# Patient Record
Sex: FEMALE | Race: WHITE | Employment: OTHER | RURAL
[De-identification: names, ages, dates, MRNs, and addresses within clinical notes are randomized per-mention and may not be internally consistent; named-entity substitution may affect disease eponyms.]

---

## 2017-01-17 DIAGNOSIS — F41.8 DEPRESSION WITH ANXIETY: ICD-10-CM

## 2017-01-17 DIAGNOSIS — Z76.0 ENCOUNTER FOR MEDICATION REFILL: ICD-10-CM

## 2017-01-18 RX ORDER — LORAZEPAM 1 MG/1
1 TABLET ORAL
Qty: 60 TAB | Refills: 0 | Status: SHIPPED | OUTPATIENT
Start: 2017-01-18 | End: 2017-04-11 | Stop reason: SDUPTHER

## 2017-02-24 DIAGNOSIS — Z76.0 ENCOUNTER FOR MEDICATION REFILL: ICD-10-CM

## 2017-02-24 DIAGNOSIS — F41.8 DEPRESSION WITH ANXIETY: ICD-10-CM

## 2017-02-24 RX ORDER — LORAZEPAM 1 MG/1
1 TABLET ORAL
Qty: 60 TAB | Refills: 0 | Status: SHIPPED | OUTPATIENT
Start: 2017-02-24 | End: 2017-05-30 | Stop reason: SDUPTHER

## 2017-03-28 DIAGNOSIS — I25.83 CORONARY ARTERY DISEASE DUE TO LIPID RICH PLAQUE: ICD-10-CM

## 2017-03-28 DIAGNOSIS — I25.10 CORONARY ARTERY DISEASE DUE TO LIPID RICH PLAQUE: ICD-10-CM

## 2017-03-30 RX ORDER — CLOPIDOGREL BISULFATE 75 MG/1
TABLET ORAL
Qty: 90 TAB | Refills: 0 | Status: SHIPPED | OUTPATIENT
Start: 2017-03-30 | End: 2017-04-18 | Stop reason: SDUPTHER

## 2017-04-11 DIAGNOSIS — Z76.0 ENCOUNTER FOR MEDICATION REFILL: ICD-10-CM

## 2017-04-11 DIAGNOSIS — F41.8 DEPRESSION WITH ANXIETY: ICD-10-CM

## 2017-04-13 RX ORDER — LORAZEPAM 1 MG/1
1 TABLET ORAL
Qty: 60 TAB | Refills: 0 | Status: SHIPPED | OUTPATIENT
Start: 2017-04-13 | End: 2017-08-22 | Stop reason: SDUPTHER

## 2017-04-18 ENCOUNTER — OFFICE VISIT (OUTPATIENT)
Dept: FAMILY MEDICINE CLINIC | Age: 67
End: 2017-04-18

## 2017-04-18 VITALS
OXYGEN SATURATION: 96 % | WEIGHT: 167 LBS | HEIGHT: 62 IN | DIASTOLIC BLOOD PRESSURE: 76 MMHG | BODY MASS INDEX: 30.73 KG/M2 | SYSTOLIC BLOOD PRESSURE: 162 MMHG | HEART RATE: 64 BPM | RESPIRATION RATE: 20 BRPM | TEMPERATURE: 98.1 F

## 2017-04-18 DIAGNOSIS — F41.8 DEPRESSION WITH ANXIETY: ICD-10-CM

## 2017-04-18 DIAGNOSIS — E78.5 HYPERLIPIDEMIA, UNSPECIFIED HYPERLIPIDEMIA TYPE: ICD-10-CM

## 2017-04-18 DIAGNOSIS — K21.9 GASTROESOPHAGEAL REFLUX DISEASE WITHOUT ESOPHAGITIS: ICD-10-CM

## 2017-04-18 DIAGNOSIS — M25.561 CHRONIC PAIN OF RIGHT KNEE: ICD-10-CM

## 2017-04-18 DIAGNOSIS — H54.7 BLINDNESS: ICD-10-CM

## 2017-04-18 DIAGNOSIS — I10 ESSENTIAL HYPERTENSION: Primary | ICD-10-CM

## 2017-04-18 DIAGNOSIS — E53.8 B12 DEFICIENCY: ICD-10-CM

## 2017-04-18 DIAGNOSIS — I25.10 CORONARY ARTERY DISEASE DUE TO LIPID RICH PLAQUE: ICD-10-CM

## 2017-04-18 DIAGNOSIS — Z76.0 ENCOUNTER FOR MEDICATION REFILL: ICD-10-CM

## 2017-04-18 DIAGNOSIS — I25.83 CORONARY ARTERY DISEASE DUE TO LIPID RICH PLAQUE: ICD-10-CM

## 2017-04-18 DIAGNOSIS — G89.29 CHRONIC PAIN OF RIGHT KNEE: ICD-10-CM

## 2017-04-18 RX ORDER — TRAZODONE HYDROCHLORIDE 50 MG/1
50 TABLET ORAL
Qty: 30 TAB | Refills: 5 | Status: SHIPPED | OUTPATIENT
Start: 2017-04-18 | End: 2017-10-20 | Stop reason: SDUPTHER

## 2017-04-18 RX ORDER — CLOPIDOGREL BISULFATE 75 MG/1
75 TABLET ORAL DAILY
Qty: 90 TAB | Refills: 1 | Status: SHIPPED | OUTPATIENT
Start: 2017-04-18 | End: 2017-09-29 | Stop reason: SDUPTHER

## 2017-04-18 RX ORDER — POTASSIUM CHLORIDE 750 MG/1
CAPSULE, EXTENDED RELEASE ORAL
Qty: 180 CAP | Refills: 1 | Status: SHIPPED | OUTPATIENT
Start: 2017-04-18 | End: 2018-06-08 | Stop reason: SDUPTHER

## 2017-04-18 RX ORDER — CETIRIZINE HCL 10 MG
10 TABLET ORAL DAILY
Qty: 30 TAB | Refills: 11 | Status: SHIPPED | OUTPATIENT
Start: 2017-04-18 | End: 2019-08-13

## 2017-04-18 RX ORDER — RANITIDINE 150 MG/1
150 TABLET, FILM COATED ORAL
Qty: 90 TAB | Refills: 2 | Status: SHIPPED | OUTPATIENT
Start: 2017-04-18 | End: 2017-09-29 | Stop reason: SDUPTHER

## 2017-04-18 RX ORDER — FUROSEMIDE 20 MG/1
20 TABLET ORAL DAILY
Qty: 90 TAB | Refills: 1 | Status: SHIPPED | OUTPATIENT
Start: 2017-04-18 | End: 2018-08-24 | Stop reason: SDUPTHER

## 2017-04-18 RX ORDER — LORAZEPAM 1 MG/1
1 TABLET ORAL
Qty: 60 TAB | Refills: 0 | Status: CANCELLED | OUTPATIENT
Start: 2017-04-18

## 2017-04-18 RX ORDER — BUPROPION HYDROCHLORIDE 150 MG/1
150 TABLET ORAL
Qty: 90 TAB | Refills: 1 | Status: SHIPPED | OUTPATIENT
Start: 2017-04-18 | End: 2018-03-19 | Stop reason: SDUPTHER

## 2017-04-18 RX ORDER — FOLIC ACID 1 MG/1
1 TABLET ORAL DAILY
Qty: 90 TAB | Refills: 1 | Status: SHIPPED | OUTPATIENT
Start: 2017-04-18 | End: 2017-07-17 | Stop reason: SDUPTHER

## 2017-04-18 RX ORDER — ATENOLOL 50 MG/1
TABLET ORAL
Qty: 90 TAB | Refills: 1 | Status: SHIPPED | OUTPATIENT
Start: 2017-04-18 | End: 2017-11-09 | Stop reason: SDUPTHER

## 2017-04-18 RX ORDER — SIMVASTATIN 40 MG/1
TABLET, FILM COATED ORAL
Qty: 90 TAB | Refills: 1 | Status: SHIPPED | OUTPATIENT
Start: 2017-04-18 | End: 2017-09-11 | Stop reason: SDUPTHER

## 2017-04-18 RX ORDER — VENLAFAXINE HYDROCHLORIDE 37.5 MG/1
37.5 CAPSULE, EXTENDED RELEASE ORAL DAILY
Qty: 90 CAP | Refills: 1 | Status: SHIPPED | OUTPATIENT
Start: 2017-04-18 | End: 2018-08-30 | Stop reason: SDUPTHER

## 2017-04-18 RX ORDER — CALCITRIOL 0.25 UG/1
0.25 CAPSULE ORAL DAILY
Qty: 90 CAP | Refills: 1 | Status: SHIPPED | OUTPATIENT
Start: 2017-04-18 | End: 2020-06-09

## 2017-04-18 NOTE — PROGRESS NOTES
Reviewed record in preparation for visit and have necessary documentation  Pt did not bring medication to office visit for review  Opportunity was given for questions  Goals that were addressed and/or need to be completed after this appointment include   Health Maintenance Due   Topic Date Due    DTaP/Tdap/Td series (1 - Tdap) 04/22/1973    PAP AKA CERVICAL CYTOLOGY  04/22/1973    ZOSTER VACCINE AGE 60>  04/22/2012    FOBT Q 1 YEAR AGE 50-75  02/25/2016    INFLUENZA AGE 9 TO ADULT  08/01/2016

## 2017-04-18 NOTE — MR AVS SNAPSHOT
Visit Information Date & Time Provider Department Dept. Phone Encounter #  
 4/18/2017  2:20 PM Emma Kelly MD [address] [place] [address] [id number] Follow-up Instructions Return in about 6 months (around 10/18/2017), or if symptoms worsen or fail to improve. Upcoming Health Maintenance Date Due DTaP/Tdap/Td series (1 - Tdap) 4/22/1973 PAP AKA CERVICAL CYTOLOGY 4/22/1973 ZOSTER VACCINE AGE 60> 4/22/2012 FOBT Q 1 YEAR AGE 50-75 2/25/2016 INFLUENZA AGE 9 TO ADULT 8/1/2016 BREAST CANCER SCRN MAMMOGRAM 5/20/2018 Allergies as of 4/18/2017  Review Complete On: 4/18/2017 By: Emma Kelly MD  
  
 Severity Noted Reaction Type Reactions Percocet [Oxycodone-acetaminophen]  03/29/2012    Itching Current Immunizations  Reviewed on 10/25/2012 Name Date Influenza Vaccine 10/16/2013 Influenza Vaccine Split 10/4/2012 Pneumococcal Polysaccharide (PPSV-23) 10/18/2016 Not reviewed this visit You Were Diagnosed With   
  
 Codes Comments Essential hypertension    -  Primary ICD-10-CM: I10 
ICD-9-CM: 401.9 Hyperlipidemia, unspecified hyperlipidemia type     ICD-10-CM: E78.5 ICD-9-CM: 272.4 Coronary artery disease due to lipid rich plaque     ICD-10-CM: I25.10, I25.83 ICD-9-CM: 414.00, 414.3 Gastroesophageal reflux disease without esophagitis     ICD-10-CM: K21.9 ICD-9-CM: 530.81   
 B12 deficiency     ICD-10-CM: E53.8 ICD-9-CM: 266.2 Depression with anxiety     ICD-10-CM: F41.8 ICD-9-CM: 300.4 Chronic pain of right knee     ICD-10-CM: M25.561, G89.29 ICD-9-CM: 719.46, 338.29 Encounter for medication refill     ICD-10-CM: Z76.0 ICD-9-CM: V68.1 Blindness     ICD-10-CM: H54.0 ICD-9-CM: 369.00 Vitals BP Pulse Temp Resp Height(growth percentile) Weight(growth percentile) 162/76 64 98.1 °F (36.7 °C) (Oral) 20 5' 2\" (1.575 m) 167 lb (75.8 kg) SpO2 BMI OB Status Smoking Status 96% 30.54 kg/m2 Postmenopausal Never Smoker Vitals History BMI and BSA Data Body Mass Index Body Surface Area 30.54 kg/m 2 1.82 m 2 Preferred Pharmacy Pharmacy Name Phone [address] [facility]  [phone] Your Updated Medication List  
  
   
This list is accurate as of: 4/18/17  3:44 PM.  Always use your most recent med list.  
  
  
  
  
 * albuterol 90 mcg/actuation inhaler Commonly known as:  VENTOLIN HFA Take 1 Puff by inhalation every six (6) hours as needed for Wheezing. * albuterol 90 mcg/actuation inhaler Commonly known as:  PROVENTIL HFA, VENTOLIN HFA, PROAIR HFA Take 2 Puffs by inhalation every four (4) hours as needed for Wheezing. aspirin 81 mg tablet Take 81 mg by mouth. atenolol 50 mg tablet Commonly known as:  TENORMIN  
TAKE ONE TABLET BY MOUTH DAILY  
  
 buPROPion  mg tablet Commonly known as:  Charu Drum Take 1 Tab by mouth every morning. calcitRIOL 0.25 mcg capsule Commonly known as:  ROCALTROL Take 1 Cap by mouth daily. cetirizine 10 mg tablet Commonly known as:  ZYRTEC Take 1 Tab by mouth daily. clopidogrel 75 mg Tab Commonly known as:  PLAVIX Take 1 Tab by mouth daily. * cyanocobalamin 500 mcg/spray Spry  
1,000 mcg by Nasal route every seven (7) days. * cyanocobalamin 1,000 mcg/mL injection Commonly known as:  VITAMIN B12  
1 mL by IntraMUSCular route every seven (7) days. folic acid 1 mg tablet Commonly known as:  [employer] Take 1 Tab by mouth daily. furosemide 20 mg tablet Commonly known as:  LASIX Take 1 Tab by mouth daily. * LORazepam 1 mg tablet Commonly known as:  ATIVAN Take 1 Tab by mouth every eight (8) hours as needed for Anxiety. * LORazepam 1 mg tablet Commonly known as:  ATIVAN  
 Take 1 Tab by mouth every eight (8) hours as needed for Anxiety. nitroglycerin 0.4 mg SL tablet Commonly known as:  NITROSTAT  
1 Tab by SubLINGual route as needed for Chest Pain.  
  
 potassium chloride SA 10 mEq capsule Commonly known as:  MICRO-K  
TAKE ONE CAPSULE BY MOUTH TWICE A DAY  
  
 raNITIdine 150 mg tablet Commonly known as:  ZANTAC Take 1 Tab by mouth nightly. simvastatin 40 mg tablet Commonly known as:  ZOCOR  
TAKE ONE TABLET BY MOUTH NIGHTLY  
  
 traZODone 50 mg tablet Commonly known as:  [place] Take 1 Tab by mouth nightly. Indications: insomnia associated with depression  
  
 triamcinolone acetonide 0.1 % ointment Commonly known as:  KENALOG Apply  to affected area two (2) times a day. use thin layer  
  
 venlafaxine-SR 37.5 mg capsule Commonly known as:  EFFEXOR-XR Take 1 Cap by mouth daily. * Notice: This list has 6 medication(s) that are the same as other medications prescribed for you. Read the directions carefully, and ask your doctor or other care provider to review them with you. Prescriptions Sent to Pharmacy Refills  
 clopidogrel (PLAVIX) 75 mg tab 1 Sig: Take 1 Tab by mouth daily. Class: Normal  
 Pharmacy: [address] [place] [address] Ph #: [phone] Route: Oral  
 simvastatin (ZOCOR) 40 mg tablet 1 Sig: TAKE ONE TABLET BY MOUTH NIGHTLY Class: Normal  
 Pharmacy: [address] [place] [address] Ph #: [phone]  
 atenolol (TENORMIN) 50 mg tablet 1 Sig: TAKE ONE TABLET BY MOUTH DAILY Class: Normal  
 Pharmacy: [address] [place] [address] Ph #: [phone]  
 buPROPion XL (WELLBUTRIN XL) 150 mg tablet 1 Sig: Take 1 Tab by mouth every morning. Class: Normal  
 Pharmacy: [address] [place] [address] Ph #: [phone]  Route: Oral  
 raNITIdine (ZANTAC) 150 mg tablet 2  
 Sig: Take 1 Tab by mouth nightly. Class: Normal  
 Pharmacy: [address] [place] [address] #: [phone] Route: Oral  
 cetirizine (ZYRTEC) 10 mg tablet 11 Sig: Take 1 Tab by mouth daily. Class: Normal  
 Pharmacy: [address] [place] [address] #: [phone] Route: Oral  
 venlafaxine-SR (EFFEXOR-XR) 37.5 mg capsule 1 Sig: Take 1 Cap by mouth daily. Class: Normal  
 Pharmacy: [address] [place] [address] #: [phone] Route: Oral  
 furosemide (LASIX) 20 mg tablet 1 Sig: Take 1 Tab by mouth daily. Class: Normal  
 Pharmacy: [address] [place] [address] #: [phone] Route: Oral  
 potassium chloride SA (MICRO-K) 10 mEq capsule 1 Sig: TAKE ONE CAPSULE BY MOUTH TWICE A DAY Class: Normal  
 Pharmacy: [address] [place] [address] #: [phone]  
 calcitRIOL (ROCALTROL) 0.25 mcg capsule 1 Sig: Take 1 Cap by mouth daily. Class: Normal  
 Pharmacy: [address] [place] [address] #: [phone] Route: Oral  
 folic acid (FOLVITE) 1 mg tablet 1 Sig: Take 1 Tab by mouth daily. Class: Normal  
 Pharmacy: [address] [place] [address] #: [phone] Route: Oral  
 traZODone (DESYREL) 50 mg tablet 5 Sig: Take 1 Tab by mouth nightly. Indications: insomnia associated with depression Class: Normal  
 Pharmacy: [address] [place] [address] #: [phone] Route: Oral  
  
We Performed the Following CBC W/O DIFF [51869 CPT(R)] LIPID PANEL [12184 CPT(R)] METABOLIC PANEL, COMPREHENSIVE [51025 CPT(R)] REFERRAL TO ORTHOPEDICS [[id number] Custom] Comments:  
 Please evaluate patient for chronic right knee pain and edema. Chantel. Call patient. TSH 3RD GENERATION [97140 CPT(R)] VITAMIN B12 [id number] CPT(R)] Follow-up Instructions Return in about 6 months (around 10/18/2017), or if symptoms worsen or fail to improve. To-Do List   
 04/18/2017 Imaging:  XR KNEE RT MAX 2 VWS Referral Information Referral ID Referred By Referred To  
  
 [id number] Net[facility]EL[facility] Not Available Visits Status Start Date End Date 1 New Request 4/18/17 4/18/18 If your referral has a status of pending review or denied, additional information will be sent to support the outcome of this decision. Patient Instructions Advance Directives: Care Instructions Your Care Instructions An advance directive is a legal way to state your wishes at the end of your life. It tells your family and your doctor what to do if you can no longer say what you want. There are two main types of advance directives. You can change them any time that your wishes change. · A living will tells your family and your doctor your wishes about life support and other treatment. · A durable power of  for health care lets you name a person to make treatment decisions for you when you can't speak for yourself. This person is called a health care agent. If you do not have an advance directive, decisions about your medical care may be made by a doctor or a  who doesn't know you. It may help to think of an advance directive as a gift to the people who care for you. If you have one, they won't have to make tough decisions by themselves. Follow-up care is a key part of your treatment and safety. Be sure to make and go to all appointments, and call your doctor if you are having problems. It's also a good idea to know your test results and keep a list of the medicines you take. How can you care for yourself at home? · Discuss your wishes with your loved ones and your doctor. This way, there are no surprises. · Many states have a unique form.  Or you might use a universal form that has been approved by many states. This kind of form can sometimes be completed and stored online. Your electronic copy will then be available wherever you have a connection to the Internet. In most cases, doctors will respect your wishes even if you have a form from a different state. · You don't need a  to do an advance directive. But you may want to get legal advice. · Think about these questions when you prepare an advance directive: ¨ Who do you want to make decisions about your medical care if you are not able to? Many people choose a family member or close friend. ¨ Do you know enough about life support methods that might be used? If not, talk to your doctor so you understand. ¨ What are you most afraid of that might happen? You might be afraid of having pain, losing your independence, or being kept alive by machines. ¨ Where would you prefer to die? Choices include your home, a hospital, or a nursing home. ¨ Would you like to have information about hospice care to support you and your family? ¨ Do you want to donate organs when you die? ¨ Do you want certain Voodoo practices performed before you die? If so, put your wishes in the advance directive. · Read your advance directive every year, and make changes as needed. When should you call for help? Be sure to contact your doctor if you have any questions. Where can you learn more? Go to http://whitney-gage.info/. Enter R264 in the search box to learn more about \"Advance Directives: Care Instructions. \" Current as of: November 17, 2016 Content Version: 11.2 © 1816-1038 [employer]. Care instructions adapted under license by [employer] (which disclaims liability or warranty for this information).  If you have questions about a medical condition or this instruction, always ask your healthcare professional. [address] any warranty or liability for your use of this information. Please provide this summary of care documentation to your next provider. Your primary care clinician is listed as Esthela Sierra. If you have any questions after today's visit, please call [phone].

## 2017-04-18 NOTE — PATIENT INSTRUCTIONS
Advance Directives: Care Instructions  Your Care Instructions  An advance directive is a legal way to state your wishes at the end of your life. It tells your family and your doctor what to do if you can no longer say what you want. There are two main types of advance directives. You can change them any time that your wishes change. · A living will tells your family and your doctor your wishes about life support and other treatment. · A durable power of  for health care lets you name a person to make treatment decisions for you when you can't speak for yourself. This person is called a health care agent. If you do not have an advance directive, decisions about your medical care may be made by a doctor or a  who doesn't know you. It may help to think of an advance directive as a gift to the people who care for you. If you have one, they won't have to make tough decisions by themselves. Follow-up care is a key part of your treatment and safety. Be sure to make and go to all appointments, and call your doctor if you are having problems. It's also a good idea to know your test results and keep a list of the medicines you take. How can you care for yourself at home? · Discuss your wishes with your loved ones and your doctor. This way, there are no surprises. · Many states have a unique form. Or you might use a universal form that has been approved by many states. This kind of form can sometimes be completed and stored online. Your electronic copy will then be available wherever you have a connection to the Internet. In most cases, doctors will respect your wishes even if you have a form from a different state. · You don't need a  to do an advance directive. But you may want to get legal advice. · Think about these questions when you prepare an advance directive:  ¨ Who do you want to make decisions about your medical care if you are not able to?  Many people choose a family member or close friend. ¨ Do you know enough about life support methods that might be used? If not, talk to your doctor so you understand. ¨ What are you most afraid of that might happen? You might be afraid of having pain, losing your independence, or being kept alive by machines. ¨ Where would you prefer to die? Choices include your home, a hospital, or a nursing home. ¨ Would you like to have information about hospice care to support you and your family? ¨ Do you want to donate organs when you die? ¨ Do you want certain Bahai practices performed before you die? If so, put your wishes in the advance directive. · Read your advance directive every year, and make changes as needed. When should you call for help? Be sure to contact your doctor if you have any questions. Where can you learn more? Go to http://whitney-gage.info/. Enter R264 in the search box to learn more about \"Advance Directives: Care Instructions. \"  Current as of: November 17, 2016  Content Version: 11.2  © 6753-9767 [employer]wise, Incorporated. Care instructions adapted under license by [employer] (which disclaims liability or warranty for this information). If you have questions about a medical condition or this instruction, always ask your healthcare professional. [address] any warranty or liability for your use of this information.

## 2017-04-19 LAB
ALBUMIN SERPL-MCNC: 4.1 G/DL (ref 3.6–4.8)
ALBUMIN/GLOB SERPL: 1.6 {RATIO} (ref 1.2–2.2)
ALP SERPL-CCNC: 63 IU/L (ref 39–117)
ALT SERPL-CCNC: 12 IU/L (ref 0–32)
AST SERPL-CCNC: 15 IU/L (ref 0–40)
BILIRUB SERPL-MCNC: 0.3 MG/DL (ref 0–1.2)
BUN SERPL-MCNC: 11 MG/DL (ref 8–27)
BUN/CREAT SERPL: 14 (ref 12–28)
CALCIUM SERPL-MCNC: 9.5 MG/DL (ref 8.7–10.3)
CHLORIDE SERPL-SCNC: 109 MMOL/L (ref 96–106)
CHOLEST SERPL-MCNC: 162 MG/DL (ref 100–199)
CO2 SERPL-SCNC: 24 MMOL/L (ref 18–29)
CREAT SERPL-MCNC: 0.78 MG/DL (ref 0.57–1)
ERYTHROCYTE [DISTWIDTH] IN BLOOD BY AUTOMATED COUNT: 13.1 % (ref 12.3–15.4)
GLOBULIN SER CALC-MCNC: 2.6 G/DL (ref 1.5–4.5)
GLUCOSE SERPL-MCNC: 87 MG/DL (ref 65–99)
HCT VFR BLD AUTO: 37.7 % (ref 34–46.6)
HDLC SERPL-MCNC: 54 MG/DL
HGB BLD-MCNC: 12.7 G/DL (ref 11.1–15.9)
LDLC SERPL CALC-MCNC: 91 MG/DL (ref 0–99)
MCH RBC QN AUTO: 32.6 PG (ref 26.6–33)
MCHC RBC AUTO-ENTMCNC: 33.7 G/DL (ref 31.5–35.7)
MCV RBC AUTO: 97 FL (ref 79–97)
PLATELET # BLD AUTO: 169 X10E3/UL (ref 150–379)
POTASSIUM SERPL-SCNC: 4.4 MMOL/L (ref 3.5–5.2)
PROT SERPL-MCNC: 6.7 G/DL (ref 6–8.5)
RBC # BLD AUTO: 3.89 X10E6/UL (ref 3.77–5.28)
SODIUM SERPL-SCNC: 147 MMOL/L (ref 134–144)
TRIGL SERPL-MCNC: 85 MG/DL (ref 0–149)
TSH SERPL DL<=0.005 MIU/L-ACNC: 2.78 UIU/ML (ref 0.45–4.5)
VIT B12 SERPL-MCNC: 292 PG/ML (ref 211–946)
VLDLC SERPL CALC-MCNC: 17 MG/DL (ref 5–40)
WBC # BLD AUTO: 6.7 X10E3/UL (ref 3.4–10.8)

## 2017-04-25 NOTE — PROGRESS NOTES
Chief Complaint   Patient presents with    Knee Pain     swelling and pain     she is a 74y.o. year old female who presents for medication refills. Patient with multiple co-morbidities that include CAD, HTN, HLD, B12 deficiency, blindness, anxiety and depression. Patient denies HA, dizziness, SOB, CP, abdominal pain, dysuria. She complains of right knee edema and pain. Hypertension:  The patient reports: Has run out of medication, no medication side effects noted, no TIA's, no chest pain on exertion, no dyspnea on exertion, no swelling of ankles. Lifestyle modification/social history: sedentary     BP Readings from Last 3 Encounters:   04/18/17 162/76   10/12/16 119/55   09/09/16 159/77     Lab Results   Component Value Date/Time    Sodium 147 04/18/2017 04:01 PM    Potassium 4.4 04/18/2017 04:01 PM    Chloride 109 04/18/2017 04:01 PM    CO2 24 04/18/2017 04:01 PM    Glucose 87 04/18/2017 04:01 PM    BUN 11 04/18/2017 04:01 PM    Creatinine 0.78 04/18/2017 04:01 PM    BUN/Creatinine ratio 14 04/18/2017 04:01 PM    GFR est AA 93 04/18/2017 04:01 PM    GFR est non-AA 81 04/18/2017 04:01 PM    Calcium 9.5 04/18/2017 04:01 PM    Bilirubin, total 0.3 04/18/2017 04:01 PM    ALT (SGPT) 12 04/18/2017 04:01 PM    AST (SGOT) 15 04/18/2017 04:01 PM    Alk. phosphatase 63 04/18/2017 04:01 PM    Protein, total 6.7 04/18/2017 04:01 PM    Albumin 4.1 04/18/2017 04:01 PM    A-G Ratio 1.6 04/18/2017 04:01 PM        Patient advised to log blood pressures at home 3-5 times monthly and bring to next visit. Call office as soon as possible if BP's over 140/90 on multiple occasions or with symptoms of dizziness, chest pain, shortness of breath, headache or ankle swelling. Our goal is to normalize the blood pressure to decrease the risks of strokes and heart attacks. The patient is in agreement with the plan.     Hyperlipidemia    Cardiovascular risks for her are: HTN, CAD    Lab Results   Component Value Date/Time Cholesterol, total 162 04/18/2017 04:01 PM    HDL Cholesterol 54 04/18/2017 04:01 PM    LDL, calculated 91 04/18/2017 04:01 PM    Triglyceride 85 04/18/2017 04:01 PM     Lab Results   Component Value Date/Time    ALT (SGPT) 12 04/18/2017 04:01 PM    AST (SGOT) 15 04/18/2017 04:01 PM    Alk. phosphatase 63 04/18/2017 04:01 PM    Bilirubin, total 0.3 04/18/2017 04:01 PM     Our goal is to lower hyperlipidemia to decrease the risks of strokes and heart attacks      Patient Active Problem List   Diagnosis Code    Anxiety F41.9    Hyperlipidemia E78.5    CAD (coronary artery disease) I25.10    Blindness H54.0    HTN (hypertension) I10    Osteopenia M85.80    B12 deficiency E53.8    Insomnia G47.00    Allergic rhinitis J30.9    Gastroesophageal reflux disease without esophagitis K21.9     Past Surgical History:   Procedure Laterality Date    CABG, VEIN, FIVE  2006    CARDIAC SURG PROCEDURE UNLIST      HX ORTHOPAEDIC       Social History     Social History    Marital status:      Spouse name: N/A    Number of children: N/A    Years of education: N/A     Occupational History    Not on file. Social History Main Topics    Smoking status: Never Smoker    Smokeless tobacco: Never Used    Alcohol use Yes      Comment: social    Drug use: No    Sexual activity: Yes     Partners: Male     Birth control/ protection: None     Other Topics Concern    Not on file     Social History Narrative     Family History   Problem Relation Age of Onset    No Known Problems Mother     No Known Problems Father      Current Outpatient Prescriptions   Medication Sig    clopidogrel (PLAVIX) 75 mg tab Take 1 Tab by mouth daily.  simvastatin (ZOCOR) 40 mg tablet TAKE ONE TABLET BY MOUTH NIGHTLY    atenolol (TENORMIN) 50 mg tablet TAKE ONE TABLET BY MOUTH DAILY    buPROPion XL (WELLBUTRIN XL) 150 mg tablet Take 1 Tab by mouth every morning.  raNITIdine (ZANTAC) 150 mg tablet Take 1 Tab by mouth nightly.     cetirizine (ZYRTEC) 10 mg tablet Take 1 Tab by mouth daily.  venlafaxine-SR (EFFEXOR-XR) 37.5 mg capsule Take 1 Cap by mouth daily.  furosemide (LASIX) 20 mg tablet Take 1 Tab by mouth daily.  potassium chloride SA (MICRO-K) 10 mEq capsule TAKE ONE CAPSULE BY MOUTH TWICE A DAY    calcitRIOL (ROCALTROL) 0.25 mcg capsule Take 1 Cap by mouth daily.  folic acid (FOLVITE) 1 mg tablet Take 1 Tab by mouth daily.  traZODone (DESYREL) 50 mg tablet Take 1 Tab by mouth nightly. Indications: insomnia associated with depression    LORazepam (ATIVAN) 1 mg tablet Take 1 Tab by mouth every eight (8) hours as needed for Anxiety.  LORazepam (ATIVAN) 1 mg tablet Take 1 Tab by mouth every eight (8) hours as needed for Anxiety.  albuterol (PROVENTIL HFA, VENTOLIN HFA, PROAIR HFA) 90 mcg/actuation inhaler Take 2 Puffs by inhalation every four (4) hours as needed for Wheezing.  albuterol (VENTOLIN HFA) 90 mcg/actuation inhaler Take 1 Puff by inhalation every six (6) hours as needed for Wheezing.  nitroglycerin (NITROSTAT) 0.4 mg SL tablet 1 Tab by SubLINGual route as needed for Chest Pain.  aspirin 81 mg tablet Take 81 mg by mouth.  cyanocobalamin (VITAMIN B12) 1,000 mcg/mL injection 1 mL by IntraMUSCular route every seven (7) days.  triamcinolone acetonide (KENALOG) 0.1 % ointment Apply  to affected area two (2) times a day. use thin layer    cyanocobalamin 500 mcg spry 1,000 mcg by Nasal route every seven (7) days. No current facility-administered medications for this visit.       Allergies   Allergen Reactions    Percocet [Oxycodone-Acetaminophen] Itching       Review of Systems:  Constitutional: feeling well, denies fatigue, malaise  Skin: Negative for rash or lesion  HEENT: see HPI, Negative for acute hearing changes  Respiratory: Negative for cough, wheezing or SOB  Cardiovascular: Negative for chest pain, dizziness or palpitations  Gastrointestinal: Negative for nausea or abdominal pain  Genital/urinary: Negative for dysuria or voiding dysfunction  Musculoskeletal: c/o right knee arthralgia   Neurological: Negative for HA, weakness or paresthesia  Psychlogical: Negative for depression or anxiety     Vitals:    04/18/17 1425 04/18/17 1431   BP: 177/90 162/76   Pulse: 64    Resp: 20    Temp: 98.1 °F (36.7 °C)    TempSrc: Oral    SpO2: 96%    Weight: 167 lb (75.8 kg)    Height: 5' 2\" (1.575 m)        Physical Examination:  General: Well developed, well nourished, in no acute distress  Skin: Warm and dry, no rash or lesion appreciated  Head: Normocephalic, atraumatic  Eyes: Sclera clear, EOMI  Neck: Normal range of motion  Respiratory: Clear to auscultation bilaterally with symmetrical effort  Cardiovascular: Normal S1, S2, Regular rate and rhythm  Abdomen: Soft, Normal BS, non-distended  Extremities: Full range of motion, Normal gait  Neurological: Normal strength and sensation. No focal deficits  Psychological: Active, alert and oriented. Affect appropriate     Xray: Minimal DJD patella     Chyna was seen today for knee pain. Diagnoses and all orders for this visit:    Essential hypertension  -     atenolol (TENORMIN) 50 mg tablet; TAKE ONE TABLET BY MOUTH DAILY  -     furosemide (LASIX) 20 mg tablet; Take 1 Tab by mouth daily.  -     METABOLIC PANEL, COMPREHENSIVE  -     TSH 3RD GENERATION  -     CBC W/O DIFF    Hyperlipidemia, unspecified hyperlipidemia type  -     simvastatin (ZOCOR) 40 mg tablet; TAKE ONE TABLET BY MOUTH NIGHTLY  -     METABOLIC PANEL, COMPREHENSIVE  -     LIPID PANEL    Coronary artery disease due to lipid rich plaque  -     clopidogrel (PLAVIX) 75 mg tab; Take 1 Tab by mouth daily. -     simvastatin (ZOCOR) 40 mg tablet; TAKE ONE TABLET BY MOUTH NIGHTLY  -     METABOLIC PANEL, COMPREHENSIVE  -     LIPID PANEL    Gastroesophageal reflux disease without esophagitis  -     raNITIdine (ZANTAC) 150 mg tablet; Take 1 Tab by mouth nightly.     B12 deficiency  -     VITAMIN B12    Depression with anxiety  -     buPROPion XL (WELLBUTRIN XL) 150 mg tablet; Take 1 Tab by mouth every morning.  -     venlafaxine-SR (EFFEXOR-XR) 37.5 mg capsule; Take 1 Cap by mouth daily. -     traZODone (DESYREL) 50 mg tablet; Take 1 Tab by mouth nightly. Indications: insomnia associated with depression    Chronic pain of right knee  -     XR KNEE RT MAX 2 VWS; Future  -     REFERRAL TO ORTHOPEDICS    Encounter for medication refill  -     cetirizine (ZYRTEC) 10 mg tablet; Take 1 Tab by mouth daily. -     potassium chloride SA (MICRO-K) 10 mEq capsule; TAKE ONE CAPSULE BY MOUTH TWICE A DAY  -     calcitRIOL (ROCALTROL) 0.25 mcg capsule; Take 1 Cap by mouth daily. -     folic acid (FOLVITE) 1 mg tablet; Take 1 Tab by mouth daily. Blindness    Other orders  -     Cancel: LORazepam (ATIVAN) 1 mg tablet; Take 1 Tab by mouth every eight (8) hours as needed for Anxiety. Importance of compliance with all prescribed medications discussed. Continue current prescribed medications as written. I have discussed the diagnosis with the patient and the intended plan as seen in the above orders. The patient expresses understanding and agreement with our plan of care. All of the patient's questions were answered to apparent satisfaction. The patient has received an after-visit summary. The patient knows to call our office if there are any questions or concerns regarding diagnosis and treatment plans. I have discussed medication side effects and warnings with the patient as well. Over half of the 40 minutes face to face with Veto Dotson consisted of counseling and discussing treatment plans in reference to her multiple co-morbidities. Follow-up Disposition:  Return in about 6 months (around 10/18/2017), or if symptoms worsen or fail to improve.

## 2017-05-11 ENCOUNTER — TELEPHONE (OUTPATIENT)
Dept: FAMILY MEDICINE CLINIC | Age: 67
End: 2017-05-11

## 2017-05-11 RX ORDER — FLUTICASONE PROPIONATE 50 MCG
2 SPRAY, SUSPENSION (ML) NASAL DAILY
Qty: 1 BOTTLE | Refills: 2 | Status: SHIPPED | OUTPATIENT
Start: 2017-05-11 | End: 2018-01-31 | Stop reason: SDUPTHER

## 2017-05-11 NOTE — TELEPHONE ENCOUNTER
Pt mentioned to Dr. Precilla Hodgkins about her congestion and coughing with phlem. Didn't remember that she needed prednisone or something for it until she got to the [facility]. Thanks Please confirm with Pt.

## 2017-05-30 DIAGNOSIS — F41.8 DEPRESSION WITH ANXIETY: ICD-10-CM

## 2017-05-30 DIAGNOSIS — Z76.0 ENCOUNTER FOR MEDICATION REFILL: ICD-10-CM

## 2017-06-01 RX ORDER — LORAZEPAM 1 MG/1
1 TABLET ORAL
Qty: 60 TAB | Refills: 0 | Status: SHIPPED | OUTPATIENT
Start: 2017-06-01 | End: 2017-07-12 | Stop reason: SDUPTHER

## 2017-07-11 RX ORDER — IBUPROFEN 400 MG/1
TABLET ORAL
Qty: 60 TAB | Refills: 0 | Status: SHIPPED | OUTPATIENT
Start: 2017-07-11 | End: 2019-01-24

## 2017-07-12 DIAGNOSIS — F41.8 DEPRESSION WITH ANXIETY: ICD-10-CM

## 2017-07-12 DIAGNOSIS — Z76.0 ENCOUNTER FOR MEDICATION REFILL: ICD-10-CM

## 2017-07-14 RX ORDER — LORAZEPAM 1 MG/1
1 TABLET ORAL
Qty: 60 TAB | Refills: 0 | Status: SHIPPED | OUTPATIENT
Start: 2017-07-14 | End: 2017-11-09 | Stop reason: SDUPTHER

## 2017-07-14 NOTE — TELEPHONE ENCOUNTER
Called patient to advise RX for Lorazepam called to [facility] pharmacy. Patient expressed understanding.

## 2017-07-17 ENCOUNTER — OFFICE VISIT (OUTPATIENT)
Dept: FAMILY MEDICINE CLINIC | Age: 67
End: 2017-07-17

## 2017-07-17 VITALS
HEART RATE: 68 BPM | WEIGHT: 170 LBS | OXYGEN SATURATION: 96 % | TEMPERATURE: 98.3 F | SYSTOLIC BLOOD PRESSURE: 144 MMHG | BODY MASS INDEX: 31.28 KG/M2 | DIASTOLIC BLOOD PRESSURE: 74 MMHG | RESPIRATION RATE: 18 BRPM | HEIGHT: 62 IN

## 2017-07-17 DIAGNOSIS — Z13.31 SCREENING FOR DEPRESSION: ICD-10-CM

## 2017-07-17 DIAGNOSIS — Z13.39 SCREENING FOR ALCOHOLISM: ICD-10-CM

## 2017-07-17 DIAGNOSIS — Z76.0 ENCOUNTER FOR MEDICATION REFILL: ICD-10-CM

## 2017-07-17 DIAGNOSIS — Z00.00 ROUTINE GENERAL MEDICAL EXAMINATION AT A HEALTH CARE FACILITY: Primary | ICD-10-CM

## 2017-07-17 RX ORDER — FOLIC ACID 1 MG/1
1 TABLET ORAL DAILY
Qty: 90 TAB | Refills: 1 | Status: SHIPPED | OUTPATIENT
Start: 2017-07-17 | End: 2020-06-09

## 2017-07-17 RX ORDER — LANOLIN ALCOHOL/MO/W.PET/CERES
1000 CREAM (GRAM) TOPICAL DAILY
Qty: 90 TAB | Refills: 1 | Status: SHIPPED | OUTPATIENT
Start: 2017-07-17 | End: 2018-03-08 | Stop reason: SDUPTHER

## 2017-07-17 NOTE — MR AVS SNAPSHOT
Visit Information Date & Time Provider Department Dept. Phone Encounter #  
 7/17/2017  1:00 PM Riya Thibodeaux, [address] [id number] Follow-up Instructions Return in about 6 months (around 1/17/2018), or if symptoms worsen or fail to improve. Your Appointments 10/17/2017 10:20 AM  
ROUTINE CARE with Riya Thibodeaux MD  
[facility] Appt Note: 6 month f/u  
 [address] [address]  
[place] [address] [address] Upcoming Health Maintenance Date Due DTaP/Tdap/Td series (1 - Tdap) 4/22/1973 ZOSTER VACCINE AGE 60> 4/22/2012 GLAUCOMA SCREENING Q2Y 4/22/2017 OSTEOPOROSIS SCREENING (DEXA) 4/22/2017 MEDICARE YEARLY EXAM 4/22/2017 FOBT Q 1 YEAR AGE 50-75 7/6/2028* INFLUENZA AGE 9 TO ADULT 8/1/2017 Pneumococcal 65+ Low/Medium Risk (1 of 2 - PCV13) 10/18/2017 BREAST CANCER SCRN MAMMOGRAM 5/20/2018 *Topic was postponed. The date shown is not the original due date. Allergies as of 7/17/2017  Review Complete On: 7/17/2017 By: Riya Thibodeaux MD  
  
 Severity Noted Reaction Type Reactions Percocet [Oxycodone-acetaminophen]  03/29/2012    Itching Current Immunizations  Reviewed on 10/25/2012 Name Date Influenza Vaccine 10/16/2013 Influenza Vaccine Split 10/4/2012 Pneumococcal Polysaccharide (PPSV-23) 10/18/2016 Not reviewed this visit You Were Diagnosed With   
  
 Codes Comments Routine general medical examination at a health care facility    -  Primary ICD-10-CM: Z00.00 ICD-9-CM: V70.0 Screening for alcoholism     ICD-10-CM: Z13.89 ICD-9-CM: V79.1 Screening for depression     ICD-10-CM: Z13.89 ICD-9-CM: V79.0 Encounter for medication refill     ICD-10-CM: Z76.0 ICD-9-CM: V68.1 Vitals BP Pulse Temp Resp Height(growth percentile) Weight(growth percentile) 159/78 68 98.3 °F (36.8 °C) (Oral) 18 5' 2\" (1.575 m) 170 lb (77.1 kg) SpO2 BMI OB Status Smoking Status 96% 31.09 kg/m2 Postmenopausal Never Smoker Vitals History BMI and BSA Data Body Mass Index Body Surface Area 31.09 kg/m 2 1.84 m 2 Preferred Pharmacy Pharmacy Name Phone [facility] [address] [phone] Your Updated Medication List  
  
   
This list is accurate as of: 7/17/17  1:49 PM.  Always use your most recent med list.  
  
  
  
  
 * albuterol 90 mcg/actuation inhaler Commonly known as:  VENTOLIN HFA Take 1 Puff by inhalation every six (6) hours as needed for Wheezing. * albuterol 90 mcg/actuation inhaler Commonly known as:  PROVENTIL HFA, VENTOLIN HFA, PROAIR HFA Take 2 Puffs by inhalation every four (4) hours as needed for Wheezing. aspirin 81 mg tablet Take 81 mg by mouth. atenolol 50 mg tablet Commonly known as:  TENORMIN  
TAKE ONE TABLET BY MOUTH DAILY  
  
 buPROPion  mg tablet Commonly known as:  Pella Chimera Take 1 Tab by mouth every morning. calcitRIOL 0.25 mcg capsule Commonly known as:  ROCALTROL Take 1 Cap by mouth daily. cetirizine 10 mg tablet Commonly known as:  ZYRTEC Take 1 Tab by mouth daily. clopidogrel 75 mg Tab Commonly known as:  PLAVIX Take 1 Tab by mouth daily. cyanocobalamin 1,000 mcg tablet Take 1 Tab by mouth daily. diph,pertuss(acel),tetanus vac(PF) 2 Lf-(2.5-5-3-5 mcg)-5Lf/0.5 mL Syrg vaccine Commonly known as:  ADACEL(TDAP ADOLESN/ADULT)(PF)  
0.5 mL by IntraMUSCular route once for 1 dose. fluticasone 50 mcg/actuation nasal spray Commonly known as:  Francella Lacks 2 Sprays by Both Nostrils route daily. folic acid 1 mg tablet Commonly known as:  Google Take 1 Tab by mouth daily. furosemide 20 mg tablet Commonly known as:  LASIX Take 1 Tab by mouth daily. ibuprofen 400 mg tablet Commonly known as:  MOTRIN  
TAKE ONE TABLET BY MOUTH EVERY 6 HOURS AS NEEDED FOR PAIN  
  
 * LORazepam 1 mg tablet Commonly known as:  ATIVAN Take 1 Tab by mouth every eight (8) hours as needed for Anxiety. * LORazepam 1 mg tablet Commonly known as:  ATIVAN Take 1 Tab by mouth every eight (8) hours as needed for Anxiety. nitroglycerin 0.4 mg SL tablet Commonly known as:  NITROSTAT  
1 Tab by SubLINGual route as needed for Chest Pain.  
  
 potassium chloride SA 10 mEq capsule Commonly known as:  MICRO-K  
TAKE ONE CAPSULE BY MOUTH TWICE A DAY  
  
 raNITIdine 150 mg tablet Commonly known as:  ZANTAC Take 1 Tab by mouth nightly. simvastatin 40 mg tablet Commonly known as:  ZOCOR  
TAKE ONE TABLET BY MOUTH NIGHTLY  
  
 traZODone 50 mg tablet Commonly known as:  Elvis Barge Take 1 Tab by mouth nightly. Indications: insomnia associated with depression  
  
 triamcinolone acetonide 0.1 % ointment Commonly known as:  KENALOG Apply  to affected area two (2) times a day. use thin layer  
  
 varicella zoster vacine live 19,400 unit/0.65 mL Susr injection Commonly known as:  varicella-zoster vacine live 1 Vial by SubCUTAneous route once for 1 dose. venlafaxine-SR 37.5 mg capsule Commonly known as:  EFFEXOR-XR Take 1 Cap by mouth daily. * Notice: This list has 4 medication(s) that are the same as other medications prescribed for you. Read the directions carefully, and ask your doctor or other care provider to review them with you. Prescriptions Printed Refills  
 varicella zoster vacine live (VARICELLA-ZOSTER VACINE LIVE) 19,400 unit/0.65 mL susr injection 0 Si Vial by SubCUTAneous route once for 1 dose. Class: Print Route: SubCUTAneous  diph,pertuss,acel,,tetanus vac,PF, (ADACEL,TDAP ADOLESN/ADULT,,PF,) 2 Lf-(2.5-5-3-5 mcg)-5Lf/0.5 mL syrg vaccine 0 Si.5 mL by IntraMUSCular route once for 1 dose. Class: Print Route: IntraMUSCular Prescriptions Sent to Pharmacy Refills  
 folic acid (FOLVITE) 1 mg tablet 1 Sig: Take 1 Tab by mouth daily. Class: Normal  
 Pharmacy: [address] [place] [address] #: [phone] Route: Oral  
 cyanocobalamin 1,000 mcg tablet 1 Sig: Take 1 Tab by mouth daily. Class: Normal  
 Pharmacy: [address] [place] [address] #: [phone] Route: Oral  
  
Follow-up Instructions Return in about 6 months (around 2018), or if symptoms worsen or fail to improve. Please provide this summary of care documentation to your next provider. Your primary care clinician is listed as Ivette Sanchez. If you have any questions after today's visit, please call [phone].

## 2017-07-17 NOTE — PROGRESS NOTES
Reviewed record in preparation for visit and have necessary documentation  Pt did not bring medication to office visit for review  Opportunity was given for questions  Goals that were addressed and/or need to be completed after this appointment include   Health Maintenance Due   Topic Date Due    DTaP/Tdap/Td series (1 - Tdap) 04/22/1973    ZOSTER VACCINE AGE 60>  04/22/2012    GLAUCOMA SCREENING Q2Y  04/22/2017    OSTEOPOROSIS SCREENING (DEXA)  04/22/2017    MEDICARE YEARLY EXAM  04/22/2017   Declines DEXA scan

## 2017-07-17 NOTE — PROGRESS NOTES
This is a Subsequent Medicare Annual Wellness Visit providing Personalized Prevention Plan Services (PPPS) (Performed 12 months after initial AWV and PPPS )    I have reviewed the patient's medical history in detail and updated the computerized patient record. History     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- [facility]- has GRIFFIN      Past Surgical History:   Procedure Laterality Date    CABG, VEIN, FIVE  2006    CARDIAC SURG PROCEDURE UNLIST      HX ORTHOPAEDIC       Current Outpatient Prescriptions   Medication Sig Dispense Refill    folic acid (FOLVITE) 1 mg tablet Take 1 Tab by mouth daily. 90 Tab 1    varicella zoster vacine live (VARICELLA-ZOSTER VACINE LIVE) 19,400 unit/0.65 mL susr injection 1 Vial by SubCUTAneous route once for 1 dose. 0.65 mL 0    cyanocobalamin 1,000 mcg tablet Take 1 Tab by mouth daily. 90 Tab 1    diph,pertuss,acel,,tetanus vac,PF, (ADACEL,TDAP ADOLESN/ADULT,,PF,) 2 Lf-(2.5-5-3-5 mcg)-5Lf/0.5 mL syrg vaccine 0.5 mL by IntraMUSCular route once for 1 dose. 0.5 mL 0    LORazepam (ATIVAN) 1 mg tablet Take 1 Tab by mouth every eight (8) hours as needed for Anxiety. 60 Tab 0    ibuprofen (MOTRIN) 400 mg tablet TAKE ONE TABLET BY MOUTH EVERY 6 HOURS AS NEEDED FOR PAIN 60 Tab 0    fluticasone (FLONASE) 50 mcg/actuation nasal spray 2 Sprays by Both Nostrils route daily. 1 Bottle 2    clopidogrel (PLAVIX) 75 mg tab Take 1 Tab by mouth daily. 90 Tab 1    simvastatin (ZOCOR) 40 mg tablet TAKE ONE TABLET BY MOUTH NIGHTLY 90 Tab 1    atenolol (TENORMIN) 50 mg tablet TAKE ONE TABLET BY MOUTH DAILY 90 Tab 1    buPROPion XL (WELLBUTRIN XL) 150 mg tablet Take 1 Tab by mouth every morning. 90 Tab 1    raNITIdine (ZANTAC) 150 mg tablet Take 1 Tab by mouth nightly.  90 Tab 2    cetirizine (ZYRTEC) 10 mg tablet Take 1 Tab by mouth daily. 30 Tab 11    venlafaxine-SR (EFFEXOR-XR) 37.5 mg capsule Take 1 Cap by mouth daily. 90 Cap 1    furosemide (LASIX) 20 mg tablet Take 1 Tab by mouth daily. 90 Tab 1    potassium chloride SA (MICRO-K) 10 mEq capsule TAKE ONE CAPSULE BY MOUTH TWICE A  Cap 1    calcitRIOL (ROCALTROL) 0.25 mcg capsule Take 1 Cap by mouth daily. 90 Cap 1    traZODone (DESYREL) 50 mg tablet Take 1 Tab by mouth nightly. Indications: insomnia associated with depression 30 Tab 5    LORazepam (ATIVAN) 1 mg tablet Take 1 Tab by mouth every eight (8) hours as needed for Anxiety. 60 Tab 0    albuterol (PROVENTIL HFA, VENTOLIN HFA, PROAIR HFA) 90 mcg/actuation inhaler Take 2 Puffs by inhalation every four (4) hours as needed for Wheezing. 1 Inhaler 5    albuterol (VENTOLIN HFA) 90 mcg/actuation inhaler Take 1 Puff by inhalation every six (6) hours as needed for Wheezing. 1 Inhaler 2    triamcinolone acetonide (KENALOG) 0.1 % ointment Apply  to affected area two (2) times a day. use thin layer 30 g 0    nitroglycerin (NITROSTAT) 0.4 mg SL tablet 1 Tab by SubLINGual route as needed for Chest Pain. 1 Bottle 3    aspirin 81 mg tablet Take 81 mg by mouth.          Allergies   Allergen Reactions    Percocet [Oxycodone-Acetaminophen] Itching     Family History   Problem Relation Age of Onset    No Known Problems Mother     No Known Problems Father      Social History   Substance Use Topics    Smoking status: Never Smoker    Smokeless tobacco: Never Used    Alcohol use Yes      Comment: social     Patient Active Problem List   Diagnosis Code    Anxiety F41.9    Hyperlipidemia E78.5    CAD (coronary artery disease) I25.10    Blindness H54.0    HTN (hypertension) I10    Osteopenia M85.80    B12 deficiency E53.8    Insomnia G47.00    Allergic rhinitis J30.9    Gastroesophageal reflux disease without esophagitis K21.9       Depression Risk Factor Screening:     PHQ over the last two weeks 10/12/2016   Little interest or pleasure in doing things Several days   Feeling down, depressed or hopeless Several days   Total Score PHQ 2 2     Alcohol Risk Factor Screening: On any occasion during the past 3 months, have you had more than 3 drinks containing alcohol? No    Do you average more than 7 drinks per week? No        Functional Ability and Level of Safety:     Hearing Loss   Complains of tinnitus    Activities of Daily Living   Self-care. Requires assistance with: no ADLs    Fall Risk   Fall Risk Assessment, last 12 mths 8/27/2014   Able to walk? Yes   Fall in past 12 months? No     Abuse Screen   Patient is not abused    Evaluation of Cognitive Function:  Mood/affect:  neutral  Appearance: age appropriate and casually dressed  Family member/caregiver input: none      Patient Care Team:  Diaz Pineda MD as PCP - General (Family Practice)  Peggy Tucker MD (Cardiology)  Manasa Rg MD (Orthopedic Surgery)      Advice/Referrals/Counseling   Education and counseling provided:  Are appropriate based on today's review and evaluation  End-of-Life planning (with patient's consent)      Assessment/Plan       ICD-10-CM ICD-9-CM    1. Routine general medical examination at a health care facility Z00.00 V70.0    2. Screening for alcoholism Z13.89 V79.1    3. Screening for depression Z13.89 V79.0    4. Encounter for medication refill R90.4 O40.3 folic acid (FOLVITE) 1 mg tablet   .

## 2017-08-15 NOTE — TELEPHONE ENCOUNTER
Faxed to [facility] REASON FOR VISIT  Follow-up for recurrent endometrial cancer.    HEMATOLOGIC / ONCOLOGIC HISTORY:  #1 The patient is a 81 year old female with multiple comorbidities as listed below including history of grade 1 endometrial adenocarcinoma diagnosed July 2013 when she presented with abdominal cramping. CT abdomen pelvis showing a uterine mass. Patient then underwent robotic-assisted modified radical hysterectomy with bilateral salpingo-oophorectomy, bilateral pelvic lymphadenectomy and para-aortic lymph node sampling on July 16, 2013. Pathology reported as stage IA grade 1 endometrial adenocarcinoma with myometrial invasion measuring 0.9 cm out of 1.9 cm total thickness, bilateral fallopian tubes and ovaries without pathologic abnormality. 15 out of 15 pelvic lymph nodes and one para-aortic lymph node negative for malignancy. Lymphovascular invasion not identified. Pelvic washings were negative for malignancy.    #2 underwent genetic testing on 12/3/2014. Based on genetic counselor's telephone note from 12/17/2014 \"I spoke to Galilea ROSARIO Brendon regarding negative Mujica syndrome testing through [facility]: Sequencing and rearrangement testing of MLH1, MSH2, MSH6, and PMS2, as well as rearrangement testing of EPCAM.   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  Therefore, her genetic testing as a whole did not explain her uterine cancer or the cancers in the family, but also does not mean she would have an increased cancer risk for Mujica syndrome cancers. Galilea was happy to hear this information.   We reviewed the test was negative for a mutation in either gene.  We discussed that these results can mean one of a few things:   1). Galilea Olivas has an undetectable gene mutation  2). there is another, unidentified gene associated with the cancers in the family  3). the cancer in the family is  due to a combination of unknown environmental and genetic factors  4). there is a mutation in the family, but Galilea Olivas did not inherit this mutation. \"    #3 patient was in her usual state of health until about March 2016 when she developed left lower quadrant abdominal pain with hematochezia. CT abdomen pelvis from March 2016 showed diverticulosis and inflammatory changes consistent with diverticulitis. Patient was treated with antibiotic therapy. She developed recurrent symptoms in March 2017 was evaluated by Dr. Rodriguez on sigmoidoscopy from March 16, 2017 noted reddish dark blood in the colonic lumen with scattered areas of diverticulosis without active bleeding. There was marked angulation and 40-45 centimeter. CT colonoscopy from March 28, 2017 showed 3.8 x 3.1 cm mass in the sigmoid colon occluding 75% of the lumen. Repeat sigmoidoscopy and biopsy of the mass revealed well to moderately differentiated adenocarcinoma most consistent with endometrial primary ( CDX negative, PAX positive, pCEA moderately positive). hMLH1 and h PMS2 deficient. HMSH2, hMSH6 intact.  Comment: These immunostain results strongly favor an endometrial primary. Alternatively, this tumor may have arisen from a focus of  endometriosis.     #4 PET CT scan from 03/30/2017 reported as FDG avid 4.5 x 3.6 cm sigmoid colonic mass with SUV max of 14.2. with FDG avid pericolonic fat stranding and FDG avid carol ann metastasis. Just distal to this mass, somewhat  focal area of FDG uptake involving the rectosigmoid colon and FDG uptake involving the cecum, favored to be physiologic. FDG uptake involving the GE junction. This may be physiologic, can consider further evaluation with EGD. Borderline aneurysm dilatation of the ascending aorta.     #5 Patient underwent laparoscopy assisted anterior proctosigmoidectomy with end to end coloproctostomy on 04/03/2017. Pathology was reported as sigmoid colon resection-well-differentiated adenocarcinoma  consistent with endometrial primary 3.2 cm with involvement of full-thickness of the colon. Tumor is very close but not transected at the site of peritoneal invasion. Proximal distal and radial mesenteric margins are free of tumor. Metastatic adenocarcinoma in 2 out of 22 lymph nodes. Colon anastomotic rings: Without evidence of malignancy.     Diagnosis Comment:     This patient had a FIGO grade 1 endometrial adenocarcinoma resected in 2013.     This lesion is likely a metastasis from that site but could also have arisen     in a focus of endometriosis, which is uncommon but reported.    #6 patient established care with Dr. Wilson medical oncology. She was recommended chemoradiotherapy with upfront carbotaxol chemotherapy followed by radiation therapy followed by carbotaxol chemotherapy (sandwich chemoradiation).    #7 patient is status post 2 cycles of carbotaxol chemotherapy. Her clinical course has been complicated by adult failure to thrive, deconditioning, cancer cachexia/anorexia, shortness of breath, dyspnea on exertion, chemotherapy and use nausea vomiting, grade 3 fatigue, fever, line associated DVT for which she is currently on therapeutic anticoagulation.    #8 patient has met with radiation oncology at [facility] and initiated on pelvic radiotherapy on 8/6/2017.      INTERVAL FOLLOW-UP:  Patient presents today for a follow-up visit, she is added on per patient's request. Patient denies headaches, vision changes, focal neurological deficits, dizziness or lightheadedness. Patient reports of an episode of presyncope artery this week. Denies any chest pain, shortness of breath, focal neurological deficits at that time. She denies any chest pain, shortness of breath, complains of dyspnea on exertion. Denies any cough or hemoptysis. Patient denies oral mucositis, nausea, vomiting, denies abdominal pain, bloating, vaginal spotting, bleeding, discharge, pelvic pain. Patient reports that fatigue is grade 1.  Denies any neuropathy. Mood is anxious and depressed at times, denies suicidal homicidal ideations, has occasional crying spells - improved, sleep is disturbed secondary to above symptoms, appetite is improving.    patient has met with radiation oncology at [facility] and initiated on pelvic radiotherapy on 8/6/2017.        PAST MEDICAL HISTORY:    HTN (hypertension)                                            Glaucoma                                                      Hypercholesteremia                                            Dengue fever                                                  Irregular heart beat                                            Comment: PVC's    Hyperglycemia                                                   Comment: pre diabetic    Endometrial adenocarcinoma (CMS/HCC)                            Comment: stage IA, grade 1    Other after-cataract, not obscuring vision      2/20/2014     ARMD (age related macular degeneration)         2/20/2014     Osteoporosis, unspecified                       08/20/2010      Comment: -2.5 dexa 11/2013    Colonic mass                                    3/23/2017     Diverticulitis                                                 PAST SURGICAL HISTORY:    HERNIA REPAIR                                   1951            Comment: right inguinal    APPENDECTOMY                                                  ECHOCARDIOGRAM                                  8/20/2010       Comment: Left ventricle normal size, EF 60%, mild                biatrial enlargement, mild to mod mitral                regurg, mild aortic regurg, mild tricuspid                regurg with slightly elevated pulmonary artery                systolic pressure    STRESS TEST                                     9/3/2010        Comment: negative study, cardiolite scan negative for                reversible ischemia, slightly hypertensive                response to exercise    MAMMO  SCREENING BILATERAL                       05/29/2012    PAP SMEAR,ROUTINE                               04/24/2012    DEXA BONE DENSITY AXIAL SKELETON                08/20/2010    LAPAROSCOPIC CHOLECYSTECTOMY                    08/20/2013    CATARACT EXTRACTION W/ INTRAOCULAR LENS IMPLANT 06/09/2011    CATARACT EXTRACTION W/ INTRAOCULAR LENS IMPLANT 05/12/2011    D AND C                                                       HYSTERECTOMY                                                    Comment: endometrial ca    COLONOSCOPY W BIOPSY                            9/22/2014       Comment:      MAMMO SCREENING BILATERAL                       11/21/2013    FLEXIBLE SIGMOIDOSCOPY                          03/16/2017      Comment: poss. HET    COLONOSCOPY                                     03/24/2017    SIGMOIDECTOMY                                   04/03/2017    LAPARASCOPY ABD W PLACE INTER DEV RAD THER      04/07/2017     FAMILY HISTORY:  Family History   Problem Relation Age of Onset   • Stomach Cancer Mother 78   • Colon cancer Mother 48   • Cancer Mother      colon, stomach   • Lung Cancer Father    • Cancer Father      lung   • Endometrial cancer Maternal Aunt 55       SOCIAL HISTORY:  Social History   Substance Use Topics   • Smoking status: Never Smoker   • Smokeless tobacco: Never Used   • Alcohol use 0.6 oz/week     1 Standard drinks or equivalent per week      Comment: once a month       REVIEW OF SYSTEMS:  Marilee Islas MA  8/15/2017 10:16 AM  Sign at close encounter  Eye Problem(s):visual blurring  ENT Problem(s):negative  Cardiovascular problem(s):negative  Respiratory problem(s):negative  Gastro-intestinal problem(s):ostomy  Genito-urinary problem(s):negative  Musculoskeletal problem(s):left neck, shoulder  Integumentary problem(s):ostomy   Neurological problem(s):negative  Psychiatric problem(s):anxiety  Endocrine problem(s):negative  Hematologic and/or Lymphatic problem(s):negative  Fatigue is mild to  moderate          A 14-point review of system was otherwise unremarkable except for the problems described above.      MEDICATIONS:  Current Outpatient Prescriptions   Medication Sig Dispense Refill   • valsartan (DIOVAN) 160 MG tablet Take 1 tablet by mouth daily. 30 tablet 11   • dexamethasone (DECADRON) 0.5 MG tablet Take 2 tablets by mouth 2 times daily (with meals). 60 tablet 1   • OLANZapine (ZYPREXA) 5 MG tablet Take 1 tablet by mouth nightly. 30 tablet 1   • omeprazole (PRILOSEC) 20 MG capsule Take 20 mg by mouth daily.     • enoxaparin (LOVENOX) 80 MG/0.8ML injectable solution Inject 0.7 mLs into the skin daily. 21 mL 1   • prochlorperazine (COMPAZINE) 10 MG tablet Take 1 tablet by mouth every 6 hours as needed for Nausea or Vomiting. 30 tablet 6   • loperamide (IMODIUM) 2 MG capsule Take 2 mg by mouth 4 times daily as needed for Diarrhea.     • lactobacillus acidophilus (BACID) Take 1 tablet by mouth daily. 30 tablet 0   • rosuvastatin (CRESTOR) 20 MG tablet Take 1 tablet by mouth daily. 90 tablet 3   • Multiple Vitamins-Minerals (OCUVITE ADULT 50+) CAPS Take 1 capsule by mouth daily.       No current facility-administered medications for this visit.        ALLERGIES:  ALLERGIES:   Allergen Reactions   • Metoprolol SHORTNESS OF BREATH   • Cogentin [Benztropine Mesylate]      Hallucinations per patient in 1965       OBJECTIVE:    Vitals:    Visit Vitals  /66 (BP Location: [facility] [place], Patient Position: Sitting, Cuff Size: Regular)   Pulse 73   Temp 97.9 °F (36.6 °C) (Oral)   Resp 16   Wt 47.1 kg   SpO2 100%   BMI 20.99 kg/m²        ECOG 2, pain scale 0.    General:  Appears stated age, no apparent distress.   HEENT:  Pupils equally round, reactive to light with extraocular movements intact. Anicteric sclerae, no mucositis.   Neck:  Supple with no cervical or supraclavicular lymphadenopathy.  No JVD (jugular venous distension) or carotid bruit.  There is no thyromegaly.   Lungs:  Normal vesicular breathing,  bibasilar crackles.  Cardiovascular:  Regular rate and rhythm.  No S3, S4 or any murmurs.  There is no pericardial rub.   Abdomen:  Soft, nontender, no hepatosplenomegaly.  Bowel sounds positive. + Ostomy in place.  Extremities:  No cyanosis, clubbing, or edema.  Peripheral pulses palpable.   Lymphatics:  There is no cervical, supraclavicular, axillary or inguinal lymphadenopathy.   Skin:  No bruises or petechiae seen.   Neurologic:  Cranial nerves grossly intact.  Normal bulk, tone, power 5/5 globally, DTR (deep tendon reflexes) 2+.      LABS:  Lab Services on 08/15/2017   Component Date Value Ref Range Status   • WBC 08/15/2017 7.3  4.2 - 11.0 K/mcL Final   • RBC 08/15/2017 3.08* 4.00 - 5.20 mil/mcL Final   • HGB 08/15/2017 9.5* 12.0 - 15.5 g/dL Final   • HCT 08/15/2017 27.9* 36.0 - 46.5 % Final   • MCV 08/15/2017 90.6  78.0 - 100.0 fl Final   • MCH 08/15/2017 30.8  26.0 - 34.0 pg Final   • MCHC 08/15/2017 34.1  32.0 - 36.5 g/dL Final   • RDW-CV 08/15/2017 14.9  11.0 - 15.0 % Final   • PLT 08/15/2017 222  140 - 450 K/mcL Final   • DIFF TYPE 08/15/2017 AUTO DIFF verified by manual smear review.   Final   • Neutrophil 08/15/2017 61  % Final   • LYMPH 08/15/2017 20  % Final   • MONO 08/15/2017 13  % Final   • EOSIN 08/15/2017 6  % Final   • BASO 08/15/2017 0  % Final   • Absolute Neutrophil 08/15/2017 4.5  1.8 - 7.7 K/mcL Final   • Absolute Lymph 08/15/2017 1.4  1.0 - 4.0 K/mcL Final   • Absolute Mono 08/15/2017 0.9  0.3 - 0.9 K/mcL Final   • Absolute Eos 08/15/2017 0.4  0.1 - 0.5 K/mcL Final   • Absolute Baso 08/15/2017 0.0  0.0 - 0.3 K/mcL Final   • Large Platelets 08/15/2017 PRESENT   Final   • Fasting Status 08/15/2017 UNKNOWN  hrs Final   • Sodium 08/15/2017 135  135 - 145 mmol/L Final   • Potassium 08/15/2017 4.3  3.4 - 5.1 mmol/L Final   • Chloride 08/15/2017 106  98 - 107 mmol/L Final   • Carbon Dioxide 08/15/2017 17* 21 - 32 mmol/L Final   • Anion Gap 08/15/2017 16  10 - 20 mmol/L Final   • Glucose  08/15/2017 124* 65 - 99 mg/dL Final   • BUN 08/15/2017 49* 6 - 20 mg/dL Final   • Creatinine 08/15/2017 1.38* 0.51 - 0.95 mg/dL Final   • GFR Estimate,  08/15/2017 42   Final   • GFR Estimate, Non  08/15/2017 36   Final   • BUN/Creatinine Ratio 08/15/2017 36* 7 - 25 Final   • CALCIUM 08/15/2017 10.3* 8.4 - 10.2 mg/dL Final   • TOTAL BILIRUBIN 08/15/2017 0.7  0.2 - 1.0 mg/dL Final   • AST/SGOT 08/15/2017 19  <38 Units/L Final   • ALT/SGPT 08/15/2017 43  <79 Units/L Final   • ALK PHOSPHATASE 08/15/2017 82  45 - 117 Units/L Final   • TOTAL PROTEIN 08/15/2017 7.4  6.4 - 8.2 g/dL Final   • Albumin 08/15/2017 3.9  3.6 - 5.1 g/dL Final   • GLOBULIN 08/15/2017 3.5  2.0 - 4.0 g/dL Final   • A/G Ratio, Serum 08/15/2017 1.1  1.0 - 2.4 Final   Lab Services on 08/10/2017   Component Date Value Ref Range Status   • WBC 08/10/2017 7.2  4.2 - 11.0 K/mcL Final   • RBC 08/10/2017 3.05* 4.00 - 5.20 mil/mcL Final   • HGB 08/10/2017 9.4* 12.0 - 15.5 g/dL Final   • HCT 08/10/2017 27.9* 36.0 - 46.5 % Final   • MCV 08/10/2017 91.5  78.0 - 100.0 fl Final   • MCH 08/10/2017 30.8  26.0 - 34.0 pg Final   • MCHC 08/10/2017 33.7  32.0 - 36.5 g/dL Final   • RDW-CV 08/10/2017 15.1* 11.0 - 15.0 % Final   • PLT 08/10/2017 256  140 - 450 K/mcL Final   • DIFF TYPE 08/10/2017 AUTO DIFF verified by manual smear review.   Final   • Neutrophil 08/10/2017 69  % Final   • LYMPH 08/10/2017 20  % Final   • MONO 08/10/2017 10  % Final   • EOSIN 08/10/2017 1  % Final   • BASO 08/10/2017 0  % Final   • Absolute Neutrophil 08/10/2017 5.0  1.8 - 7.7 K/mcL Final   • Absolute Lymph 08/10/2017 1.5  1.0 - 4.0 K/mcL Final   • Absolute Mono 08/10/2017 0.7  0.3 - 0.9 K/mcL Final   • Absolute Eos 08/10/2017 0.1  0.1 - 0.5 K/mcL Final   • Absolute Baso 08/10/2017 0.0  0.0 - 0.3 K/mcL Final   • Fasting Status 08/10/2017 UNKNOWN  hrs Final   • Sodium 08/10/2017 136  135 - 145 mmol/L Final   • Potassium 08/10/2017 4.6  3.4 - 5.1 mmol/L Final    • Chloride 08/10/2017 107  98 - 107 mmol/L Final   • Carbon Dioxide 08/10/2017 17* 21 - 32 mmol/L Final   • Anion Gap 08/10/2017 17  10 - 20 mmol/L Final   • Glucose 08/10/2017 100* 65 - 99 mg/dL Final   • BUN 08/10/2017 43* 6 - 20 mg/dL Final   • Creatinine 08/10/2017 1.38* 0.51 - 0.95 mg/dL Final   • GFR Estimate,  08/10/2017 42   Final   • GFR Estimate, Non  08/10/2017 36   Final   • BUN/Creatinine Ratio 08/10/2017 31* 7 - 25 Final   • CALCIUM 08/10/2017 10.4* 8.4 - 10.2 mg/dL Final   • TOTAL BILIRUBIN 08/10/2017 0.5  0.2 - 1.0 mg/dL Final   • AST/SGOT 08/10/2017 14  <38 Units/L Final   • ALT/SGPT 08/10/2017 33  <79 Units/L Final   • ALK PHOSPHATASE 08/10/2017 90  45 - 117 Units/L Final   • TOTAL PROTEIN 08/10/2017 7.8  6.4 - 8.2 g/dL Final   • Albumin 08/10/2017 4.2  3.6 - 5.1 g/dL Final   • GLOBULIN 08/10/2017 3.6  2.0 - 4.0 g/dL Final   • A/G Ratio, Serum 08/10/2017 1.2  1.0 - 2.4 Final   Lab Services on 08/03/2017   Component Date Value Ref Range Status   • WBC 08/03/2017 6.0  4.2 - 11.0 K/mcL Final   • RBC 08/03/2017 3.27* 4.00 - 5.20 mil/mcL Final   • HGB 08/03/2017 9.9* 12.0 - 15.5 g/dL Final   • HCT 08/03/2017 29.5* 36.0 - 46.5 % Final   • MCV 08/03/2017 90.2  78.0 - 100.0 fl Final   • MCH 08/03/2017 30.3  26.0 - 34.0 pg Final   • MCHC 08/03/2017 33.6  32.0 - 36.5 g/dL Final   • RDW-CV 08/03/2017 15.9* 11.0 - 15.0 % Final   • PLT 08/03/2017 296  140 - 450 K/mcL Final   • DIFF TYPE 08/03/2017 AUTOMATED DIFFERENTIAL   Final   • Neutrophil 08/03/2017 53  % Final   • LYMPH 08/03/2017 34  % Final   • MONO 08/03/2017 10  % Final   • EOSIN 08/03/2017 2  % Final   • BASO 08/03/2017 1  % Final   • Absolute Neutrophil 08/03/2017 3.2  1.8 - 7.7 K/mcL Final   • Absolute Lymph 08/03/2017 2.0  1.0 - 4.0 K/mcL Final   • Absolute Mono 08/03/2017 0.6  0.3 - 0.9 K/mcL Final   • Absolute Eos 08/03/2017 0.1  0.1 - 0.5 K/mcL Final   • Absolute Baso 08/03/2017 0.1  0.0 - 0.3 K/mcL Final   •  Fasting Status 08/03/2017 UNKNOWN  hrs Final   • Sodium 08/03/2017 136  135 - 145 mmol/L Final   • Potassium 08/03/2017 4.2  3.4 - 5.1 mmol/L Final   • Chloride 08/03/2017 103  98 - 107 mmol/L Final   • Carbon Dioxide 08/03/2017 19* 21 - 32 mmol/L Final   • Anion Gap 08/03/2017 18  10 - 20 mmol/L Final   • Glucose 08/03/2017 162* 65 - 99 mg/dL Final   • BUN 08/03/2017 43* 6 - 20 mg/dL Final   • Creatinine 08/03/2017 1.54* 0.51 - 0.95 mg/dL Final   • GFR Estimate,  08/03/2017 37   Final   • GFR Estimate, Non  08/03/2017 32   Final   • BUN/Creatinine Ratio 08/03/2017 28* 7 - 25 Final   • CALCIUM 08/03/2017 10.3* 8.4 - 10.2 mg/dL Final   • TOTAL BILIRUBIN 08/03/2017 0.6  0.2 - 1.0 mg/dL Final   • AST/SGOT 08/03/2017 19  <38 Units/L Final   • ALT/SGPT 08/03/2017 36  <79 Units/L Final   • ALK PHOSPHATASE 08/03/2017 90  45 - 117 Units/L Final   • TOTAL PROTEIN 08/03/2017 8.0  6.4 - 8.2 g/dL Final   • Albumin 08/03/2017 4.2  3.6 - 5.1 g/dL Final   • GLOBULIN 08/03/2017 3.8  2.0 - 4.0 g/dL Final   • A/G Ratio, Serum 08/03/2017 1.1  1.0 - 2.4 Final       O POSITIVE       DIAGNOSTIC STUDIES:  Ct Neck Soft Tissue    Result Date: 6/17/2017  CT NECK SOFT TISSUE W CONTRAST HISTORY:  HEMATOMA, NECK, TRAUMATIC COMPARISON:   None. TECHNIQUE: 150 mL of Isovue-300 was administered. FINDINGS: Bilateral carotid and left jugular veins are patent. The right jugular vein is patent to the level of the hyoid where it is occluded. Right internal jugular vein is expanded by a relatively hypodense thrombus up to the level of its junction with the subclavian vein. There is a PowerPort catheter present on the right. There is calcification at the carotid bifurcations. The visualized sinuses are clear. Posterior nasopharynx and tonsils are symmetric and unremarkable. The tongue shows no abnormality. The parotid and submandibular glands appear unremarkable. No pathologic lymphadenopathy is seen in the neck. Epiglottis  and aryepiglottic folds, vocal cords and subglottic trachea are unremarkable. Thyroid lobes are unremarkable. There is mild heterogeneous enlargement of the right sternocleidomastoid muscle.  Ill-defined increased opacity is seen deep to the right sternocleidomastoid surrounding the expanded, occluded right internal jugular vein to the base of the neck although a focal rounded hematoma was not identified. There is subcutaneous edema present in the mid and lower right neck.     IMPRESSION:  Occlusion of the right internal jugular vein from the level of the mid neck to the level of the subclavian vein. Mild heterogeneous enlargement of the right sternocleidomastoid muscle compatible with hemorrhage within it due to trauma.  There is also ill-defined hemorrhage in the right neck deep to the sternocleidomastoid muscle surrounding the occluded internal jugular vein although a discrete rounded hematoma is not seen. Results were phoned to [facility] on 6/17/2017 9:31 AM.     Ct Chest Pe Imaging    Result Date: 7/17/2017  EXAM: CT ANGIOGRAM CHEST PE IMAGING- 3D HISTORY: Female 81 years SHORTNESS OF BREATH. COMPARISON: FDG PET CT scan on 3/30/2017. TECHNIQUE:  CT of the chest performed during the administration of 75 mL of intravenous Isovue-370 contrast.  Data acquisition was obtained during the pulmonary arterial phase of enhancement.  3D reconstruction and 2D multiplanar reformations were also sent to PACS with the primary data set. FINDINGS: VASCULAR Quality: Good opacification of the pulmonary arteries.    PE Assessment: No evidence for pulmonary embolism.    Main PA Diameter: Not significantly dilated. Thoracic aorta: Ectasia of the ascending thoracic aorta measuring up to 3.8 cm. Heart: Size within normal limits. No pericardial effusion. LUNGS Pleura: No pleural effusion, thickening, or pneumothorax. Parenchyma: Mild linear left basilar atelectasis/scarring. 9 mm bleb in the right lower lobe (series 4, image  93). The lung parenchyma is otherwise unremarkable. No suspicious pulmonary nodule or mass is seen. Airways: Central airways are patent. CHEST Neck Base/Thyroid: No mass. Mediastinum/Lymph nodes: No enlarged lymph nodes or masses. Esophagus: No pathologic distension or obvious mass. SUPPORT DEVICES: A right Mediport catheter is seen with tip in the superior vena cava. BONES/SOFT TISSUES: No significant lesion. UPPER ABDOMEN: The gallbladder is surgically absent. Several fluid attenuating lesions are again seen in the left lobe of the liver measuring up to 2.3 cm, compatible with cysts. There are 2 subcentimeter hypoattenuating lesions again seen in the liver which are too small to catheterize further but statistically represent cysts.     IMPRESSION: 1. No evidence for pulmonary embolism. 2. No acute findings.     Ir Follow Up Visit    Result Date: 6/9/2017  Right port incision clean, dry, and intact. The patient denies swelling, redness, fevers, and night sweats.    Us Upper Extremity Venous Duplex Right    Result Date: 6/19/2017  US UPPER EXTREMITY VENOUS DUPLEX RIGHT, June 19, 2017 History: Known right IJ DVT noted on CT neck June 17, 2017 Comparison: Neck CT June 17, 2017 Technique: Grayscale, color Doppler and spectral Doppler imaging of the veins of the right arm. Findings: Again demonstrated is hypoechoic, occlusive thrombus throughout the right internal jugular vein. The distal right subclavian vein is patent. The proximal and mid right subclavian vein were not visualized secondary to bandaging. The right axillary and paired brachial veins were fully compressible with normal augmentation. In addition, color and pulsed Doppler demonstrate appropriate spontaneous flow, cardiac pulsatility and variation with respiration. The right cephalic vein in the forearm is noncompressible with hypoechoic, occlusive thrombus noted.     Impression: 1. Redemonstration of occlusive, acute appearing DVT in the right internal  jugular vein. 2. Superficial thrombophlebitis of the right cephalic vein in the forearm.        ASSESSMENT:  #1 recurrent endometrial cancer, currently on chemoradiotherapy, please see details above. Status post 2 cycles of carbotaxol chemotherapy with poor chemotherapy tolerance.  #2 shortness of breath and dyspnea on exertion.  #3 chemotherapy induced nausea vomiting.  #4 cancer cachexia/anorexia  #5 adult failure to thrive/deconditioning.  #6 right upper extremity line associated DVT currently on therapeutic anticoagulation.  #7 normocytic anemia.  #8 multiple other comorbidities as listed above.  #9 cancer fatigue grade 1.  #10 on pelvic radiotherapy initiated 8/6/2017 at [facility].  #11 adjustment disorder anxiety and depression.    Plan:   #1 discussed patient's clinical course with patient and family in detail.  #2 discussed diagnosis of recurrent endometrial cancer, pathology, staging, natural history, treatment options and prognosis at length with the patient.  #3 reviewed NCCN guidelines for care for endometrial cancer with the patient in detail.  #4 we had previously and again today discussed that treatment for recurrent/metastatic endometrial cancer is palliative in intent.  #5 given above symptoms, poor chemotherapy tolerance, we had previously discussed about proceeding to radiation therapy at this time which the patient was agreeable to. patient has initiated radiotherapy at [facility]. Continued follow-up with radiation oncology as recommended.  #6 given shortness of breath and dyspnea on exertion in the setting of known underlying associated DVT, CT PE protocol was obtained. Discussed results of CT PE protocol from 7/17/2017 with the patient in detail which is reported as negative for PE. No acute findings.Also, transthoracic echocardiogram was previously obtained. Discussed results of the thoracic echocardiogram from 7/17/2017 with the patient in detail which is reported as EF of  65%. Diastolic dysfunction grade 1, trace to mild aortic regurgitation. Mildly increased left atrial size.  #7 for normocytic anemia, labs were previously requested. Discussed lab results from 7/12/2017 with the patient in detail which is reported as normal total bilirubin, LDH, haptoglobin, Rae negative, vitamin B12, RBC folate, ceruloplasmin within normal limits. Iron studies show adequate iron stores. Normal absolute reticulocyte count/low reticulocyte proliferative index. Serum protein electrophoresis/ALEXANDREA was reported as    Interpretation:     A. Serum Protein Electrophoresis:       Normal electrophoretic pattern.  No monoclonal protein is identified.         B. Serum Immunofixation with Quant. IG:       A very small amount of IgA kappa monoclonal protein is present. It is not     detected by SPE.         Normal light chain ratio.    We have discussed about obtaining bone marrow biopsy which the patient requested to defer. Readdress in the future. Continue to follow clinically.    #8 for adult failure to thrive/deconditioning, we discussed about physical therapy referral, readdress upon follow-up.  #9 for cancer cachexia/anorexia, recommend dietitian evaluation. continue low-dose dexamethasone.  #10 for chemotherapy-induced nausea vomiting continue Compazine, Zofran p.r.n.  #11 for adjustment disorder anxiety and depression, continue Zyprexa 5 mg p.o. daily. Route, schedule, potential adverse effects of Zyprexa have been discussed with the patient in detail. She was advised against abrupt discontinuation.  #12 continued follow-up with surgical oncology/gynecologic oncology as recommended. continued follow-up with radiation oncology as recommended.  #13 recommend continued follow-up with primary care physician for chronic medical conditions and healthcare maintenance. Age-appropriate screening as per the primary care physician.  #14 continue weekly nurse visits for labs and supportive care as needed. Patient  to return to clinic in 4 weeks' time for follow-up or sooner if needed.          Patient indicates/verbalized understanding of the diagnosis and agrees with the above plan of care.  Patient was encouraged to contact me or the clinical nurse navigator prior to the next appointment should they have any questions and or concerns.

## 2017-08-22 DIAGNOSIS — F41.8 DEPRESSION WITH ANXIETY: ICD-10-CM

## 2017-08-22 DIAGNOSIS — Z76.0 ENCOUNTER FOR MEDICATION REFILL: ICD-10-CM

## 2017-08-24 RX ORDER — LORAZEPAM 1 MG/1
1 TABLET ORAL
Qty: 60 TAB | Refills: 0 | Status: SHIPPED | OUTPATIENT
Start: 2017-08-24 | End: 2017-09-29 | Stop reason: SDUPTHER

## 2017-08-31 ENCOUNTER — TELEPHONE (OUTPATIENT)
Dept: FAMILY MEDICINE CLINIC | Age: 67
End: 2017-08-31

## 2017-08-31 RX ORDER — PROMETHAZINE HYDROCHLORIDE 25 MG/1
25 TABLET ORAL
Qty: 20 TAB | Refills: 0 | Status: SHIPPED | OUTPATIENT
Start: 2017-08-31 | End: 2020-06-09

## 2017-08-31 NOTE — TELEPHONE ENCOUNTER
Patient is calling because she received the shingles vaccine on Tuesday and is feeling nauseated and would like to know if Dr. Sandeep Becerril could call her in some medication to the [facility].   Thank you,  Donte Higgins

## 2017-09-25 ENCOUNTER — TELEPHONE (OUTPATIENT)
Dept: FAMILY MEDICINE CLINIC | Age: 67
End: 2017-09-25

## 2017-09-25 NOTE — TELEPHONE ENCOUNTER
Patient states that she is taking 2 Lorazepam daily. She states that she was taking 3 a day and felt like she did better on that dose. Please advise at [phone].

## 2017-10-02 NOTE — TELEPHONE ENCOUNTER
Per Dr. Keerthi Mckeon, \"Prescription printed for patient / fax.    Will not increase number of lorazepam. \"

## 2017-10-04 RX ORDER — IBUPROFEN 400 MG/1
TABLET ORAL
Qty: 60 TAB | Refills: 0 | OUTPATIENT
Start: 2017-10-04

## 2017-10-10 NOTE — TELEPHONE ENCOUNTER
Please call the patient to discuss further why she shouldn't take the ibuprofen with the Plavix at [phone]

## 2017-10-17 ENCOUNTER — OFFICE VISIT (OUTPATIENT)
Dept: FAMILY MEDICINE CLINIC | Age: 67
End: 2017-10-17

## 2017-10-17 VITALS
RESPIRATION RATE: 20 BRPM | SYSTOLIC BLOOD PRESSURE: 143 MMHG | DIASTOLIC BLOOD PRESSURE: 71 MMHG | HEART RATE: 68 BPM | WEIGHT: 166 LBS | TEMPERATURE: 96 F | HEIGHT: 62 IN | OXYGEN SATURATION: 99 % | BODY MASS INDEX: 30.55 KG/M2

## 2017-10-17 DIAGNOSIS — I25.10 CORONARY ARTERY DISEASE DUE TO LIPID RICH PLAQUE: ICD-10-CM

## 2017-10-17 DIAGNOSIS — F33.1 MODERATE EPISODE OF RECURRENT MAJOR DEPRESSIVE DISORDER (HCC): ICD-10-CM

## 2017-10-17 DIAGNOSIS — Z00.00 MEDICARE ANNUAL WELLNESS VISIT, SUBSEQUENT: ICD-10-CM

## 2017-10-17 DIAGNOSIS — Z12.39 SCREENING FOR BREAST CANCER: Primary | ICD-10-CM

## 2017-10-17 DIAGNOSIS — E78.5 HYPERLIPIDEMIA, UNSPECIFIED HYPERLIPIDEMIA TYPE: ICD-10-CM

## 2017-10-17 DIAGNOSIS — I25.83 CORONARY ARTERY DISEASE DUE TO LIPID RICH PLAQUE: ICD-10-CM

## 2017-10-17 DIAGNOSIS — H54.7 BLINDNESS: ICD-10-CM

## 2017-10-17 DIAGNOSIS — F41.9 ANXIETY: ICD-10-CM

## 2017-10-17 DIAGNOSIS — Z12.11 ENCOUNTER FOR SCREENING COLONOSCOPY: ICD-10-CM

## 2017-10-17 RX ORDER — DEXTROMETHORPHAN HYDROBROMIDE, GUAIFENESIN 5; 100 MG/5ML; MG/5ML
650 LIQUID ORAL
Qty: 30 TAB | Refills: 2 | Status: SHIPPED | OUTPATIENT
Start: 2017-10-17 | End: 2019-08-13

## 2017-10-17 RX ORDER — LIDOCAINE 40 MG/G
CREAM TOPICAL
Qty: 15 G | Refills: 0 | Status: SHIPPED | OUTPATIENT
Start: 2017-10-17 | End: 2020-03-12

## 2017-10-17 NOTE — PROGRESS NOTES
Health Maintenance Due   Topic Date Due    DTaP/Tdap/Td series (1 - Tdap) 04/22/1973    GLAUCOMA SCREENING Q2Y  04/22/2017    OSTEOPOROSIS SCREENING (DEXA)  04/22/2017    INFLUENZA AGE 9 TO ADULT  08/01/2017

## 2017-10-17 NOTE — PROGRESS NOTES
[facility],    [address] with [facility] and [facility]            HPI     CC: Medication Refill    Lorena Lunsford is a 74 y.o. female with history arthritis who presents for routine follow up. HLD   Pt does not follow a particular diet. Denies chest pain, shortness of breath,lower extremity pain. Panic Attacks ,Depression, Anxiety   Pt has been taking  Ativan for 10 years, 1 ativan twice a day. Pt cannot sleep at night if she does not get any medication. Pt has been without Ativan with almost a week. Pt also takes Wellbutrin daily and Trazodone prn for sleep. Pt reports she is struggling with depression but denies SI, HI.      CAD s/p 5 way bypass 2007 at [address]  Pt takes aspirin 81 mg. She has not had the need to use nitro, except one time few years ago. 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 She gets more stress due to feeling more disoriented. Pt reports she has good support form male . Arthritis   Pt was taking Motrin, but was recently told to stop taking the medication due to its interaction with Plavix     Wheezing   Pt takes albuterol occasionally. Denies history of smoking. PMHx:  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- [facility]- has GRIFFIN       Meds:   Current Outpatient Prescriptions   Medication Sig Dispense Refill    raNITIdine (ZANTAC) 150 mg tablet Take 1 Tab by mouth nightly. 90 Tab 2    clopidogrel (PLAVIX) 75 mg tab Take 1 Tab by mouth daily. 90 Tab 1    simvastatin (ZOCOR) 40 mg tablet TAKE ONE TABLET BY MOUTH NIGHTLY 90 Tab 0    promethazine (PHENERGAN) 25 mg tablet Take 1 Tab by mouth every six (6) hours as needed for Nausea. 20 Tab 0    folic acid (FOLVITE) 1 mg tablet Take 1 Tab by mouth daily. 90 Tab 1    cyanocobalamin 1,000 mcg tablet Take 1 Tab by mouth daily. 90 Tab 1    LORazepam (ATIVAN) 1 mg tablet Take 1 Tab by mouth every eight (8) hours as needed for Anxiety. 60 Tab 0    fluticasone (FLONASE) 50 mcg/actuation nasal spray 2 Sprays by Both Nostrils route daily. 1 Bottle 2    atenolol (TENORMIN) 50 mg tablet TAKE ONE TABLET BY MOUTH DAILY 90 Tab 1    buPROPion XL (WELLBUTRIN XL) 150 mg tablet Take 1 Tab by mouth every morning. 90 Tab 1    cetirizine (ZYRTEC) 10 mg tablet Take 1 Tab by mouth daily. 30 Tab 11    furosemide (LASIX) 20 mg tablet Take 1 Tab by mouth daily. 90 Tab 1    potassium chloride SA (MICRO-K) 10 mEq capsule TAKE ONE CAPSULE BY MOUTH TWICE A  Cap 1    calcitRIOL (ROCALTROL) 0.25 mcg capsule Take 1 Cap by mouth daily. 90 Cap 1    traZODone (DESYREL) 50 mg tablet Take 1 Tab by mouth nightly. Indications: insomnia associated with depression 30 Tab 5    albuterol (PROVENTIL HFA, VENTOLIN HFA, PROAIR HFA) 90 mcg/actuation inhaler Take 2 Puffs by inhalation every four (4) hours as needed for Wheezing. 1 Inhaler 5    albuterol (VENTOLIN HFA) 90 mcg/actuation inhaler Take 1 Puff by inhalation every six (6) hours as needed for Wheezing. 1 Inhaler 2    nitroglycerin (NITROSTAT) 0.4 mg SL tablet 1 Tab by SubLINGual route as needed for Chest Pain. 1 Bottle 3    aspirin 81 mg tablet Take 81 mg by mouth.  LORazepam (ATIVAN) 1 mg tablet Take 1 Tab by mouth every eight (8) hours as needed for Anxiety. 60 Tab 0    ibuprofen (MOTRIN) 400 mg tablet TAKE ONE TABLET BY MOUTH EVERY 6 HOURS AS NEEDED FOR PAIN 60 Tab 0    venlafaxine-SR (EFFEXOR-XR) 37.5 mg capsule Take 1 Cap by mouth daily. 90 Cap 1    triamcinolone acetonide (KENALOG) 0.1 % ointment Apply  to affected area two (2) times a day. use thin layer 30 g 0       Allergies:    Allergies   Allergen Reactions    Percocet [Oxycodone-Acetaminophen] Itching       Smoker:  History   Smoking Status    Never Smoker Smokeless Tobacco    Never Used       ETOH:   History   Alcohol Use    Yes     Comment: social       FH:   Family History   Problem Relation Age of Onset    No Known Problems Mother     No Known Problems Father        ROS:  Review of Systems   Constitutional: Negative for activity change, appetite change, chills, fatigue and fever. Respiratory: Negative for chest tightness and shortness of breath. Cardiovascular: Negative for chest pain, palpitations and leg swelling. Gastrointestinal: Negative for abdominal pain, diarrhea, nausea and vomiting. Genitourinary: Negative for dysuria, flank pain and hematuria. Musculoskeletal: Negative for back pain and joint swelling. Skin: Negative for rash. Neurological: Negative for dizziness, weakness, numbness and headaches. Psychiatric/Behavioral: Negative for decreased concentration, hallucinations and suicidal ideas. The patient is not nervous/anxious. Physical Exam:  Visit Vitals    /71    Pulse 68    Temp 96 °F (35.6 °C) (Oral)    Resp 20    Ht 5' 2\" (1.575 m)    Wt 166 lb (75.3 kg)    SpO2 99%    BMI 30.36 kg/m2       Wt Readings from Last 3 Encounters:   10/17/17 166 lb (75.3 kg)   07/17/17 170 lb (77.1 kg)   04/18/17 167 lb (75.8 kg)     BP Readings from Last 3 Encounters:   10/17/17 143/71   07/17/17 144/74   04/18/17 162/76        Physical Exam   Constitutional: She is oriented to person, place, and time. She appears well-developed and well-nourished. HENT:   Head: Atraumatic. Eyes: Conjunctivae and EOM are normal.   Neck: Neck supple. Cardiovascular: Normal rate, regular rhythm, normal heart sounds and intact distal pulses. Pulmonary/Chest: Effort normal and breath sounds normal. No respiratory distress. Abdominal: Bowel sounds are normal.   Musculoskeletal: Normal range of motion. She exhibits no edema. Neurological: She is alert and oriented to person, place, and time. No cranial nerve deficit.    Skin: Skin is warm. No erythema. Psychiatric: She has a normal mood and affect. Her behavior is normal.            Assessment     74 y.o. female presents with history of:  Patient Active Problem List   Diagnosis Code    Anxiety F41.9    Hyperlipidemia E78.5    CAD (coronary artery disease) I25.10    Blindness H54.7    HTN (hypertension) I10    Osteopenia M85.80    B12 deficiency E53.8    Insomnia G47.00    Allergic rhinitis J30.9    Gastroesophageal reflux disease without esophagitis K21.9    Moderate episode of recurrent major depressive disorder ([facility] [address] F33.1       Today's diagnoses are:    ICD-10-CM ICD-9-CM    1. Screening for breast cancer Z12.31 V76.10 JIA MAMMO BI SCREENING INCL CAD      REFERRAL FOR COLONOSCOPY      DEXA BONE DENSITY STUDY AXIAL   2. Medicare annual wellness visit, subsequent Z00.00 V70.0    3. Encounter for screening colonoscopy Z12.11 V76.51               Plan     ·  Medicare Wellness Visit    - Referral for colonoscopy,mammogram,DEXA scan   - Pt to return for pap smear    - Advanced directive paperwork to be filled out by daughter   - Marvin Andrade pt to bring paperwork next visit     - Pt to get flu shot and TDAP at [facility]      Patient Care Team:  Reji Millard MD as PCP - General (Family Practice)  Michael Arrieta MD (Cardiology)  Yumiko Tolentino MD (Orthopedic Surgery)    Follow-up Disposition:  Return in about 2 weeks (around 10/31/2017) for pap smear . Prior labs and imaging were reviewed. I have discussed the diagnosis with the patient and the intended plan as seen in the above orders. The patient has received an after-visit summary and questions were answered concerning future plans. I have discussed medication side effects and warnings with the patient as well. Patient discussed  with Fermín Becerra , Attending Physician.     Chris Rothman MD    [address] [place] [address], PGY2               Date of visit: 10/17/2017       This is an Initial Medicare Annual Wellness Visit (AWV), (Performed more than 12 months after effective date of Medicare Part B enrollment and 12 months after last preventive visit, Once in a lifetime)    I have reviewed the patient's medical history in detail and updated the computerized patient record. Fredi Yang is a 74 y.o. female   History obtained from: the patient. Concerns today   Patient understands that medical problems addressed today may incur additional cost as this is a preventive visit      History     Patient Active Problem List   Diagnosis Code    Anxiety F41.9    Hyperlipidemia E78.5    CAD (coronary artery disease) I25.10    Blindness H54.7    HTN (hypertension) I10    Osteopenia M85.80    B12 deficiency E53.8    Insomnia G47.00    Allergic rhinitis J30.9    Gastroesophageal reflux disease without esophagitis K21.9    Moderate episode of recurrent major depressive disorder ([facility] [address] F33.1     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- [facility]- has GRIFFIN      Past Surgical History:   Procedure Laterality Date    CABG, VEIN, FIVE  2006    CARDIAC SURG PROCEDURE UNLIST      HX ORTHOPAEDIC       Allergies   Allergen Reactions    Percocet [Oxycodone-Acetaminophen] Itching     Current Outpatient Prescriptions   Medication Sig Dispense Refill    raNITIdine (ZANTAC) 150 mg tablet Take 1 Tab by mouth nightly. 90 Tab 2    clopidogrel (PLAVIX) 75 mg tab Take 1 Tab by mouth daily. 90 Tab 1    simvastatin (ZOCOR) 40 mg tablet TAKE ONE TABLET BY MOUTH NIGHTLY 90 Tab 0    promethazine (PHENERGAN) 25 mg tablet Take 1 Tab by mouth every six (6) hours as needed for Nausea. 20 Tab 0    folic acid (FOLVITE) 1 mg tablet Take 1 Tab by mouth daily. 90 Tab 1    cyanocobalamin 1,000 mcg tablet Take 1 Tab by mouth daily.  90 Tab 1    LORazepam (ATIVAN) 1 mg tablet Take 1 Tab by mouth every eight (8) hours as needed for Anxiety. 60 Tab 0    fluticasone (FLONASE) 50 mcg/actuation nasal spray 2 Sprays by Both Nostrils route daily. 1 Bottle 2    atenolol (TENORMIN) 50 mg tablet TAKE ONE TABLET BY MOUTH DAILY 90 Tab 1    buPROPion XL (WELLBUTRIN XL) 150 mg tablet Take 1 Tab by mouth every morning. 90 Tab 1    cetirizine (ZYRTEC) 10 mg tablet Take 1 Tab by mouth daily. 30 Tab 11    furosemide (LASIX) 20 mg tablet Take 1 Tab by mouth daily. 90 Tab 1    potassium chloride SA (MICRO-K) 10 mEq capsule TAKE ONE CAPSULE BY MOUTH TWICE A  Cap 1    calcitRIOL (ROCALTROL) 0.25 mcg capsule Take 1 Cap by mouth daily. 90 Cap 1    traZODone (DESYREL) 50 mg tablet Take 1 Tab by mouth nightly. Indications: insomnia associated with depression 30 Tab 5    albuterol (PROVENTIL HFA, VENTOLIN HFA, PROAIR HFA) 90 mcg/actuation inhaler Take 2 Puffs by inhalation every four (4) hours as needed for Wheezing. 1 Inhaler 5    albuterol (VENTOLIN HFA) 90 mcg/actuation inhaler Take 1 Puff by inhalation every six (6) hours as needed for Wheezing. 1 Inhaler 2    nitroglycerin (NITROSTAT) 0.4 mg SL tablet 1 Tab by SubLINGual route as needed for Chest Pain. 1 Bottle 3    aspirin 81 mg tablet Take 81 mg by mouth.  LORazepam (ATIVAN) 1 mg tablet Take 1 Tab by mouth every eight (8) hours as needed for Anxiety. 60 Tab 0    ibuprofen (MOTRIN) 400 mg tablet TAKE ONE TABLET BY MOUTH EVERY 6 HOURS AS NEEDED FOR PAIN 60 Tab 0    venlafaxine-SR (EFFEXOR-XR) 37.5 mg capsule Take 1 Cap by mouth daily. 90 Cap 1    triamcinolone acetonide (KENALOG) 0.1 % ointment Apply  to affected area two (2) times a day.  use thin layer 30 g 0     Family History   Problem Relation Age of Onset    No Known Problems Mother     No Known Problems Father      Social History   Substance Use Topics    Smoking status: Never Smoker    Smokeless tobacco: Never Used    Alcohol use Yes      Comment: social Specialists/Care Team   Suellen Arriaza has established care with the following healthcare providers:  Patient Care Team:  Surendra Bowen MD as PCP - General (Family Practice)  Mayda Miguel MD (Cardiology)  Colleen Bear MD (Orthopedic Surgery)    Health Risk Assessment     Demographics   female  74 y.o. General Health Questions   -During the past 4 weeks:   -how would you rate your health in general? Fair   -how often have you been bothered by feeling dizzy when standing up? occasionally and 1 times a week, pt reports since she cant see she loses her perception. -how much have you been bothered by bodily pain? Severely with rain, moderate pain every day    -Have you noticed any hearing difficulties? Pt has ringing in her ears , for more than year, constant ringing    -has your physical and emotional health limited your social activities with family or friends? Yes, pt does not want to go to strange places because she is afraid she may trip or fall. She likes to got to places she is used to so she can get around     Emotional Health Questions   -Do you have a history of depression, anxiety, or emotional problems? yes  -Over the past 2 weeks, have you felt down, depressed or hopeless? no  -Over the past 2 weeks, have you felt little interest or pleasure in doing things? yes    Health Habits   Please describe your diet habits: Appetite is good pt eats what she   Do you get 5 servings of fruits or vegetables daily? No, pt does not have appetite for fruits and veggies, she tries to stick to things that are simple to fix   Do you exercise regularly? Yes, moves around moping dusting, hangin with dogs   Alcohol Use: None    Activities of Daily Living and Functional Status   -Do you need help with eating, walking, dressing, bathing, toileting, the phone, transportation, shopping, preparing meals, housework, laundry, medications or managing money?  Yes, she takes care money, laundry, but  Shopping and transportation her male  takes her   -In the past four weeks, was someone available to help you if you needed and wanted help with anything? yes  -Are you confident are you that you can control and manage most of your health problems? yes  -Have you been given information to help you keep track of your medications? yes  -How often do you have trouble taking your medications as prescribed? never    Fall Risk and Home Safety   Have you fallen 2 or more times in the past year? no  Does your home have rugs in the hallway, lack grab bars in the bathroom, lack handrails on the stairs or have poor lighting? no and pt is blind but bathroom has bars and handrails   Do you have smoke detectors and check them regularly? yes  Do you have difficulties driving a car? Pt does not drive   Do you always fasten your seat belt when you are in a car? yes    Review of Systems (if indicated for problems addressed today)   Pt complains of worsening of arthritis j    Physical Examination     Vitals:    10/17/17 1257   BP: 143/71   Pulse: 68   Resp: 20   Temp: 96 °F (35.6 °C)   TempSrc: Oral   SpO2: 99%   Weight: 166 lb (75.3 kg)   Height: 5' 2\" (1.575 m)     Body mass index is 30.36 kg/(m^2). No exam data present  Was the patient's timed Up & Go test unsteady or longer than 30 seconds?  yes    Evaluation of Cognitive Function   Mood/affect:  neutral  Orientation: Person, Place, Time and Situation  Appearance: age appropriate and casually dressed  Family member/caregiver input: N/A    Additional exam if indicated for problems addressed today: noted below      Advice/Referrals/Counseling (as indicated)   Education and counseling provided for any problems identified above:     Preventive Services     (Preventive care checklist to be included in patient instructions)  Discussed today Done Previously Not Needed     10/18/2016  Pneumococcal vaccines   X To get done  Today via [facility]   Flu vaccine      Hepatitis B vaccine (if at risk)    8/29/2017  Shingles vaccine   X will get done today via  [employer]   TDAP vaccine   Will refer today  7/2016  Mammogram   Does not remember last pap   Pap smear   X will refer for colonoscopy today    Colorectal cancer screening     X  Low-dose CT for lung cancer screening   xWill refer today for bone DEXA   Bone density test     x Glaucoma screening    4/18/2017  Cholesterol test     x Diabetes screening test      x Diabetes self-management class     x Nutritionist referral for diabetes or renal disease     Discussion of Advance Directive   Discussed with Sheila Sumner her ability to prepare and advance directive in the case that an injury or illness causes her to be unable to make health care decisions. Pt will get her daughter to fill the form out. Assessment/Plan   V70.0    ICD-10-CM ICD-9-CM    1. Screening for breast cancer Z12.31 V76.10 JIA MAMMO BI SCREENING INCL CAD      REFERRAL FOR COLONOSCOPY      DEXA BONE DENSITY STUDY AXIAL   2. Medicare annual wellness visit, subsequent Z00.00 V70.0    3. Encounter for screening colonoscopy Z12.11 V76.51        Orders Placed This Encounter    JIA MAMMO BI SCREENING INCL CAD    DEXA BONE DENSITY STUDY AXIAL    REFERRAL FOR COLONOSCOPY       Follow-up Disposition:  Return in about 2 weeks (around 10/31/2017) for pap smear .     Chris Rothman MD

## 2017-10-17 NOTE — PATIENT INSTRUCTIONS
Pt will get a flu shot a [facility], just request when you arrive   1. You will be hearing from us about  Colonoscopy , mammogram, DEXA scan  Scheduling     2. Fill out Advance directive form     3.  Please come back for pap smear    4.follow up with Dr. Mykel Whiting for pain management

## 2017-10-18 ENCOUNTER — TELEPHONE (OUTPATIENT)
Dept: FAMILY MEDICINE CLINIC | Age: 67
End: 2017-10-18

## 2017-10-18 NOTE — TELEPHONE ENCOUNTER
[facility]. The patient is there to get her TDAP and states she was told to get her flu shot at the same time. Leelee Villavicencio stated that in the past she had been told to wait two weeks between the TDAP and the flu shot. Please advise at [phone].

## 2017-10-18 NOTE — TELEPHONE ENCOUNTER
Returned call to [facility] and advised per ros Rebolledo to have tdap and flu shot on same day.

## 2017-10-20 DIAGNOSIS — F41.8 DEPRESSION WITH ANXIETY: ICD-10-CM

## 2017-10-22 RX ORDER — TRAZODONE HYDROCHLORIDE 50 MG/1
TABLET ORAL
Qty: 30 TAB | Refills: 3 | Status: SHIPPED | OUTPATIENT
Start: 2017-10-22 | End: 2018-08-30 | Stop reason: SDUPTHER

## 2017-11-09 DIAGNOSIS — F41.8 DEPRESSION WITH ANXIETY: ICD-10-CM

## 2017-11-09 DIAGNOSIS — I10 ESSENTIAL HYPERTENSION: ICD-10-CM

## 2017-11-09 DIAGNOSIS — Z76.0 ENCOUNTER FOR MEDICATION REFILL: ICD-10-CM

## 2017-11-13 RX ORDER — ATENOLOL 50 MG/1
TABLET ORAL
Qty: 90 TAB | Refills: 0 | Status: SHIPPED | OUTPATIENT
Start: 2017-11-13 | End: 2018-02-02 | Stop reason: SDUPTHER

## 2017-11-13 RX ORDER — LORAZEPAM 1 MG/1
1 TABLET ORAL
Qty: 60 TAB | Refills: 0 | Status: SHIPPED | OUTPATIENT
Start: 2017-11-13 | End: 2017-12-18 | Stop reason: SDUPTHER

## 2017-12-04 DIAGNOSIS — J45.20 MILD INTERMITTENT ASTHMA WITHOUT COMPLICATION: ICD-10-CM

## 2017-12-05 RX ORDER — ALBUTEROL SULFATE 90 UG/1
AEROSOL, METERED RESPIRATORY (INHALATION)
Qty: 1 INHALER | Refills: 2 | Status: SHIPPED | OUTPATIENT
Start: 2017-12-05 | End: 2018-06-25 | Stop reason: CLARIF

## 2017-12-18 DIAGNOSIS — F41.8 DEPRESSION WITH ANXIETY: ICD-10-CM

## 2017-12-18 DIAGNOSIS — Z76.0 ENCOUNTER FOR MEDICATION REFILL: ICD-10-CM

## 2017-12-21 RX ORDER — LORAZEPAM 1 MG/1
1 TABLET ORAL
Qty: 60 TAB | Refills: 0 | Status: SHIPPED | OUTPATIENT
Start: 2017-12-21 | End: 2018-01-26 | Stop reason: SDUPTHER

## 2018-01-26 DIAGNOSIS — F41.8 DEPRESSION WITH ANXIETY: ICD-10-CM

## 2018-01-26 DIAGNOSIS — Z76.0 ENCOUNTER FOR MEDICATION REFILL: ICD-10-CM

## 2018-01-28 RX ORDER — LORAZEPAM 1 MG/1
1 TABLET ORAL
Qty: 60 TAB | Refills: 0 | Status: SHIPPED | OUTPATIENT
Start: 2018-01-28 | End: 2018-03-06 | Stop reason: SDUPTHER

## 2018-02-02 ENCOUNTER — OFFICE VISIT (OUTPATIENT)
Dept: FAMILY MEDICINE CLINIC | Age: 68
End: 2018-02-02

## 2018-02-02 VITALS
BODY MASS INDEX: 29.44 KG/M2 | DIASTOLIC BLOOD PRESSURE: 76 MMHG | OXYGEN SATURATION: 97 % | TEMPERATURE: 97.5 F | WEIGHT: 160 LBS | HEART RATE: 66 BPM | SYSTOLIC BLOOD PRESSURE: 139 MMHG | HEIGHT: 62 IN | RESPIRATION RATE: 18 BRPM

## 2018-02-02 DIAGNOSIS — I25.83 CORONARY ARTERY DISEASE DUE TO LIPID RICH PLAQUE: ICD-10-CM

## 2018-02-02 DIAGNOSIS — I25.10 CORONARY ARTERY DISEASE DUE TO LIPID RICH PLAQUE: ICD-10-CM

## 2018-02-02 DIAGNOSIS — I10 ESSENTIAL HYPERTENSION: ICD-10-CM

## 2018-02-02 DIAGNOSIS — M25.561 ARTHRALGIA OF RIGHT KNEE: ICD-10-CM

## 2018-02-02 DIAGNOSIS — E78.5 HYPERLIPIDEMIA, UNSPECIFIED HYPERLIPIDEMIA TYPE: Primary | ICD-10-CM

## 2018-02-02 RX ORDER — ATENOLOL 50 MG/1
TABLET ORAL
Qty: 90 TAB | Refills: 1 | Status: SHIPPED | OUTPATIENT
Start: 2018-02-02 | End: 2018-08-24 | Stop reason: SDUPTHER

## 2018-02-02 NOTE — MR AVS SNAPSHOT
Tevin Smoker 
 
 
 [address] [address] 
[phone] Patient: Yahir Magdaleno MRN: [id number] DYQ:2/82/0819 Visit Information Date & Time Provider Department Dept. Phone Encounter #  
 2/2/2018 11:00 AM Yolanda Vann MD Yolande Blake [id number] Upcoming Health Maintenance Date Due DTaP/Tdap/Td series (1 - Tdap) 4/22/1973 GLAUCOMA SCREENING Q2Y 4/22/2017 OSTEOPOROSIS SCREENING (DEXA) 4/22/2017 Pneumococcal 65+ Low/Medium Risk (1 of 2 - PCV13) 10/18/2017 BREAST CANCER SCRN MAMMOGRAM 5/20/2018 FOBT Q 1 YEAR AGE 50-75 7/6/2028* MEDICARE YEARLY EXAM 10/18/2018 *Topic was postponed. The date shown is not the original due date. Allergies as of 2/2/2018  Review Complete On: 2/2/2018 By: Yolanda Vann MD  
  
 Severity Noted Reaction Type Reactions Percocet [Oxycodone-acetaminophen]  03/29/2012    Itching Current Immunizations  Reviewed on 10/24/2017 Name Date Influenza High Dose Vaccine PF 10/18/2017 Influenza Vaccine 10/16/2013 Influenza Vaccine Split 10/4/2012 Pneumococcal Polysaccharide (PPSV-23) 10/18/2016 Zoster Vaccine, Live 8/29/2017 Not reviewed this visit You Were Diagnosed With   
  
 Codes Comments Hyperlipidemia, unspecified hyperlipidemia type    -  Primary ICD-10-CM: E78.5 ICD-9-CM: 272.4 Essential hypertension     ICD-10-CM: I10 
ICD-9-CM: 401.9 Coronary artery disease due to lipid rich plaque     ICD-10-CM: I25.10, I25.83 ICD-9-CM: 414.00, 414.3 Arthralgia of right knee     ICD-10-CM: M25.561 ICD-9-CM: 719.46 Vitals BP Pulse Temp Resp Height(growth percentile) Weight(growth percentile) 139/76 (BP 1 Location: Right arm, BP Patient Position: Sitting) 66 97.5 °F (36.4 °C) (Oral) 18 5' 2\" (1.575 m) 160 lb (72.6 kg) SpO2 BMI OB Status Smoking Status 97% 29.26 kg/m2 Postmenopausal Never Smoker Vitals History BMI and BSA Data Body Mass Index Body Surface Area  
 29.26 kg/m 2 1.78 m 2 Preferred Pharmacy Pharmacy Name Phone [address] [place], [place] - [address]  [phone] Your Updated Medication List  
  
   
This list is accurate as of: 2/2/18 11:46 AM.  Always use your most recent med list.  
  
  
  
  
 acetaminophen 650 mg Tber Commonly known as:  TYLENOL ARTHRITIS PAIN Take 1 Tab by mouth every six (6) hours as needed for Pain. * albuterol 90 mcg/actuation inhaler Commonly known as:  VENTOLIN HFA Take 1 Puff by inhalation every six (6) hours as needed for Wheezing. * VENTOLIN HFA 90 mcg/actuation inhaler Generic drug:  albuterol INHALE 2 PUFFS BY INHALATION EVERY 4 HOURS AS NEEDED FOR WHEEZING  
  
 aspirin 81 mg tablet Take 81 mg by mouth. atenolol 50 mg tablet Commonly known as:  TENORMIN  
TAKE ONE TABLET BY MOUTH DAILY  
  
 buPROPion  mg tablet Commonly known as:  Loralyn Fujita Take 1 Tab by mouth every morning. calcitRIOL 0.25 mcg capsule Commonly known as:  ROCALTROL Take 1 Cap by mouth daily. cetirizine 10 mg tablet Commonly known as:  ZYRTEC Take 1 Tab by mouth daily. clopidogrel 75 mg Tab Commonly known as:  PLAVIX Take 1 Tab by mouth daily. cyanocobalamin 1,000 mcg tablet Take 1 Tab by mouth daily. fluticasone 50 mcg/actuation nasal spray Commonly known as:  FLONASE  
USE 2 SPRAYS IN BOTH NOSTRILS DAILY  
  
 folic acid 1 mg tablet Commonly known as:  Google Take 1 Tab by mouth daily. furosemide 20 mg tablet Commonly known as:  LASIX Take 1 Tab by mouth daily. ibuprofen 400 mg tablet Commonly known as:  MOTRIN  
TAKE ONE TABLET BY MOUTH EVERY 6 HOURS AS NEEDED FOR PAIN  
  
 lidocaine 4 % topical cream  
Commonly known as:  XYLOCAINE  
 Apply  to affected area four (4) times daily as needed for Pain. LORazepam 1 mg tablet Commonly known as:  ATIVAN Take 1 Tab by mouth every eight (8) hours as needed for Anxiety. nitroglycerin 0.4 mg SL tablet Commonly known as:  NITROSTAT  
1 Tab by SubLINGual route as needed for Chest Pain.  
  
 potassium chloride SA 10 mEq capsule Commonly known as:  MICRO-K  
TAKE ONE CAPSULE BY MOUTH TWICE A DAY promethazine 25 mg tablet Commonly known as:  PHENERGAN Take 1 Tab by mouth every six (6) hours as needed for Nausea. raNITIdine 150 mg tablet Commonly known as:  ZANTAC Take 1 Tab by mouth nightly. simvastatin 40 mg tablet Commonly known as:  ZOCOR  
TAKE ONE TABLET BY MOUTH NIGHTLY  
  
 traZODone 50 mg tablet Commonly known as:  DESYREL  
TAKE ONE TABLET BY MOUTH NIGHTLY  
  
 triamcinolone acetonide 0.1 % ointment Commonly known as:  KENALOG Apply  to affected area two (2) times a day. use thin layer  
  
 venlafaxine-SR 37.5 mg capsule Commonly known as:  EFFEXOR-XR Take 1 Cap by mouth daily. * Notice: This list has 2 medication(s) that are the same as other medications prescribed for you. Read the directions carefully, and ask your doctor or other care provider to review them with you. Prescriptions Sent to Pharmacy Refills  
 atenolol (TENORMIN) 50 mg tablet 1 Sig: TAKE ONE TABLET BY MOUTH DAILY Class: Normal  
 Pharmacy: [facility] [place] #: [phone] We Performed the Following CBC W/O DIFF [92803 CPT(R)] LIPID PANEL [10790 CPT(R)] MAGNESIUM [id number] CPT(R)] METABOLIC PANEL, COMPREHENSIVE [99851 CPT(R)] TSH 3RD GENERATION [12577 CPT(R)] URIC ACID [id number] CPT(R)] Patient Instructions DASH Diet: Care Instructions Your Care Instructions The DASH diet is an eating plan that can help lower your blood pressure. DASH stands for Dietary Approaches to Stop Hypertension. Hypertension is high blood pressure. The DASH diet focuses on eating foods that are high in calcium, potassium, and magnesium. These nutrients can lower blood pressure. The foods that are highest in these nutrients are fruits, vegetables, low-fat dairy products, nuts, seeds, and legumes. But taking calcium, potassium, and magnesium supplements instead of eating foods that are high in those nutrients does not have the same effect. The DASH diet also includes whole grains, fish, and poultry. The DASH diet is one of several lifestyle changes your doctor may recommend to lower your high blood pressure. Your doctor may also want you to decrease the amount of sodium in your diet. Lowering sodium while following the DASH diet can lower blood pressure even further than just the DASH diet alone. Follow-up care is a key part of your treatment and safety. Be sure to make and go to all appointments, and call your doctor if you are having problems. It's also a good idea to know your test results and keep a list of the medicines you take. How can you care for yourself at home? Following the DASH diet · Eat 4 to 5 servings of fruit each day. A serving is 1 medium-sized piece of fruit, ½ cup chopped or canned fruit, 1/4 cup dried fruit, or 4 ounces (½ cup) of fruit juice. Choose fruit more often than fruit juice. · Eat 4 to 5 servings of vegetables each day. A serving is 1 cup of lettuce or raw leafy vegetables, ½ cup of chopped or cooked vegetables, or 4 ounces (½ cup) of vegetable juice. Choose vegetables more often than vegetable juice. · Get 2 to 3 servings of low-fat and fat-free dairy each day. A serving is 8 ounces of milk, 1 cup of yogurt, or 1 ½ ounces of cheese. · Eat 6 to 8 servings of grains each day.  A serving is 1 slice of bread, 1 ounce of dry cereal, or ½ cup of cooked rice, pasta, or cooked cereal. Try to choose whole-grain products as much as possible. · Limit lean meat, poultry, and fish to 2 servings each day. A serving is 3 ounces, about the size of a deck of cards. · Eat 4 to 5 servings of nuts, seeds, and legumes (cooked dried beans, lentils, and split peas) each week. A serving is 1/3 cup of nuts, 2 tablespoons of seeds, or ½ cup of cooked beans or peas. · Limit fats and oils to 2 to 3 servings each day. A serving is 1 teaspoon of vegetable oil or 2 tablespoons of salad dressing. · Limit sweets and added sugars to 5 servings or less a week. A serving is 1 tablespoon jelly or jam, ½ cup sorbet, or 1 cup of lemonade. · Eat less than 2,300 milligrams (mg) of sodium a day. If you limit your sodium to 1,500 mg a day, you can lower your blood pressure even more. Tips for success · Start small. Do not try to make dramatic changes to your diet all at once. You might feel that you are missing out on your favorite foods and then be more likely to not follow the plan. Make small changes, and stick with them. Once those changes become habit, add a few more changes. · Try some of the following: ¨ Make it a goal to eat a fruit or vegetable at every meal and at snacks. This will make it easy to get the recommended amount of fruits and vegetables each day. ¨ Try yogurt topped with fruit and nuts for a snack or healthy dessert. ¨ Add lettuce, tomato, cucumber, and onion to sandwiches. ¨ Combine a ready-made pizza crust with low-fat mozzarella cheese and lots of vegetable toppings. Try using tomatoes, squash, spinach, broccoli, carrots, cauliflower, and onions. ¨ Have a variety of cut-up vegetables with a low-fat dip as an appetizer instead of chips and dip. ¨ Sprinkle sunflower seeds or chopped almonds over salads. Or try adding chopped walnuts or almonds to cooked vegetables. ¨ Try some vegetarian meals using beans and peas.  Add garbanzo or kidney beans to salads. Make burritos and tacos with mashed nolan beans or black beans. Where can you learn more? Go to http://whitney-gage.info/. Enter M165 in the search box to learn more about \"DASH Diet: Care Instructions. \" Current as of: September 21, 2016 Content Version: 11.4 © 7600-7136 [employer]. Care instructions adapted under license by [employer] (which disclaims liability or warranty for this information). If you have questions about a medical condition or this instruction, always ask your healthcare professional. [place] [address] any warranty or liability for your use of this information. Introducing [facility]! Dear Celina Hawkins: Thank you for requesting a Mapplas account. Our records indicate that you have previously registered for a Mapplas account but its currently inactive. Please call our Mapplas support line at [phone]. Additional Information If you have questions, please visit the Frequently Asked Questions section of the Mapplas website at [email or web address] Remember, Mapplas is NOT to be used for urgent needs. For medical emergencies, dial 911. Now available from your iPhone and Android! Please provide this summary of care documentation to your next provider. Your primary care clinician is listed as Eguene Gray. If you have any questions after today's visit, please call [phone].

## 2018-02-02 NOTE — PATIENT INSTRUCTIONS

## 2018-02-02 NOTE — PROGRESS NOTES
Chief Complaint   Patient presents with    Hypertension    GERD    Hand Pain    Knee Pain     right knee swelling/pain at night      she is a 74y.o. year old female who presents for follow up. Patient with hx of CAD, HTN, HLD, B12 deficiency, blindness, anxiety and depression. Patient denies HA, dizziness, SOB, CP, abdominal pain, dysuria. She complains of right knee edema and pain. Hypertension:  The patient reports: Has run out of medication, no medication side effects noted, no TIA's, no chest pain on exertion, no dyspnea on exertion, no swelling of ankles. Lifestyle modification/social history: sedentary     BP Readings from Last 3 Encounters:   02/02/18 139/76   10/17/17 143/71   07/17/17 144/74       Patient advised to log blood pressures at home 3-5 times monthly and bring to next visit. Call office as soon as possible if BP's over 140/90 on multiple occasions or with symptoms of dizziness, chest pain, shortness of breath, headache or ankle swelling. Our goal is to normalize the blood pressure to decrease the risks of strokes and heart attacks. The patient is in agreement with the plan.     Hyperlipidemia    Cardiovascular risks for her are: HTN, CAD    Our goal is to lower hyperlipidemia to decrease the risks of strokes and heart attacks      Patient Active Problem List   Diagnosis Code    Anxiety F41.9    Hyperlipidemia E78.5    CAD (coronary artery disease) I25.10    Blindness H54.7    HTN (hypertension) I10    Osteopenia M85.80    B12 deficiency E53.8    Insomnia G47.00    Allergic rhinitis J30.9    Gastroesophageal reflux disease without esophagitis K21.9    Moderate episode of recurrent major depressive disorder ([facility] [address] F33.1     Past Surgical History:   Procedure Laterality Date    CABG, VEIN, FIVE  2006    CARDIAC SURG PROCEDURE UNLIST      HX ORTHOPAEDIC       Social History     Social History    Marital status:      Spouse name: N/A    Number of children: N/A    Years of education: N/A     Occupational History    Not on file. Social History Main Topics    Smoking status: Never Smoker    Smokeless tobacco: Never Used    Alcohol use Yes      Comment: social    Drug use: No    Sexual activity: Yes     Partners: Male     Birth control/ protection: None     Other Topics Concern    Not on file     Social History Narrative     Family History   Problem Relation Age of Onset    No Known Problems Mother     No Known Problems Father      Current Outpatient Prescriptions   Medication Sig    atenolol (TENORMIN) 50 mg tablet TAKE ONE TABLET BY MOUTH DAILY    fluticasone (FLONASE) 50 mcg/actuation nasal spray USE 2 SPRAYS IN BOTH NOSTRILS DAILY    LORazepam (ATIVAN) 1 mg tablet Take 1 Tab by mouth every eight (8) hours as needed for Anxiety.  VENTOLIN HFA 90 mcg/actuation inhaler INHALE 2 PUFFS BY INHALATION EVERY 4 HOURS AS NEEDED FOR WHEEZING    traZODone (DESYREL) 50 mg tablet TAKE ONE TABLET BY MOUTH NIGHTLY    lidocaine (XYLOCAINE) 4 % topical cream Apply  to affected area four (4) times daily as needed for Pain.  acetaminophen (TYLENOL ARTHRITIS PAIN) 650 mg CR tablet Take 1 Tab by mouth every six (6) hours as needed for Pain.  raNITIdine (ZANTAC) 150 mg tablet Take 1 Tab by mouth nightly.  clopidogrel (PLAVIX) 75 mg tab Take 1 Tab by mouth daily.  simvastatin (ZOCOR) 40 mg tablet TAKE ONE TABLET BY MOUTH NIGHTLY    promethazine (PHENERGAN) 25 mg tablet Take 1 Tab by mouth every six (6) hours as needed for Nausea.  folic acid (FOLVITE) 1 mg tablet Take 1 Tab by mouth daily.  cyanocobalamin 1,000 mcg tablet Take 1 Tab by mouth daily.  ibuprofen (MOTRIN) 400 mg tablet TAKE ONE TABLET BY MOUTH EVERY 6 HOURS AS NEEDED FOR PAIN    buPROPion XL (WELLBUTRIN XL) 150 mg tablet Take 1 Tab by mouth every morning.  cetirizine (ZYRTEC) 10 mg tablet Take 1 Tab by mouth daily.  venlafaxine-SR (EFFEXOR-XR) 37.5 mg capsule Take 1 Cap by mouth daily.  furosemide (LASIX) 20 mg tablet Take 1 Tab by mouth daily.  potassium chloride SA (MICRO-K) 10 mEq capsule TAKE ONE CAPSULE BY MOUTH TWICE A DAY    calcitRIOL (ROCALTROL) 0.25 mcg capsule Take 1 Cap by mouth daily.  albuterol (VENTOLIN HFA) 90 mcg/actuation inhaler Take 1 Puff by inhalation every six (6) hours as needed for Wheezing.  triamcinolone acetonide (KENALOG) 0.1 % ointment Apply  to affected area two (2) times a day. use thin layer    nitroglycerin (NITROSTAT) 0.4 mg SL tablet 1 Tab by SubLINGual route as needed for Chest Pain.  aspirin 81 mg tablet Take 81 mg by mouth. No current facility-administered medications for this visit. Allergies   Allergen Reactions    Percocet [Oxycodone-Acetaminophen] Itching       Review of Systems:  Constitutional: feeling well, denies fatigue, malaise  Skin: Negative for rash or lesion  HEENT: see HPI, Negative for acute hearing changes  Respiratory: Negative for cough, wheezing or SOB  Cardiovascular: Negative for chest pain, dizziness or palpitations  Gastrointestinal: Negative for nausea or abdominal pain  Genital/urinary: Negative for dysuria or voiding dysfunction  Musculoskeletal: c/o right knee arthralgia   Neurological: Negative for HA, weakness or paresthesia  Psychlogical: Negative for depression or anxiety     Vitals:    02/02/18 1059   BP: 139/76   Pulse: 66   Resp: 18   Temp: 97.5 °F (36.4 °C)   TempSrc: Oral   SpO2: 97%   Weight: 160 lb (72.6 kg)   Height: 5' 2\" (1.575 m)       Physical Examination:  General: Well developed, well nourished, in no acute distress  Skin: Warm and dry, no rash or lesion appreciated  Head: Normocephalic, atraumatic  Neck: Normal range of motion  Respiratory: Clear to auscultation bilaterally with symmetrical effort  Cardiovascular: Normal S1, S2, Regular rate and rhythm  Extremities: Full range of motion, Normal gait  Neurological: Normal strength and sensation.  No focal deficits  Psychological: Active, alert and oriented. Affect appropriate       Diagnoses and all orders for this visit:    1. Hyperlipidemia, unspecified hyperlipidemia type  -     LIPID PANEL  -     METABOLIC PANEL, COMPREHENSIVE    2. Essential hypertension  -     atenolol (TENORMIN) 50 mg tablet; TAKE ONE TABLET BY MOUTH DAILY  -     MAGNESIUM  -     METABOLIC PANEL, COMPREHENSIVE  -     TSH 3RD GENERATION    3. Coronary artery disease due to lipid rich plaque  -     atenolol (TENORMIN) 50 mg tablet; TAKE ONE TABLET BY MOUTH DAILY  -     LIPID PANEL  -     METABOLIC PANEL, COMPREHENSIVE  -     CBC W/O DIFF    4. Arthralgia of right knee  -     URIC ACID       Importance of compliance with all prescribed medications discussed. Continue current prescribed medications as written. I have discussed the diagnosis with the patient and the intended plan as seen in the above orders. The patient expresses understanding and agreement with our plan of care. All of the patient's questions were answered to apparent satisfaction. The patient has received an after-visit summary. The patient knows to call our office if there are any questions or concerns regarding diagnosis and treatment plans. I have discussed medication side effects and warnings with the patient as well. Over half of the 25 minutes face to face with Marie Wells consisted of counseling and discussing treatment plans in reference to her multiple co-morbidities. Follow-up Disposition:  Return in about 6 months (around 8/2/2018), or if symptoms worsen or fail to improve.

## 2018-02-03 LAB
ALBUMIN SERPL-MCNC: 4.4 G/DL (ref 3.6–4.8)
ALBUMIN/GLOB SERPL: 1.6 {RATIO} (ref 1.2–2.2)
ALP SERPL-CCNC: 79 IU/L (ref 39–117)
ALT SERPL-CCNC: 11 IU/L (ref 0–32)
AST SERPL-CCNC: 18 IU/L (ref 0–40)
BILIRUB SERPL-MCNC: 0.4 MG/DL (ref 0–1.2)
BUN SERPL-MCNC: 13 MG/DL (ref 8–27)
BUN/CREAT SERPL: 13 (ref 12–28)
CALCIUM SERPL-MCNC: 9.6 MG/DL (ref 8.7–10.3)
CHLORIDE SERPL-SCNC: 104 MMOL/L (ref 96–106)
CHOLEST SERPL-MCNC: 137 MG/DL (ref 100–199)
CO2 SERPL-SCNC: 24 MMOL/L (ref 18–29)
CREAT SERPL-MCNC: 0.99 MG/DL (ref 0.57–1)
ERYTHROCYTE [DISTWIDTH] IN BLOOD BY AUTOMATED COUNT: 12.3 % (ref 12.3–15.4)
GFR SERPLBLD CREATININE-BSD FMLA CKD-EPI: 60 ML/MIN/1.73
GFR SERPLBLD CREATININE-BSD FMLA CKD-EPI: 69 ML/MIN/1.73
GLOBULIN SER CALC-MCNC: 2.7 G/DL (ref 1.5–4.5)
GLUCOSE SERPL-MCNC: 88 MG/DL (ref 65–99)
HCT VFR BLD AUTO: 39.2 % (ref 34–46.6)
HDLC SERPL-MCNC: 45 MG/DL
HGB BLD-MCNC: 13.7 G/DL (ref 11.1–15.9)
LDLC SERPL CALC-MCNC: 73 MG/DL (ref 0–99)
MAGNESIUM SERPL-MCNC: 2.2 MG/DL (ref 1.6–2.3)
MCH RBC QN AUTO: 32.7 PG (ref 26.6–33)
MCHC RBC AUTO-ENTMCNC: 34.9 G/DL (ref 31.5–35.7)
MCV RBC AUTO: 94 FL (ref 79–97)
PLATELET # BLD AUTO: 194 X10E3/UL (ref 150–379)
POTASSIUM SERPL-SCNC: 4.5 MMOL/L (ref 3.5–5.2)
PROT SERPL-MCNC: 7.1 G/DL (ref 6–8.5)
RBC # BLD AUTO: 4.19 X10E6/UL (ref 3.77–5.28)
SODIUM SERPL-SCNC: 143 MMOL/L (ref 134–144)
TRIGL SERPL-MCNC: 96 MG/DL (ref 0–149)
TSH SERPL DL<=0.005 MIU/L-ACNC: 1.34 UIU/ML (ref 0.45–4.5)
URATE SERPL-MCNC: 6.5 MG/DL (ref 2.5–7.1)
VLDLC SERPL CALC-MCNC: 19 MG/DL (ref 5–40)
WBC # BLD AUTO: 5.3 X10E3/UL (ref 3.4–10.8)

## 2018-02-06 ENCOUNTER — TELEPHONE (OUTPATIENT)
Dept: FAMILY MEDICINE CLINIC | Age: 68
End: 2018-02-06

## 2018-02-06 NOTE — TELEPHONE ENCOUNTER
Per Dr. Parmjit Alvarado,   \"meloxicam is a long acting NSAID and should not be taken with Plavix\"  Spoke with patient and advised.

## 2018-02-06 NOTE — TELEPHONE ENCOUNTER
----- Message from Sukh Bowen sent at 2/6/2018 11:28 AM EST -----  Regarding: Dr. Whitmore Gu Telephone  Patient would like to know if \"Meloxicam\"  would be a good Rx for her take for pain and arthritis.   Please call her back at [phone]

## 2018-03-06 DIAGNOSIS — Z76.0 ENCOUNTER FOR MEDICATION REFILL: ICD-10-CM

## 2018-03-06 DIAGNOSIS — I25.10 CORONARY ARTERY DISEASE DUE TO LIPID RICH PLAQUE: ICD-10-CM

## 2018-03-06 DIAGNOSIS — F41.8 DEPRESSION WITH ANXIETY: ICD-10-CM

## 2018-03-06 DIAGNOSIS — I25.83 CORONARY ARTERY DISEASE DUE TO LIPID RICH PLAQUE: ICD-10-CM

## 2018-03-06 DIAGNOSIS — K21.9 GASTROESOPHAGEAL REFLUX DISEASE WITHOUT ESOPHAGITIS: ICD-10-CM

## 2018-03-06 DIAGNOSIS — E78.5 HYPERLIPIDEMIA, UNSPECIFIED HYPERLIPIDEMIA TYPE: ICD-10-CM

## 2018-03-06 RX ORDER — SIMVASTATIN 40 MG/1
TABLET, FILM COATED ORAL
Qty: 90 TAB | Refills: 0 | Status: SHIPPED | OUTPATIENT
Start: 2018-03-06 | End: 2018-09-10 | Stop reason: SDUPTHER

## 2018-03-06 RX ORDER — RANITIDINE 150 MG/1
TABLET, FILM COATED ORAL
Qty: 90 TAB | Refills: 0 | Status: SHIPPED | OUTPATIENT
Start: 2018-03-06 | End: 2018-04-05 | Stop reason: SDUPTHER

## 2018-03-08 RX ORDER — LANOLIN ALCOHOL/MO/W.PET/CERES
1000 CREAM (GRAM) TOPICAL DAILY
Qty: 90 TAB | Refills: 1 | Status: SHIPPED | OUTPATIENT
Start: 2018-03-08 | End: 2019-08-13 | Stop reason: SDUPTHER

## 2018-03-08 RX ORDER — LORAZEPAM 1 MG/1
1 TABLET ORAL
Qty: 60 TAB | Refills: 0 | Status: SHIPPED | OUTPATIENT
Start: 2018-03-08 | End: 2018-04-05 | Stop reason: SDUPTHER

## 2018-03-08 RX ORDER — NITROGLYCERIN 0.4 MG/1
0.4 TABLET SUBLINGUAL AS NEEDED
Qty: 1 BOTTLE | Refills: 3 | Status: SHIPPED | OUTPATIENT
Start: 2018-03-08 | End: 2021-09-16 | Stop reason: SDUPTHER

## 2018-03-19 DIAGNOSIS — F41.8 DEPRESSION WITH ANXIETY: ICD-10-CM

## 2018-03-20 RX ORDER — BUPROPION HYDROCHLORIDE 150 MG/1
TABLET ORAL
Qty: 90 TAB | Refills: 0 | Status: SHIPPED | OUTPATIENT
Start: 2018-03-20 | End: 2018-08-30 | Stop reason: SDUPTHER

## 2018-04-05 DIAGNOSIS — F41.8 DEPRESSION WITH ANXIETY: ICD-10-CM

## 2018-04-05 DIAGNOSIS — Z76.0 ENCOUNTER FOR MEDICATION REFILL: ICD-10-CM

## 2018-04-05 RX ORDER — LORAZEPAM 1 MG/1
1 TABLET ORAL
Qty: 60 TAB | Refills: 0 | Status: SHIPPED | OUTPATIENT
Start: 2018-04-05 | End: 2018-05-09 | Stop reason: SDUPTHER

## 2018-05-09 DIAGNOSIS — Z76.0 ENCOUNTER FOR MEDICATION REFILL: ICD-10-CM

## 2018-05-09 DIAGNOSIS — F41.8 DEPRESSION WITH ANXIETY: ICD-10-CM

## 2018-05-11 RX ORDER — LORAZEPAM 1 MG/1
1 TABLET ORAL
Qty: 60 TAB | Refills: 0 | Status: SHIPPED | OUTPATIENT
Start: 2018-05-11 | End: 2018-06-08 | Stop reason: SDUPTHER

## 2018-06-25 ENCOUNTER — TELEPHONE (OUTPATIENT)
Dept: FAMILY MEDICINE CLINIC | Age: 68
End: 2018-06-25

## 2018-06-25 RX ORDER — ALBUTEROL SULFATE 90 UG/1
2 AEROSOL, METERED RESPIRATORY (INHALATION)
Qty: 1 INHALER | Refills: 3 | Status: SHIPPED | OUTPATIENT
Start: 2018-06-25 | End: 2020-04-13 | Stop reason: SDUPTHER

## 2018-07-11 DIAGNOSIS — I25.10 CORONARY ARTERY DISEASE DUE TO LIPID RICH PLAQUE: ICD-10-CM

## 2018-07-11 DIAGNOSIS — I25.83 CORONARY ARTERY DISEASE DUE TO LIPID RICH PLAQUE: ICD-10-CM

## 2018-07-12 RX ORDER — CLOPIDOGREL BISULFATE 75 MG/1
TABLET ORAL
Qty: 90 TAB | Refills: 0 | Status: SHIPPED | OUTPATIENT
Start: 2018-07-12 | End: 2019-01-22 | Stop reason: SDUPTHER

## 2018-07-16 ENCOUNTER — TELEPHONE (OUTPATIENT)
Dept: FAMILY MEDICINE CLINIC | Age: 68
End: 2018-07-16

## 2018-07-16 DIAGNOSIS — F41.8 DEPRESSION WITH ANXIETY: ICD-10-CM

## 2018-07-16 DIAGNOSIS — Z76.0 ENCOUNTER FOR MEDICATION REFILL: ICD-10-CM

## 2018-07-16 RX ORDER — LORAZEPAM 1 MG/1
1 TABLET ORAL
Qty: 60 TAB | Refills: 0 | Status: SHIPPED | OUTPATIENT
Start: 2018-07-16 | End: 2018-08-17 | Stop reason: SDUPTHER

## 2018-07-16 NOTE — TELEPHONE ENCOUNTER
Refill request received from patient. She said she ran out of her Lorazepam on Saturday. Last office visit was 2/2/18. [facility] was to have faxed it. Please advise at [phone]. She wants this refilled today.

## 2018-08-17 DIAGNOSIS — F41.8 DEPRESSION WITH ANXIETY: ICD-10-CM

## 2018-08-17 DIAGNOSIS — Z76.0 ENCOUNTER FOR MEDICATION REFILL: ICD-10-CM

## 2018-08-17 NOTE — TELEPHONE ENCOUNTER
Patient called back and said she only has 2 pills left. Informed patient that Dr. Claudia Fermin is out of the office until Monday.

## 2018-08-20 RX ORDER — LORAZEPAM 1 MG/1
1 TABLET ORAL
Qty: 60 TAB | Refills: 0 | Status: SHIPPED | OUTPATIENT
Start: 2018-08-20 | End: 2018-10-25 | Stop reason: SDUPTHER

## 2018-08-30 ENCOUNTER — OFFICE VISIT (OUTPATIENT)
Dept: FAMILY MEDICINE CLINIC | Age: 68
End: 2018-08-30

## 2018-08-30 VITALS
SYSTOLIC BLOOD PRESSURE: 144 MMHG | RESPIRATION RATE: 20 BRPM | WEIGHT: 163.8 LBS | HEIGHT: 62 IN | OXYGEN SATURATION: 98 % | HEART RATE: 72 BPM | TEMPERATURE: 98.3 F | BODY MASS INDEX: 30.14 KG/M2 | DIASTOLIC BLOOD PRESSURE: 80 MMHG

## 2018-08-30 DIAGNOSIS — I10 ESSENTIAL HYPERTENSION: Primary | ICD-10-CM

## 2018-08-30 DIAGNOSIS — F41.8 DEPRESSION WITH ANXIETY: ICD-10-CM

## 2018-08-30 DIAGNOSIS — I25.83 CORONARY ARTERY DISEASE DUE TO LIPID RICH PLAQUE: ICD-10-CM

## 2018-08-30 DIAGNOSIS — H54.7 BLINDNESS: ICD-10-CM

## 2018-08-30 DIAGNOSIS — I25.10 CORONARY ARTERY DISEASE DUE TO LIPID RICH PLAQUE: ICD-10-CM

## 2018-08-30 RX ORDER — BUPROPION HYDROCHLORIDE 150 MG/1
TABLET ORAL
Qty: 90 TAB | Refills: 1 | Status: SHIPPED | OUTPATIENT
Start: 2018-08-30 | End: 2019-02-28 | Stop reason: SDUPTHER

## 2018-08-30 RX ORDER — VENLAFAXINE HYDROCHLORIDE 37.5 MG/1
37.5 CAPSULE, EXTENDED RELEASE ORAL DAILY
Qty: 90 CAP | Refills: 1 | Status: SHIPPED | OUTPATIENT
Start: 2018-08-30 | End: 2018-11-01 | Stop reason: HOSPADM

## 2018-08-30 RX ORDER — TRAZODONE HYDROCHLORIDE 50 MG/1
TABLET ORAL
Qty: 30 TAB | Refills: 3 | Status: SHIPPED | OUTPATIENT
Start: 2018-08-30 | End: 2019-08-13

## 2018-08-30 NOTE — PROGRESS NOTES
Chief Complaint   Patient presents with    Medication Refill    Anxiety    Request For New Medication    Leg Pain     she is a 79y.o. year old female who presents for follow up of her chronic medical conditions. Patient with hx of CAD, HTN, HLD, B12 deficiency, blindness, anxiety and depression. Patient denies HA, dizziness, SOB, CP, abdominal pain, dysuria. She complains of increased anxiety as she has moved into a new home. Hypertension:  The patient reports: Has run out of medication, no medication side effects noted, no TIA's, no chest pain on exertion, no dyspnea on exertion, no swelling of ankles. Lifestyle modification/social history: sedentary     BP Readings from Last 3 Encounters:   08/30/18 144/80   02/02/18 139/76   10/17/17 143/71       Patient advised to log blood pressures at home 3-5 times monthly and bring to next visit. Call office as soon as possible if BP's over 140/90 on multiple occasions or with symptoms of dizziness, chest pain, shortness of breath, headache or ankle swelling. Our goal is to normalize the blood pressure to decrease the risks of strokes and heart attacks. The patient is in agreement with the plan.     Hyperlipidemia    Cardiovascular risks for her are: HTN, CAD    Our goal is to lower hyperlipidemia to decrease the risks of strokes and heart attacks      Patient Active Problem List   Diagnosis Code    Anxiety F41.9    Hyperlipidemia E78.5    CAD (coronary artery disease) I25.10    Blindness H54.7    HTN (hypertension) I10    Osteopenia M85.80    B12 deficiency E53.8    Insomnia G47.00    Allergic rhinitis J30.9    Gastroesophageal reflux disease without esophagitis K21.9    Moderate episode of recurrent major depressive disorder ([facility] [address] F33.1     Past Surgical History:   Procedure Laterality Date    CABG, VEIN, FIVE  2006    CARDIAC SURG PROCEDURE UNLIST      HX ORTHOPAEDIC       Social History     Social History    Marital status:  Spouse name: N/A    Number of children: N/A    Years of education: N/A     Occupational History    Not on file. Social History Main Topics    Smoking status: Never Smoker    Smokeless tobacco: Never Used    Alcohol use Yes      Comment: social    Drug use: No    Sexual activity: Yes     Partners: Male     Birth control/ protection: None     Other Topics Concern    Not on file     Social History Narrative     Family History   Problem Relation Age of Onset    No Known Problems Mother     No Known Problems Father      Current Outpatient Prescriptions   Medication Sig    traZODone (DESYREL) 50 mg tablet TAKE ONE TABLET BY MOUTH NIGHTLY  Indications: insomnia associated with depression    buPROPion XL (WELLBUTRIN XL) 150 mg tablet TAKE ONE TABLET BY MOUTH EVERY MORNING    venlafaxine-SR (EFFEXOR-XR) 37.5 mg capsule Take 1 Cap by mouth daily.  atenolol (TENORMIN) 50 mg tablet TAKE ONE TABLET BY MOUTH DAILY    furosemide (LASIX) 20 mg tablet TAKE ONE TABLET BY MOUTH EVERY DAY    LORazepam (ATIVAN) 1 mg tablet Take 1 Tab by mouth every eight (8) hours as needed for Anxiety.  clopidogrel (PLAVIX) 75 mg tab TAKE 1 TABLET BY MOUTH DAILY    albuterol (PROVENTIL HFA, VENTOLIN HFA, PROAIR HFA) 90 mcg/actuation inhaler Take 2 Puffs by inhalation every four (4) hours as needed for Wheezing.  potassium chloride SA (MICRO-K) 10 mEq capsule TAKE ONE CAPSULE BY MOUTH TWICE A DAY    nitroglycerin (NITROSTAT) 0.4 mg SL tablet 1 Tab by SubLINGual route as needed for Chest Pain.  cyanocobalamin 1,000 mcg tablet Take 1 Tab by mouth daily.  simvastatin (ZOCOR) 40 mg tablet TAKE ONE TABLET BY MOUTH NIGHTLY    fluticasone (FLONASE) 50 mcg/actuation nasal spray USE 2 SPRAYS IN BOTH NOSTRILS DAILY    lidocaine (XYLOCAINE) 4 % topical cream Apply  to affected area four (4) times daily as needed for Pain.     acetaminophen (TYLENOL ARTHRITIS PAIN) 650 mg CR tablet Take 1 Tab by mouth every six (6) hours as needed for Pain.  raNITIdine (ZANTAC) 150 mg tablet Take 1 Tab by mouth nightly.  promethazine (PHENERGAN) 25 mg tablet Take 1 Tab by mouth every six (6) hours as needed for Nausea.  folic acid (FOLVITE) 1 mg tablet Take 1 Tab by mouth daily.  ibuprofen (MOTRIN) 400 mg tablet TAKE ONE TABLET BY MOUTH EVERY 6 HOURS AS NEEDED FOR PAIN    cetirizine (ZYRTEC) 10 mg tablet Take 1 Tab by mouth daily.  calcitRIOL (ROCALTROL) 0.25 mcg capsule Take 1 Cap by mouth daily.  triamcinolone acetonide (KENALOG) 0.1 % ointment Apply  to affected area two (2) times a day. use thin layer    aspirin 81 mg tablet Take 81 mg by mouth. No current facility-administered medications for this visit.       Allergies   Allergen Reactions    Percocet [Oxycodone-Acetaminophen] Itching       Review of Systems:  Constitutional: feeling well, denies fatigue, malaise  Skin: Negative for rash or lesion  HEENT: see HPI, Negative for acute hearing changes  Respiratory: Negative for cough, wheezing or SOB  Cardiovascular: Negative for chest pain, dizziness or palpitations  Gastrointestinal: Negative for nausea or abdominal pain  Genital/urinary: Negative for dysuria or voiding dysfunction  Musculoskeletal: c/o right knee arthralgia   Neurological: Negative for HA, weakness or paresthesia  Psychlogical: Negative for depression or anxiety     Vitals:    08/30/18 1122   BP: 144/80   Pulse: 72   Resp: 20   Temp: 98.3 °F (36.8 °C)   TempSrc: Oral   SpO2: 98%   Weight: 163 lb 12.8 oz (74.3 kg)   Height: 5' 2\" (1.575 m)       Physical Examination:  General: Well developed, well nourished, in no acute distress  Skin: Warm and dry, no rash or lesion appreciated  Head: Normocephalic, atraumatic  Neck: Normal range of motion  Respiratory: Clear to auscultation bilaterally with symmetrical effort  Cardiovascular: Normal S1, S2, Regular rate and rhythm  Extremities: Full range of motion, Normal gait  Neurological: Normal strength and sensation. No focal deficits  Psychological: Active, alert and oriented. Affect appropriate       Diagnoses and all orders for this visit:    1. Essential hypertension  -     METABOLIC PANEL, COMPREHENSIVE  -     TSH 3RD GENERATION  -     CBC W/O DIFF    2. Coronary artery disease due to lipid rich plaque  -     LIPID PANEL  -     METABOLIC PANEL, COMPREHENSIVE    3. Depression with anxiety  -     traZODone (DESYREL) 50 mg tablet; TAKE ONE TABLET BY MOUTH NIGHTLY  Indications: insomnia associated with depression  -     buPROPion XL (WELLBUTRIN XL) 150 mg tablet; TAKE ONE TABLET BY MOUTH EVERY MORNING  -     venlafaxine-SR (EFFEXOR-XR) 37.5 mg capsule; Take 1 Cap by mouth daily. 4. Blindness       Importance of compliance with all prescribed medications discussed. Continue current prescribed medications as written. I have discussed the diagnosis with the patient and the intended plan as seen in the above orders. The patient expresses understanding and agreement with our plan of care. All of the patient's questions were answered to apparent satisfaction. The patient has received an after-visit summary. The patient knows to call our office if there are any questions or concerns regarding diagnosis and treatment plans. I have discussed medication side effects and warnings with the patient as well. Over half of the 25 minutes face to face with Chey Daley consisted of counseling and discussing treatment plans in reference to her multiple co-morbidities. Follow-up Disposition:  Return in about 2 months (around 10/30/2018), or if symptoms worsen or fail to improve.

## 2018-08-30 NOTE — PATIENT INSTRUCTIONS
Advance Directives: Care Instructions  Your Care Instructions  An advance directive is a legal way to state your wishes at the end of your life. It tells your family and your doctor what to do if you can no longer say what you want. There are two main types of advance directives. You can change them any time that your wishes change. · A living will tells your family and your doctor your wishes about life support and other treatment. · A durable power of  for health care lets you name a person to make treatment decisions for you when you can't speak for yourself. This person is called a health care agent. If you do not have an advance directive, decisions about your medical care may be made by a doctor or a  who doesn't know you. It may help to think of an advance directive as a gift to the people who care for you. If you have one, they won't have to make tough decisions by themselves. Follow-up care is a key part of your treatment and safety. Be sure to make and go to all appointments, and call your doctor if you are having problems. It's also a good idea to know your test results and keep a list of the medicines you take. How can you care for yourself at home? · Discuss your wishes with your loved ones and your doctor. This way, there are no surprises. · Many states have a unique form. Or you might use a universal form that has been approved by many states. This kind of form can sometimes be completed and stored online. Your electronic copy will then be available wherever you have a connection to the Internet. In most cases, doctors will respect your wishes even if you have a form from a different state. · You don't need a  to do an advance directive. But you may want to get legal advice. · Think about these questions when you prepare an advance directive:  ¨ Who do you want to make decisions about your medical care if you are not able to?  Many people choose a family member or close friend. ¨ Do you know enough about life support methods that might be used? If not, talk to your doctor so you understand. ¨ What are you most afraid of that might happen? You might be afraid of having pain, losing your independence, or being kept alive by machines. ¨ Where would you prefer to die? Choices include your home, a hospital, or a nursing home. ¨ Would you like to have information about hospice care to support you and your family? ¨ Do you want to donate organs when you die? ¨ Do you want certain Worship practices performed before you die? If so, put your wishes in the advance directive. · Read your advance directive every year, and make changes as needed. When should you call for help? Be sure to contact your doctor if you have any questions. Where can you learn more? Go to http://whitney-gage.info/. Enter R264 in the search box to learn more about \"Advance Directives: Care Instructions. \"  Current as of: October 6, 2017  Content Version: 11.7  © 8412-7305 [employer]. Care instructions adapted under license by [employer] (which disclaims liability or warranty for this information). If you have questions about a medical condition or this instruction, always ask your healthcare professional. [address] any warranty or liability for your use of this information.

## 2018-08-30 NOTE — PROGRESS NOTES
Health Maintenance Due   Topic Date Due    DTaP/Tdap/Td series (1 - Tdap) 04/22/1973    GLAUCOMA SCREENING Q2Y  04/22/2017    Bone Densitometry (Dexa) Screening  04/22/2017    Pneumococcal 65+ Low/Medium Risk (1 of 2 - PCV13) 10/18/2017    BREAST CANCER SCRN MAMMOGRAM  05/20/2018    Influenza Age 9 to Adult  08/01/2018     Body mass index is 29.96 kg/(m^2). 1. Have you been to the ER, urgent care clinic since your last visit? Hospitalized since your last visit? No    2. Have you seen or consulted any other health care providers outside of the [address] [place] [address] since your last visit? Include any pap smears or colon screening.  No  Reviewed record in preparation for visit and have necessary documentation  Pt did not bring medication to office visit for review  Information was given to pt on Advanced Directives, Living Will  Information was given on Shingles Vaccine  opportunity was given for questions  Goals that were addressed and/or need to be completed during or after this appointment include

## 2018-08-30 NOTE — MR AVS SNAPSHOT
[address] [place] [address] 
[phone] Patient: Manpreet Mayfield MRN: [id number] FYS:[id number] Visit Information Date & Time Provider Department Dept. Phone Encounter #  
 8/30/2018 11:10 AM Paul Logan MD  [facility] [id number] Upcoming Health Maintenance Date Due DTaP/Tdap/Td series (1 - Tdap) 4/22/1973 GLAUCOMA SCREENING Q2Y 4/22/2017 Bone Densitometry (Dexa) Screening 4/22/2017 Pneumococcal 65+ Low/Medium Risk (1 of 2 - PCV13) 10/18/2017 BREAST CANCER SCRN MAMMOGRAM 5/20/2018 Influenza Age 5 to Adult 8/1/2018 FOBT Q 1 YEAR AGE 50-75 7/6/2028* MEDICARE YEARLY EXAM 10/18/2018 *Topic was postponed. The date shown is not the original due date. Allergies as of 8/30/2018  Review Complete On: 8/30/2018 By: Emory Late Severity Noted Reaction Type Reactions Percocet [Oxycodone-acetaminophen]  03/29/2012    Itching Current Immunizations  Reviewed on 10/24/2017 Name Date Influenza High Dose Vaccine PF 10/18/2017 Influenza Vaccine 10/16/2013 Influenza Vaccine Split 10/4/2012 Pneumococcal Polysaccharide (PPSV-23) 10/18/2016 Zoster Vaccine, Live 8/29/2017 Not reviewed this visit You Were Diagnosed With   
  
 Codes Comments Essential hypertension    -  Primary ICD-10-CM: I10 
ICD-9-CM: 401.9 Coronary artery disease due to lipid rich plaque     ICD-10-CM: I25.10, I25.83 ICD-9-CM: 414.00, 414.3 Depression with anxiety     ICD-10-CM: F41.8 ICD-9-CM: 300.4 Blindness     ICD-10-CM: H54.7 ICD-9-CM: 369.00 Vitals BP Pulse Temp Resp Height(growth percentile) Weight(growth percentile) 144/80 72 98.3 °F (36.8 °C) (Oral) 20 5' 2\" (1.575 m) 163 lb 12.8 oz (74.3 kg) SpO2 BMI OB Status Smoking Status 98% 29.96 kg/m2 Postmenopausal Never Smoker Vitals History BMI and BSA Data Body Mass Index Body Surface Area  
 29.96 kg/m 2 1.8 m 2 Preferred Pharmacy Pharmacy Name Phone [facility]  [phone] Your Updated Medication List  
  
   
This list is accurate as of 8/30/18 12:18 PM.  Always use your most recent med list.  
  
  
  
  
 acetaminophen 650 mg Tber Commonly known as:  TYLENOL ARTHRITIS PAIN Take 1 Tab by mouth every six (6) hours as needed for Pain. albuterol 90 mcg/actuation inhaler Commonly known as:  PROVENTIL HFA, VENTOLIN HFA, PROAIR HFA Take 2 Puffs by inhalation every four (4) hours as needed for Wheezing. aspirin 81 mg tablet Take 81 mg by mouth. atenolol 50 mg tablet Commonly known as:  TENORMIN  
TAKE ONE TABLET BY MOUTH DAILY  
  
 buPROPion  mg tablet Commonly known as:  WELLBUTRIN XL  
TAKE ONE TABLET BY MOUTH EVERY MORNING  
  
 calcitRIOL 0.25 mcg capsule Commonly known as:  ROCALTROL Take 1 Cap by mouth daily. cetirizine 10 mg tablet Commonly known as:  ZYRTEC Take 1 Tab by mouth daily. clopidogrel 75 mg Tab Commonly known as:  PLAVIX TAKE 1 TABLET BY MOUTH DAILY  
  
 cyanocobalamin 1,000 mcg tablet Take 1 Tab by mouth daily. fluticasone 50 mcg/actuation nasal spray Commonly known as:  FLONASE  
USE 2 SPRAYS IN BOTH NOSTRILS DAILY  
  
 folic acid 1 mg tablet Commonly known as:  Google Take 1 Tab by mouth daily. furosemide 20 mg tablet Commonly known as:  LASIX TAKE ONE TABLET BY MOUTH EVERY DAY  
  
 ibuprofen 400 mg tablet Commonly known as:  MOTRIN  
TAKE ONE TABLET BY MOUTH EVERY 6 HOURS AS NEEDED FOR PAIN  
  
 lidocaine 4 % topical cream  
Commonly known as:  XYLOCAINE Apply  to affected area four (4) times daily as needed for Pain. LORazepam 1 mg tablet Commonly known as:  ATIVAN Take 1 Tab by mouth every eight (8) hours as needed for Anxiety. nitroglycerin 0.4 mg SL tablet Commonly known as:  NITROSTAT  
1 Tab by SubLINGual route as needed for Chest Pain.  
  
 potassium chloride SA 10 mEq capsule Commonly known as:  MICRO-K  
TAKE ONE CAPSULE BY MOUTH TWICE A DAY promethazine 25 mg tablet Commonly known as:  PHENERGAN Take 1 Tab by mouth every six (6) hours as needed for Nausea. raNITIdine 150 mg tablet Commonly known as:  ZANTAC Take 1 Tab by mouth nightly. simvastatin 40 mg tablet Commonly known as:  ZOCOR  
TAKE ONE TABLET BY MOUTH NIGHTLY  
  
 traZODone 50 mg tablet Commonly known as:  DESYREL  
TAKE ONE TABLET BY MOUTH NIGHTLY  Indications: insomnia associated with depression  
  
 triamcinolone acetonide 0.1 % ointment Commonly known as:  KENALOG Apply  to affected area two (2) times a day. use thin layer  
  
 venlafaxine-SR 37.5 mg capsule Commonly known as:  EFFEXOR-XR Take 1 Cap by mouth daily. Prescriptions Sent to Pharmacy Refills  
 traZODone (DESYREL) 50 mg tablet 3 Sig: TAKE ONE TABLET BY MOUTH NIGHTLY  Indications: insomnia associated with depression Class: Normal  
 Pharmacy: [address] [place] [address] #: [phone]  
 buPROPion XL (WELLBUTRIN XL) 150 mg tablet 1 Sig: TAKE ONE TABLET BY MOUTH EVERY MORNING Class: Normal  
 Pharmacy: [address] [place] [address] #: [phone]  
 venlafaxine-SR (EFFEXOR-XR) 37.5 mg capsule 1 Sig: Take 1 Cap by mouth daily. Class: Normal  
 Pharmacy: [address] [place] [address] #: [phone] Route: Oral  
  
We Performed the Following CBC W/O DIFF [68687 CPT(R)] LIPID PANEL [78967 CPT(R)] METABOLIC PANEL, COMPREHENSIVE [29008 CPT(R)] TSH 3RD GENERATION [81270 CPT(R)] Patient Instructions Advance Directives: Care Instructions Your Care Instructions An advance directive is a legal way to state your wishes at the end of your life. It tells your family and your doctor what to do if you can no longer say what you want. There are two main types of advance directives. You can change them any time that your wishes change. · A living will tells your family and your doctor your wishes about life support and other treatment. · A durable power of  for health care lets you name a person to make treatment decisions for you when you can't speak for yourself. This person is called a health care agent. If you do not have an advance directive, decisions about your medical care may be made by a doctor or a  who doesn't know you. It may help to think of an advance directive as a gift to the people who care for you. If you have one, they won't have to make tough decisions by themselves. Follow-up care is a key part of your treatment and safety. Be sure to make and go to all appointments, and call your doctor if you are having problems. It's also a good idea to know your test results and keep a list of the medicines you take. How can you care for yourself at home? · Discuss your wishes with your loved ones and your doctor. This way, there are no surprises. · Many states have a unique form. Or you might use a universal form that has been approved by many states. This kind of form can sometimes be completed and stored online. Your electronic copy will then be available wherever you have a connection to the Internet. In most cases, doctors will respect your wishes even if you have a form from a different state. · You don't need a  to do an advance directive. But you may want to get legal advice. · Think about these questions when you prepare an advance directive: ¨ Who do you want to make decisions about your medical care if you are not able to? Many people choose a family member or close friend. ¨ Do you know enough about life support methods that might be used? If not, talk to your doctor so you understand. ¨ What are you most afraid of that might happen? You might be afraid of having pain, losing your independence, or being kept alive by machines. ¨ Where would you prefer to die? Choices include your home, a hospital, or a nursing home. ¨ Would you like to have information about hospice care to support you and your family? ¨ Do you want to donate organs when you die? ¨ Do you want certain Latter day practices performed before you die? If so, put your wishes in the advance directive. · Read your advance directive every year, and make changes as needed. When should you call for help? Be sure to contact your doctor if you have any questions. Where can you learn more? Go to http://whitney-gage.info/. Enter R264 in the search box to learn more about \"Advance Directives: Care Instructions. \" Current as of: October 6, 2017 Content Version: 11.7 © 9220-0805 [employer]orporated. Care instructions adapted under license by [employer] (which disclaims liability or warranty for this information). If you have questions about a medical condition or this instruction, always ask your healthcare professional. [place] [address] any warranty or liability for your use of this information. Introducing [facility]! [employer] introduces [employer] patient portal. Now you can access parts of your medical record, email your doctor's office, and request medication refills online. 1. In your internet browser, go to [email or web address] 2. Click on the First Time User? Click Here link in the Sign In box. You will see the New Member Sign Up page. 3. Enter your [employer] Access Code exactly as it appears below. You will not need to use this code after youve completed the sign-up process.  If you do not sign up before the expiration date, you must request a new code. · [employer] Access Code: [email or web address] Expires: 11/28/2018 11:01 AM 
 
4. Enter the last four digits of your Social Security Number (xxxx) and Date of Birth (mm/dd/yyyy) as indicated and click Submit. You will be taken to the next sign-up page. 5. Create a [employer] ID. This will be your [employer] login ID and cannot be changed, so think of one that is secure and easy to remember. 6. Create a [employer] password. You can change your password at any time. 7. Enter your Password Reset Question and Answer. This can be used at a later time if you forget your password. 8. Enter your e-mail address. You will receive e-mail notification when new information is available in [address] 9. Click Sign Up. You can now view and download portions of your medical record. 10. Click the Download Summary menu link to download a portable copy of your medical information. If you have questions, please visit the Frequently Asked Questions section of the [employer] website. Remember, [employer] is NOT to be used for urgent needs. For medical emergencies, dial 911. Now available from your iPhone and Android! Please provide this summary of care documentation to your next provider. Your primary care clinician is listed as Reji Millard. If you have any questions after today's visit, please call [phone].

## 2018-08-31 ENCOUNTER — TELEPHONE (OUTPATIENT)
Dept: FAMILY MEDICINE CLINIC | Age: 68
End: 2018-08-31

## 2018-08-31 LAB
ALBUMIN SERPL-MCNC: 4.4 G/DL (ref 3.6–4.8)
ALBUMIN/GLOB SERPL: 1.8 {RATIO} (ref 1.2–2.2)
ALP SERPL-CCNC: 74 IU/L (ref 39–117)
ALT SERPL-CCNC: 15 IU/L (ref 0–32)
AST SERPL-CCNC: 16 IU/L (ref 0–40)
BILIRUB SERPL-MCNC: 0.3 MG/DL (ref 0–1.2)
BUN SERPL-MCNC: 13 MG/DL (ref 8–27)
BUN/CREAT SERPL: 14 (ref 12–28)
CALCIUM SERPL-MCNC: 9.4 MG/DL (ref 8.7–10.3)
CHLORIDE SERPL-SCNC: 105 MMOL/L (ref 96–106)
CHOLEST SERPL-MCNC: 160 MG/DL (ref 100–199)
CO2 SERPL-SCNC: 23 MMOL/L (ref 20–29)
CREAT SERPL-MCNC: 0.95 MG/DL (ref 0.57–1)
ERYTHROCYTE [DISTWIDTH] IN BLOOD BY AUTOMATED COUNT: 13 % (ref 12.3–15.4)
GLOBULIN SER CALC-MCNC: 2.5 G/DL (ref 1.5–4.5)
GLUCOSE SERPL-MCNC: 87 MG/DL (ref 65–99)
HCT VFR BLD AUTO: 40.7 % (ref 34–46.6)
HDLC SERPL-MCNC: 52 MG/DL
HGB BLD-MCNC: 13.7 G/DL (ref 11.1–15.9)
LDLC SERPL CALC-MCNC: 81 MG/DL (ref 0–99)
MCH RBC QN AUTO: 32.9 PG (ref 26.6–33)
MCHC RBC AUTO-ENTMCNC: 33.7 G/DL (ref 31.5–35.7)
MCV RBC AUTO: 98 FL (ref 79–97)
PLATELET # BLD AUTO: 178 X10E3/UL (ref 150–379)
POTASSIUM SERPL-SCNC: 4.5 MMOL/L (ref 3.5–5.2)
PROT SERPL-MCNC: 6.9 G/DL (ref 6–8.5)
RBC # BLD AUTO: 4.17 X10E6/UL (ref 3.77–5.28)
SODIUM SERPL-SCNC: 143 MMOL/L (ref 134–144)
TRIGL SERPL-MCNC: 137 MG/DL (ref 0–149)
TSH SERPL DL<=0.005 MIU/L-ACNC: 1.68 UIU/ML (ref 0.45–4.5)
VLDLC SERPL CALC-MCNC: 27 MG/DL (ref 5–40)
WBC # BLD AUTO: 5.7 X10E3/UL (ref 3.4–10.8)

## 2018-08-31 NOTE — TELEPHONE ENCOUNTER
----- Message from Supa Qureshi MD sent at 8/31/2018  8:16 AM EDT -----  Call patient. Results stable and acceptable when compared with prior lab values.

## 2018-09-18 ENCOUNTER — NURSE NAVIGATOR (OUTPATIENT)
Dept: FAMILY MEDICINE CLINIC | Age: 68
End: 2018-09-18

## 2018-09-18 ENCOUNTER — CLINICAL SUPPORT (OUTPATIENT)
Dept: FAMILY MEDICINE CLINIC | Age: 68
End: 2018-09-18

## 2018-09-18 VITALS — TEMPERATURE: 97.1 F

## 2018-09-18 DIAGNOSIS — Z23 ENCOUNTER FOR IMMUNIZATION: Primary | ICD-10-CM

## 2018-09-18 NOTE — ACP (ADVANCE CARE PLANNING)
[facility]     Prior to today's conversation, did individual have existing ACP documents? [] Yes  [x] No      Date of conversation: 9/18/2018   Location:[facility]    Participants: (in person or by phone)   [x] Patient    [x] Prospective agent (not yet named in a document) / Other surrogate decision  maker / next of kin    Name: Ti Alvarenga    Relationship to patient:son       [] Healthcare agent (already designated in prior ACP document)    Name:     Relationship to patient:       [] Other:  Name:       Individual's Fears/Concerns about planning: Patient stated she really didn't have any fears/concerns with planning. Patient stated \"I want to take care of my wishes while I'm still able to to help my family later\". Goals/Narrative of Conversation: To completely understanding [employer] and what ACP is. Conversation topics for Individual    Has learned the following from prior experiences with serious illness: Her dad was in a nursing home and had a DDNR so patient states when it was his time to go, that's what happened. Identifies the following as important for living well: Being able to talk to her two dogs whom she loves dearly. \"What cultural, Denominational, spiritual, or personal beliefs (if any) do you have that might help you choose the care you want, or do not want? \"  Patient response: \"I believe when it's your time to go, you should go. I don't want anything done to me to prevent that from happening when it's my time. I trust God's timing\".     Conversation topics for Agent / S[place]a [place] agent's understanding of the role:    Agent confirms Willingness to:   [x]Yes []No Accept role   [x] Yes []No Talk with individual about his/her goals, values, & preferences   [x] Yes [] No   Follow individual's decisions, even if do not agree with those    decisions   [x]Yes  []No Make decisions in difficult moments      Topics for Chronic Illness (if applicable):  [] N/A    \"What do you understand about your (CAD) and the complications that may occur? \"  Patient response: When I have my 5 way bypass, the doctor told me that it would be good for about 10-15 years. \"What do you understand about CPR? \" Patient response: That sometimes it's works. \"What would be an unacceptable outcome of CPR to you? \" Patient response: It didn't change the outcome.     [x] Yes  [] No   Educated patient regarding differences between AMD and DDNR  [] Yes  [] No   If patient requested DDNR order, referred to provider for follow-up    [employer]® ACP Facilitator: Patricia Adkins RN    Length (minutes): 90    The following was provided (check all that apply):   [x] Written information on the planning process      Healthcare Decision Information Cards:   [x] Help with Breathing Facts   [x] Tube Feeding Facts   [x] CPR Facts      [x] Review of advance directive form   [] Review of existing advance directive       Meeting Outcomes:   [x] ACP discussion completed   [x] Advance directive form completed   [x] Original of completed advance directive given to patient   [x] Copy given to healthcare agent    [x] Copy scanned to electronic medical record         Follow-up plan:     [] Schedule follow-up conversation to continue planning   [] Referred individual to provider for additional questions/concerns    [] Individual will invite agent/prospective agent to next ACP appointment   [x] Recommended to review completed AMD annually or upon change in health status     [] This note routed to one or more involved healthcare providers

## 2018-10-25 DIAGNOSIS — F41.8 DEPRESSION WITH ANXIETY: ICD-10-CM

## 2018-10-25 DIAGNOSIS — Z76.0 ENCOUNTER FOR MEDICATION REFILL: ICD-10-CM

## 2018-10-29 RX ORDER — LORAZEPAM 1 MG/1
1 TABLET ORAL
Qty: 60 TAB | Refills: 0 | Status: SHIPPED | OUTPATIENT
Start: 2018-10-29 | End: 2018-12-04 | Stop reason: SDUPTHER

## 2018-10-31 ENCOUNTER — TELEPHONE (OUTPATIENT)
Dept: FAMILY MEDICINE CLINIC | Age: 68
End: 2018-10-31

## 2018-10-31 DIAGNOSIS — F41.8 DEPRESSION WITH ANXIETY: ICD-10-CM

## 2018-10-31 NOTE — TELEPHONE ENCOUNTER
Please contact Ms Lashayscott Karolina at  for unable to understand letter she received of lab results & why is appointment required before another refill on Ativan

## 2018-10-31 NOTE — LETTER
AGREEMENT for controlled medication treatment Duartedesmond Boland, have agreed to a course of treatment that includes taking controlled medication. For this treatment I designate _____________________________________ as the [facility] Provider.  The purpose of this agreement is to prevent misunderstandings about controlled medications I may be taking for treatment, and to comply with applicable laws. I understand this agreement is essential to the trust and confidence necessary in a provider-patient relationship and that the designated provider will treat me in accordance with the statements below. As part of my treatment I agree to the followin.  USE 
a. I will take the controlled medication as instructed by the designated provider and avoid improper use of controlled medications. b.   I will not share, sell or trade controlled medication with anyone as this is a criminal offense.  
c.   I will not use any illegal controlled substance, including marijuana, cocaine, etc. as this is a criminal offense. 2.  PROVIDERS 
a. I will only obtain controlled medication from the designated provider. b.   I will not attempt to obtain the same or similar controlled medications, such as opioid pain medication, stimulants, anti-anxiety or hypnotics from any other provider as this is a criminal offense. 
c.   I will inform the designated provider about all other licensed professionals providing medical care to me and authorize communication between all providers to coordinate care, particularly prescribing or dispensing of controlled medications. 3.  PHARMACY 
a.   I will only fill controlled medication prescriptions at the approved pharmacy as listed below. 4.  OFFICE VISITS 
a. I agree to attend scheduled office visit appointments. b.   I am aware my office visits may be monthly or as determined necessary by the designated provider. c.   I will communicate fully with the designated provider about the character and intensity of my symptoms, the effect of the symptoms on my daily life, and how well the controlled medication is helping to relieve the cause of my symptoms. 5.  REFILLS OR CALL-IN PRESCRIPTIONS OF CONTROLLED MEDICATIONS 
a. I agree that refills of my prescriptions for controlled medications will be made at the time of an office visit during regular office hours. No refills will be available during evenings or on weekends. Abusive or inappropriate behavior related to medication refills will not be tolerated. b.   I will not call the office repeatedly to inquire about my controlled medication. I understand that medications will be written on the due date and not before. Calling the office repeatedly will be considered harassment, and I may be discharged from the practice. c.   Controlled medications may not be called in for refill, but doing so is at the discretion of the designated provider. d.   I am aware that only I must  prescriptions for controlled medications at the office but the designated provider may allow a designee to  the prescription from the office under very specific circumstance that may develop. 6.  TOXICOLOGY SCREENING 
a. I agree to random urine toxicology screenings in order to comply with government and [address] [place] [address] regulations. I understand that I will be financially responsible for any charges incurred for the urine toxicology screening, which may not be covered by my insurance. Failure to do so will be considered non-compliance and I may be discharged. b. In some cases an oral swab or hair sample may be substituted for a urine screen. This is at the discretion of the designated provider.   I understand that I will be financially responsible for any charges incurred as well. 
c.   I understand that if the urine toxicology does not show my medications prescribed to me by the designated provider, or it shows any illegal substance or any other medications NOT prescribed by the designated providers, I may be discharged from this practice at the discretion of the designated provider and no further controlled medications will be prescribed or follow-up appointments scheduled. Also, if any illegal substances are detected on the urine toxicology, this information may be provided to local law enforcement. 7. PILL COUNTS 
a. I am aware I may be called at any time to come into the office for a count of all my remaining controlled medications in order to help the designated provider understand the rate at which I use my controlled medications and to more effectively adjust dosage. b. I agree that I will use my medication at a rate NO greater than the prescribed rate and that use of my medication at a greater rate will result in my being without medication for the period of time until next expected due date. c. I will bring in the containers with the medication prescribed by all providers, including the designated provider, to each office visit even if there is no medication remaining. All controlled medication will be in the original containers from the pharmacy for each medication. Failure to do so will be considered non-compliance and I may be discharged from the practice. 8.  LOSS OR THEFT OF MEDICATION 
a. I will safeguard my controlled medication from loss or theft. b.   Lost or stolen controlled medication may not be replaced. This includes a prescription that has not yet been filled at the pharmacy. c.   In the event my controlled medications are stolen or lost, I will notify the designated providers office immediately. If such event occurs during the night, weekend or holiday, I will leave a detailed message on the answering machine or answering service at the number listed above. d.   I will file and produce an official police report for any effort to replace controlled medications prescribed. 9.  AGENCY COLLABORATION I authorize the designated provider and the authorized pharmacy/pharmacist to cooperate fully with any city, state, or federal law enforcement agency in the investigation of any possible misuse, sale or other diversion of my controlled medication. 10.  TREATMENT I understand that if I violate any of the conditions, my controlled medication and/or treatment will be terminated. If the violation involves obtaining controlled substances and/or dangerous drugs from another source, the incident may be reported to other medical facilities and authorities, including law enforcement. In this case, the designated provider may taper off the medication over a period of several days, as necessary, to avoid withdrawal symptoms or will suggest alternate treatment facilities. Also, a drug dependence treatment program may be recommended. 11.  AGREEMENT I agree to follow these guidelines that have been fully explained to me. All of my questions and concerns regarding treatment have been adequately answered. A copy of this document has been given to me. I agree to use ______________________________ Authorized Pharmacy located at _________________________________________________________________ Telephone ________________________________for filling ALL controlled medication prescriptions. This agreement is entered into on 11/1/2018 Patient signature__________________________________________________________ Legal Guardian signature___________________________________________________ Provider signature_________________________________________________________ Witness signature_________________________________________________________

## 2018-11-01 NOTE — TELEPHONE ENCOUNTER
Called patient to advise of message per Dr. Ely Moore, she expressed understanding and will come by to sign paperwork. Patient also advised she has stopped taking Effexor. She took one capsule and it made her sick so she has stopped taking medication. Advised patient if she would like to come in to discuss she may make appointment. She expressed understanding.

## 2018-11-01 NOTE — TELEPHONE ENCOUNTER
----- Message from Kady Carlos sent at 11/1/2018  7:43 AM EDT -----  Regarding: Dr. Haim Ireland  Pt would like to know why she would have to come in,  In a 3 month period to get a prescription refill? Pt would like the nurse to call her back. Callback: [phone]

## 2018-12-04 DIAGNOSIS — Z76.0 ENCOUNTER FOR MEDICATION REFILL: ICD-10-CM

## 2018-12-04 DIAGNOSIS — F41.8 DEPRESSION WITH ANXIETY: ICD-10-CM

## 2018-12-05 RX ORDER — LORAZEPAM 1 MG/1
1 TABLET ORAL
Qty: 60 TAB | Refills: 0 | Status: SHIPPED | OUTPATIENT
Start: 2018-12-05 | End: 2019-01-07 | Stop reason: SDUPTHER

## 2019-01-07 DIAGNOSIS — Z76.0 ENCOUNTER FOR MEDICATION REFILL: ICD-10-CM

## 2019-01-07 DIAGNOSIS — F41.8 DEPRESSION WITH ANXIETY: ICD-10-CM

## 2019-01-08 RX ORDER — LORAZEPAM 1 MG/1
1 TABLET ORAL
Qty: 60 TAB | Refills: 0 | Status: SHIPPED | OUTPATIENT
Start: 2019-01-08 | End: 2019-02-18 | Stop reason: SDUPTHER

## 2019-01-22 DIAGNOSIS — I25.10 CORONARY ARTERY DISEASE DUE TO LIPID RICH PLAQUE: ICD-10-CM

## 2019-01-22 DIAGNOSIS — I25.83 CORONARY ARTERY DISEASE DUE TO LIPID RICH PLAQUE: ICD-10-CM

## 2019-01-23 ENCOUNTER — TELEPHONE (OUTPATIENT)
Dept: FAMILY MEDICINE CLINIC | Age: 69
End: 2019-01-23

## 2019-01-23 NOTE — TELEPHONE ENCOUNTER
Pt goes to [facility] in [place]. They offer a Blood Pressure cuff that talks. The one she has is broken. She needs a prescription for this cuff to be written so Pt can receive it from the [place]. With this script she can get it free of charge if it is written from Dr. Claire Garber. The Pt is bringing in the card with the info when she comes in on Friday for her appointment.

## 2019-01-24 RX ORDER — CLOPIDOGREL BISULFATE 75 MG/1
TABLET ORAL
Qty: 90 TAB | Refills: 1 | Status: SHIPPED | OUTPATIENT
Start: 2019-01-24 | End: 2019-04-25 | Stop reason: SDUPTHER

## 2019-01-25 ENCOUNTER — OFFICE VISIT (OUTPATIENT)
Dept: FAMILY MEDICINE CLINIC | Age: 69
End: 2019-01-25

## 2019-01-25 VITALS
SYSTOLIC BLOOD PRESSURE: 141 MMHG | OXYGEN SATURATION: 99 % | TEMPERATURE: 98 F | HEART RATE: 64 BPM | RESPIRATION RATE: 16 BRPM | HEIGHT: 62 IN | BODY MASS INDEX: 29.96 KG/M2 | DIASTOLIC BLOOD PRESSURE: 84 MMHG

## 2019-01-25 DIAGNOSIS — H54.7 BLINDNESS: ICD-10-CM

## 2019-01-25 DIAGNOSIS — I10 ESSENTIAL HYPERTENSION: Primary | ICD-10-CM

## 2019-01-25 DIAGNOSIS — Z13.31 SCREENING FOR DEPRESSION: ICD-10-CM

## 2019-01-25 DIAGNOSIS — Z13.39 SCREENING FOR ALCOHOLISM: ICD-10-CM

## 2019-01-25 DIAGNOSIS — M25.512 ACUTE PAIN OF LEFT SHOULDER: ICD-10-CM

## 2019-01-25 DIAGNOSIS — Z00.00 MEDICARE ANNUAL WELLNESS VISIT, SUBSEQUENT: ICD-10-CM

## 2019-01-25 DIAGNOSIS — F33.1 MODERATE EPISODE OF RECURRENT MAJOR DEPRESSIVE DISORDER (HCC): ICD-10-CM

## 2019-01-25 DIAGNOSIS — K21.9 GASTROESOPHAGEAL REFLUX DISEASE WITHOUT ESOPHAGITIS: ICD-10-CM

## 2019-01-25 RX ORDER — GABAPENTIN 100 MG/1
100 CAPSULE ORAL
Qty: 30 CAP | Refills: 0 | Status: SHIPPED | OUTPATIENT
Start: 2019-01-25 | End: 2019-02-08 | Stop reason: SDUPTHER

## 2019-01-25 RX ORDER — ESOMEPRAZOLE MAGNESIUM 40 MG/1
40 CAPSULE, DELAYED RELEASE ORAL DAILY
Qty: 30 CAP | Refills: 3 | Status: SHIPPED | OUTPATIENT
Start: 2019-01-25 | End: 2019-04-25 | Stop reason: SDUPTHER

## 2019-01-25 NOTE — PATIENT INSTRUCTIONS
Medicare Wellness Visit, Female The best way to live healthy is to have a lifestyle where you eat a well-balanced diet, exercise regularly, limit alcohol use, and quit all forms of tobacco/nicotine, if applicable. Regular preventive services are another way to keep healthy. Preventive services (vaccines, screening tests, monitoring & exams) can help personalize your care plan, which helps you manage your own care. Screening tests can find health problems at the earliest stages, when they are easiest to treat. [facility] follows the current, evidence-based guidelines published by the [facility] ([facility]) when recommending preventive services for our patients. Because we follow these guidelines, sometimes recommendations change over time as research supports it. (For example, mammograms used to be recommended annually. Even though Medicare will still pay for an annual mammogram, the newer guidelines recommend a mammogram every two years for women of average risk.) Of course, you and your doctor may decide to screen more often for some diseases, based on your risk and your health status. Preventive services for you include: - Medicare offers their members a free annual wellness visit, which is time for you and your primary care provider to discuss and plan for your preventive service needs. Take advantage of this benefit every year! 
-All adults over the age of 72 should receive the recommended pneumonia vaccines. Current USPSTF guidelines recommend a series of two vaccines for the best pneumonia protection.  
-All adults should have a flu vaccine yearly and a tetanus vaccine every 10 years. All adults age 61 and older should receive a shingles vaccine once in their lifetime.   
-A bone mass density test is recommended when a woman turns 65 to screen for osteoporosis. This test is only recommended one time, as a screening. Some providers will use this same test as a disease monitoring tool if you already have osteoporosis. -All adults age 38-68 who are overweight should have a diabetes screening test once every three years.  
-Other screening tests and preventive services for persons with diabetes include: an eye exam to screen for diabetic retinopathy, a kidney function test, a foot exam, and stricter control over your cholesterol.  
-Cardiovascular screening for adults with routine risk involves an electrocardiogram (ECG) at intervals determined by your doctor.  
-Colorectal cancer screenings should be done for adults age 54-65 with no increased risk factors for colorectal cancer. There are a number of acceptable methods of screening for this type of cancer. Each test has its own benefits and drawbacks. Discuss with your doctor what is most appropriate for you during your annual wellness visit. The different tests include: colonoscopy (considered the best screening method), a fecal occult blood test, a fecal DNA test, and sigmoidoscopy. -Breast cancer screenings are recommended every other year for women of normal risk, age 54-69. 
-Cervical cancer screenings for women over age 72 are only recommended with certain risk factors.  
-All adults born between [facility] should be screened once for Hepatitis C. Here is a list of your current Health Maintenance items (your personalized list of preventive services) with a due date: 
Health Maintenance Due Topic Date Due  Shingles Vaccine (1 of 2) 04/22/2002  Glaucoma Screening   04/22/2017  Bone Mineral Density   04/22/2017  Breast Cancer Screening  05/20/2018 [facility] Annual Well Visit  10/18/2018

## 2019-01-25 NOTE — PROGRESS NOTES
Chief Complaint Patient presents with  Annual Wellness Visit  
 
she is a 79y.o. year old female who presents for follow up of her chronic medical conditions. Patient with hx of CAD, HTN, HLD, GERD, B12 deficiency, blindness, anxiety and depression. Patient denies HA, dizziness, SOB, CP, abdominal pain, dysuria. She complains of left shoulder pain sans acute injury. Hypertension: The patient reports: Has run out of medication, no medication side effects noted, no TIA's, no chest pain on exertion, no dyspnea on exertion, no swelling of ankles. Lifestyle modification/social history: sedentary BP Readings from Last 3 Encounters:  
01/25/19 141/84  
08/30/18 144/80  
02/02/18 139/76 Patient advised to log blood pressures at home 3-5 times monthly and bring to next visit. Call office as soon as possible if BP's over 140/90 on multiple occasions or with symptoms of dizziness, chest pain, shortness of breath, headache or ankle swelling. Our goal is to normalize the blood pressure to decrease the risks of strokes and heart attacks. The patient is in agreement with the plan. Hyperlipidemia Cardiovascular risks for her are: Age, HTN, CAD Our goal is to lower hyperlipidemia to decrease the risks of strokes and heart attacks Patient Active Problem List  
Diagnosis Code  Anxiety F41.9  Hyperlipidemia E78.5  CAD (coronary artery disease) I25.10  Blindness H54.7  
 HTN (hypertension) I10  
 Osteopenia M85.80  
 B12 deficiency E53.8  Insomnia G47.00  Allergic rhinitis J30.9  Gastroesophageal reflux disease without esophagitis K21.9  Moderate episode of recurrent major depressive disorder (HCC) F33.1 Past Surgical History:  
Procedure Laterality Date  CABG, VEIN, FIVE  2006  CARDIAC SURG PROCEDURE UNLIST  HX ORTHOPAEDIC Social History Socioeconomic History  Marital status:  Spouse name: Not on file  Number of children: Not on file  Years of education: Not on file  Highest education level: Not on file Social Needs  Financial resource strain: Not on file  Food insecurity - worry: Not on file  Food insecurity - inability: Not on file  Transportation needs - medical: Not on file  Transportation needs - non-medical: Not on file Occupational History  Not on file Tobacco Use  Smoking status: Never Smoker  Smokeless tobacco: Never Used Substance and Sexual Activity  Alcohol use: Yes Comment: social  
 Drug use: No  
 Sexual activity: Yes  
  Partners: Male Birth control/protection: None Other Topics Concern  Not on file Social History Narrative  Not on file Family History Problem Relation Age of Onset  No Known Problems Mother  No Known Problems Father Current Outpatient Medications Medication Sig  
 miscellaneous medical supply (BLOOD PRESSURE CUFF) misc Blood Pressure Cuff That Talks.  pneumococcal 13 kalina conj dip (PREVNAR-13) 0.5 mL syrg injection 0.5 mL by IntraMUSCular route once for 1 dose.  diphtheria-pertussis, acellular,-tetanus (ACELL) 2.5-8-5 Lf-mcg-Lf/0.5mL susp susp 0.5 mL by IntraMUSCular route once for 1 dose.  esomeprazole (NEXIUM) 40 mg capsule Take 1 Cap by mouth daily.  gabapentin (NEURONTIN) 100 mg capsule Take 1 Cap by mouth nightly as needed.  clopidogrel (PLAVIX) 75 mg tab TAKE 1 TABLET BY MOUTH DAILY  LORazepam (ATIVAN) 1 mg tablet Take 1 Tab by mouth every eight (8) hours as needed for Anxiety.  simvastatin (ZOCOR) 40 mg tablet TAKE ONE TABLET BY MOUTH NIGHTLY  raNITIdine (ZANTAC) 150 mg tablet TAKE 1 TABLET BY MOUTH NIGHTLY  traZODone (DESYREL) 50 mg tablet TAKE ONE TABLET BY MOUTH NIGHTLY  Indications: insomnia associated with depression  buPROPion XL (WELLBUTRIN XL) 150 mg tablet TAKE ONE TABLET BY MOUTH EVERY MORNING  
  atenolol (TENORMIN) 50 mg tablet TAKE ONE TABLET BY MOUTH DAILY  furosemide (LASIX) 20 mg tablet TAKE ONE TABLET BY MOUTH EVERY DAY  albuterol (PROVENTIL HFA, VENTOLIN HFA, PROAIR HFA) 90 mcg/actuation inhaler Take 2 Puffs by inhalation every four (4) hours as needed for Wheezing.  potassium chloride SA (MICRO-K) 10 mEq capsule TAKE ONE CAPSULE BY MOUTH TWICE A DAY  nitroglycerin (NITROSTAT) 0.4 mg SL tablet 1 Tab by SubLINGual route as needed for Chest Pain.  cyanocobalamin 1,000 mcg tablet Take 1 Tab by mouth daily.  fluticasone (FLONASE) 50 mcg/actuation nasal spray USE 2 SPRAYS IN BOTH NOSTRILS DAILY  lidocaine (XYLOCAINE) 4 % topical cream Apply  to affected area four (4) times daily as needed for Pain.  acetaminophen (TYLENOL ARTHRITIS PAIN) 650 mg CR tablet Take 1 Tab by mouth every six (6) hours as needed for Pain.  promethazine (PHENERGAN) 25 mg tablet Take 1 Tab by mouth every six (6) hours as needed for Nausea.  folic acid (FOLVITE) 1 mg tablet Take 1 Tab by mouth daily.  cetirizine (ZYRTEC) 10 mg tablet Take 1 Tab by mouth daily.  calcitRIOL (ROCALTROL) 0.25 mcg capsule Take 1 Cap by mouth daily.  triamcinolone acetonide (KENALOG) 0.1 % ointment Apply  to affected area two (2) times a day. use thin layer  aspirin 81 mg tablet Take 81 mg by mouth. No current facility-administered medications for this visit. Allergies Allergen Reactions  Percocet [Oxycodone-Acetaminophen] Itching Review of Systems: 
Constitutional: Feeling well, denies fatigue, malaise Skin: Negative for rash or lesion HEENT: see HPI, Negative for acute hearing changes Respiratory: Negative for cough, wheezing or SOB Cardiovascular: Negative for chest pain, dizziness or palpitations Gastrointestinal: Negative for nausea or abdominal pain Genital/urinary: Negative for dysuria or voiding dysfunction Musculoskeletal: c/o left shoulder arthralgia Neurological: Negative for HA, weakness or paresthesia Psychlogical: Negative for depression or anxiety Vitals:  
 01/25/19 1134 BP: 141/84 Pulse: 64 Resp: 16 Temp: 98 °F (36.7 °C) TempSrc: Oral  
SpO2: 99% Height: 5' 2\" (1.575 m) Physical Examination: 
General: Well developed, well nourished, in no acute distress Skin: Warm and dry, no rash or lesion appreciated Head: Normocephalic, atraumatic Neck: Normal range of motion Respiratory: Clear to auscultation bilaterally with symmetrical effort Cardiovascular: Normal S1, S2, Regular rate and rhythm Extremities: left shoulder with full range of motion, proximal bicep TTP Neurological: Normal strength and sensation. No focal deficits Psychological: Active, alert and oriented. Affect appropriate Diagnoses and all orders for this visit: 1. Essential hypertension 
-     miscellaneous medical supply (BLOOD PRESSURE CUFF) misc; Blood Pressure Cuff That Talks. 2. Acute pain of left shoulder 
-     gabapentin (NEURONTIN) 100 mg capsule; Take 1 Cap by mouth nightly as needed. 3. Gastroesophageal reflux disease without esophagitis -     esomeprazole (NEXIUM) 40 mg capsule; Take 1 Cap by mouth daily. 4. Moderate episode of recurrent major depressive disorder (HCC) 5. Blindness Importance of compliance with all prescribed medications discussed. Continue current prescribed medications as written. I have discussed the diagnosis with the patient and the intended plan as seen in the above orders. The patient expresses understanding and agreement with our plan of care. All of the patient's questions were answered to apparent satisfaction. The patient has received an after-visit summary. The patient knows to call our office if there are any questions or concerns regarding diagnosis and treatment plans. I have discussed medication side effects and warnings with the patient as well. Follow-up Disposition: Return in about 6 months (around 7/25/2019). The following Annual Medicare Wellness Exam is distinct and separate from the medical evaluation and decision making. This is the Subsequent Medicare Annual Wellness Exam, performed 12 months or more after the Initial AWV or the last Subsequent AWV I have reviewed the patient's medical history in detail and updated the computerized patient record. History Past Medical History:  
Diagnosis Date  Blindness - both eyes  CAD (coronary artery disease)  GERD (gastroesophageal reflux disease)  Hypertension  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- [facility]- has GRIFFIN Past Surgical History:  
Procedure Laterality Date  CABG, VEIN, FIVE  2006  CARDIAC SURG PROCEDURE UNLIST  HX ORTHOPAEDIC Current Outpatient Medications Medication Sig Dispense Refill  miscellaneous medical supply (BLOOD PRESSURE CUFF) misc Blood Pressure Cuff That Talks. 1 Each 0  
 pneumococcal 13 kalina conj dip (PREVNAR-13) 0.5 mL syrg injection 0.5 mL by IntraMUSCular route once for 1 dose. 0.5 mL 0  
 diphtheria-pertussis, acellular,-tetanus (ACELL) 2.5-8-5 Lf-mcg-Lf/0.5mL susp susp 0.5 mL by IntraMUSCular route once for 1 dose. 0.5 mL 0  
 esomeprazole (NEXIUM) 40 mg capsule Take 1 Cap by mouth daily. 30 Cap 3  
 gabapentin (NEURONTIN) 100 mg capsule Take 1 Cap by mouth nightly as needed. 30 Cap 0  clopidogrel (PLAVIX) 75 mg tab TAKE 1 TABLET BY MOUTH DAILY 90 Tab 1  
 LORazepam (ATIVAN) 1 mg tablet Take 1 Tab by mouth every eight (8) hours as needed for Anxiety.  60 Tab 0  
 simvastatin (ZOCOR) 40 mg tablet TAKE ONE TABLET BY MOUTH NIGHTLY 90 Tab 0  
 raNITIdine (ZANTAC) 150 mg tablet TAKE 1 TABLET BY MOUTH NIGHTLY 90 Tab 0  
 traZODone (DESYREL) 50 mg tablet TAKE ONE TABLET BY MOUTH NIGHTLY  Indications: insomnia associated with depression 30 Tab 3  
 buPROPion XL (WELLBUTRIN XL) 150 mg tablet TAKE ONE TABLET BY MOUTH EVERY MORNING 90 Tab 1  
  atenolol (TENORMIN) 50 mg tablet TAKE ONE TABLET BY MOUTH DAILY 90 Tab 0  
 furosemide (LASIX) 20 mg tablet TAKE ONE TABLET BY MOUTH EVERY DAY 90 Tab 0  
 albuterol (PROVENTIL HFA, VENTOLIN HFA, PROAIR HFA) 90 mcg/actuation inhaler Take 2 Puffs by inhalation every four (4) hours as needed for Wheezing. 1 Inhaler 3  potassium chloride SA (MICRO-K) 10 mEq capsule TAKE ONE CAPSULE BY MOUTH TWICE A  Cap 0  
 nitroglycerin (NITROSTAT) 0.4 mg SL tablet 1 Tab by SubLINGual route as needed for Chest Pain. 1 Bottle 3  
 cyanocobalamin 1,000 mcg tablet Take 1 Tab by mouth daily. 90 Tab 1  
 fluticasone (FLONASE) 50 mcg/actuation nasal spray USE 2 SPRAYS IN BOTH NOSTRILS DAILY 16 g 3  
 lidocaine (XYLOCAINE) 4 % topical cream Apply  to affected area four (4) times daily as needed for Pain. 15 g 0  
 acetaminophen (TYLENOL ARTHRITIS PAIN) 650 mg CR tablet Take 1 Tab by mouth every six (6) hours as needed for Pain. 30 Tab 2  promethazine (PHENERGAN) 25 mg tablet Take 1 Tab by mouth every six (6) hours as needed for Nausea. 20 Tab 0  
 folic acid (FOLVITE) 1 mg tablet Take 1 Tab by mouth daily. 90 Tab 1  cetirizine (ZYRTEC) 10 mg tablet Take 1 Tab by mouth daily. 30 Tab 11  
 calcitRIOL (ROCALTROL) 0.25 mcg capsule Take 1 Cap by mouth daily. 90 Cap 1  
 triamcinolone acetonide (KENALOG) 0.1 % ointment Apply  to affected area two (2) times a day. use thin layer 30 g 0  
 aspirin 81 mg tablet Take 81 mg by mouth. Allergies Allergen Reactions  Percocet [Oxycodone-Acetaminophen] Itching Family History Problem Relation Age of Onset  No Known Problems Mother  No Known Problems Father Social History Tobacco Use  Smoking status: Never Smoker  Smokeless tobacco: Never Used Substance Use Topics  Alcohol use: Yes Comment: social  
 
Patient Active Problem List  
Diagnosis Code  Anxiety F41.9  Hyperlipidemia E78.5  CAD (coronary artery disease) I25.10  Blindness H54.7  
 HTN (hypertension) I10  
 Osteopenia M85.80  
 B12 deficiency E53.8  Insomnia G47.00  Allergic rhinitis J30.9  Gastroesophageal reflux disease without esophagitis K21.9  Moderate episode of recurrent major depressive disorder (HCC) F33.1 Depression Risk Factor Screening: PHQ over the last two weeks 10/17/2017 PHQ Not Done Active Diagnosis of Depression or Bipolar Disorder Little interest or pleasure in doing things Several days Feeling down, depressed, irritable, or hopeless Not at all Total Score PHQ 2 1 Alcohol Risk Factor Screening: You do not drink alcohol or very rarely. Functional Ability and Level of Safety:  
Hearing Loss The patient needs further evaluation. Activities of Daily Living The home contains: [employer] bars Patient needs help with:  transportation and shopping Fall Risk Fall Risk Assessment, last 12 mths 1/25/2019 Able to walk? Yes Fall in past 12 months? No  
 
 
Abuse Screen Patient is not abused Cognitive Screening Evaluation of Cognitive Function: 
Has your family/caregiver stated any concerns about your memory: no 
Normal 
 
Patient Care Team  
Patient Care Team: 
Sharon Cedillo MD as PCP - [facility]) Patricia Hennessy MD (Cardiology) Claudette Crystal MD (Orthopedic Surgery) Assessment/Plan Education and counseling provided: 
Are appropriate based on today's review and evaluation End-of-Life planning (with patient's consent) Diagnoses and all orders for this visit: 
 
1. Medicare annual wellness visit, subsequent -     pneumococcal 13 kalina conj dip (PREVNAR-13) 0.5 mL syrg injection; 0.5 mL by IntraMUSCular route once for 1 dose. 
-     diphtheria-pertussis, acellular,-tetanus (ACELL) 2.5-8-5 Lf-mcg-Lf/0.5mL susp susp; 0.5 mL by IntraMUSCular route once for 1 dose. 2. Screening for depression 
-     [address] 3. Screening for alcoholism -     [place] ANNUAL ALCOHOL SCREEN 15 MIN There are no preventive care reminders to display for this patient.

## 2019-01-25 NOTE — PROGRESS NOTES
1. Have you been to the ER, urgent care clinic since your last visit? Hospitalized since your last visit? No 
 
2. Have you seen or consulted any other health care providers outside of the [address] [place] [address] since your last visit? Include any pap smears or colon screening. No 
Reviewed record in preparation for visit and have necessary documentation Pt did not bring medication to office visit for review 
opportunity was given for questions Goals that were addressed and/or need to be completed during or after this appointment include Health Maintenance Due Topic Date Due  
 DTaP/Tdap/Td series (1 - Tdap) 04/22/1973  Shingrix Vaccine Age 50> (1 of 2) 04/22/2002  GLAUCOMA SCREENING Q2Y  04/22/2017  Bone Densitometry (Dexa) Screening  04/22/2017  Pneumococcal 65+ Low/Medium Risk (1 of 2 - PCV13) 10/18/2017  BREAST CANCER SCRN MAMMOGRAM  05/20/2018  MEDICARE YEARLY EXAM  10/18/2018

## 2019-02-07 ENCOUNTER — TELEPHONE (OUTPATIENT)
Dept: FAMILY MEDICINE CLINIC | Age: 69
End: 2019-02-07

## 2019-02-07 DIAGNOSIS — M25.512 ACUTE PAIN OF LEFT SHOULDER: ICD-10-CM

## 2019-02-07 NOTE — TELEPHONE ENCOUNTER
----- Message from Lavon Martin sent at 2/7/2019 10:39 AM EST -----  Regarding: Dr. Nahun James   Pt is inquiring if she have the gabapentin 100 mg increased due to current acute issues and send over to the pharmacy [facility] [address] [address])[facility] has on file. Best contact number is [phone].

## 2019-02-08 RX ORDER — GABAPENTIN 100 MG/1
100 CAPSULE ORAL 3 TIMES DAILY
Qty: 90 CAP | Refills: 0 | Status: SHIPPED | OUTPATIENT
Start: 2019-02-08 | End: 2019-03-08

## 2019-02-18 DIAGNOSIS — F41.8 DEPRESSION WITH ANXIETY: ICD-10-CM

## 2019-02-18 DIAGNOSIS — Z76.0 ENCOUNTER FOR MEDICATION REFILL: ICD-10-CM

## 2019-02-19 RX ORDER — LORAZEPAM 1 MG/1
1 TABLET ORAL
Qty: 60 TAB | Refills: 0 | Status: SHIPPED | OUTPATIENT
Start: 2019-02-19 | End: 2019-04-18 | Stop reason: SDUPTHER

## 2019-03-01 ENCOUNTER — TELEPHONE (OUTPATIENT)
Dept: FAMILY MEDICINE CLINIC | Age: 69
End: 2019-03-01

## 2019-03-01 NOTE — TELEPHONE ENCOUNTER
Pt states she is going to the dentist on March 18th. They are going to extract 5 teeth. Does she need to get off her blood thinners? Also Pt discussed her right knee pain. She wants a Referral for a specialist please. The muscle relaxer is not helping. Please call Pt back.

## 2019-03-04 NOTE — TELEPHONE ENCOUNTER
No recent rx for a muscle relaxant. Gabapentin prescribed for acute shoulder pain. Stop ASA 5 days prior to dental procedure. Hold clopidagrel the day before and on the day of dental procedure. Restart after procedure.

## 2019-03-08 ENCOUNTER — OFFICE VISIT (OUTPATIENT)
Dept: FAMILY MEDICINE CLINIC | Age: 69
End: 2019-03-08

## 2019-03-08 VITALS
OXYGEN SATURATION: 96 % | DIASTOLIC BLOOD PRESSURE: 83 MMHG | BODY MASS INDEX: 31.1 KG/M2 | HEART RATE: 71 BPM | WEIGHT: 169 LBS | TEMPERATURE: 97.8 F | RESPIRATION RATE: 16 BRPM | HEIGHT: 62 IN | SYSTOLIC BLOOD PRESSURE: 145 MMHG

## 2019-03-08 DIAGNOSIS — I25.83 CORONARY ARTERY DISEASE DUE TO LIPID RICH PLAQUE: ICD-10-CM

## 2019-03-08 DIAGNOSIS — M25.512 ACUTE PAIN OF LEFT SHOULDER: ICD-10-CM

## 2019-03-08 DIAGNOSIS — I25.10 CORONARY ARTERY DISEASE DUE TO LIPID RICH PLAQUE: ICD-10-CM

## 2019-03-08 DIAGNOSIS — E78.5 HYPERLIPIDEMIA, UNSPECIFIED HYPERLIPIDEMIA TYPE: ICD-10-CM

## 2019-03-08 DIAGNOSIS — I10 ESSENTIAL HYPERTENSION: ICD-10-CM

## 2019-03-08 DIAGNOSIS — M25.561 RIGHT KNEE PAIN, UNSPECIFIED CHRONICITY: Primary | ICD-10-CM

## 2019-03-08 RX ORDER — GABAPENTIN 300 MG/1
300 CAPSULE ORAL
Qty: 90 CAP | Refills: 2 | Status: SHIPPED | OUTPATIENT
Start: 2019-03-08 | End: 2019-07-19 | Stop reason: SDUPTHER

## 2019-03-08 NOTE — PROGRESS NOTES
1. Have you been to the ER, urgent care clinic since your last visit? Hospitalized since your last visit? No    2. Have you seen or consulted any other health care providers outside of the [address] [place] [address] since your last visit? Include any pap smears or colon screening. No  Reviewed record in preparation for visit and have necessary documentation  Pt did not bring medication to office visit for review    Goals that were addressed and/or need to be completed during or after this appointment include   There are no preventive care reminders to display for this patient.

## 2019-03-08 NOTE — PATIENT INSTRUCTIONS
Keely Santo with [facility]  [address] [place] [address], [facility] [address] [facility], [facility]  [phone]    Monitor blood pressure outside the office several times weekly at different times during the day and evening. Bring the record to me in 3 weeks for review. Blood Pressure Record     Patient Name:  ______________________ :  ______________________    Date/Time BP Reading Pulse                                                                                                                                                                                                Home Blood Pressure Test: About This Test  What is it? A home blood pressure test allows you to keep track of your blood pressure at home. Blood pressure is a measure of the force of blood against the walls of your arteries. Blood pressure readings include two numbers, such as 130/80 (say \"130 over 80\"). The first number is the systolic pressure. The second number is the diastolic pressure. Why is this test done? You may do this test at home to:  · Find out if you have high blood pressure. · Track your blood pressure if you have high blood pressure. · Track how well medicine is working to reduce high blood pressure. · Check how lifestyle changes, such as weight loss and exercise, are affecting blood pressure. How can you prepare for the test?  · Do not use caffeine, tobacco, or medicines known to raise blood pressure (such as nasal decongestant sprays) for at least 30 minutes before taking your blood pressure. · Do not exercise for at least 30 minutes before taking your blood pressure. What happens before the test?  Take your blood pressure while you feel comfortable and relaxed.  Sit quietly with both feet on the floor for at least 5 minutes before the test.  What happens during the test?  · Sit with your arm slightly bent and resting on a table so that your upper arm is at the same level as your heart.  · Roll up your sleeve or take off your shirt to expose your upper arm. · Wrap the blood pressure cuff around your upper arm so that the lower edge of the cuff is about 1 inch above the bend of your elbow. Proceed with the following steps depending on if you are using an automatic or manual pressure monitor. Automatic blood pressure monitors  · Press the on/off button on the automatic monitor and wait until the ready-to-measure \"heart\" symbol appears next to zero in the display window. · Press the start button. The cuff will inflate and deflate by itself. · Your blood pressure numbers will appear on the screen. · Write your numbers in your log book, along with the date and time. Manual blood pressure monitors  · Place the earpieces of a stethoscope in your ears, and place the bell of the stethoscope over the artery, just below the cuff. · Close the valve on the rubber inflating bulb. · Squeeze the bulb rapidly with your opposite hand to inflate the cuff until the dial or column of mercury reads about 30 mm Hg higher than your usual systolic pressure. If you do not know your usual pressure, inflate the cuff to 210 mm Hg or until the pulse at your wrist disappears. · Open the pressure valve just slightly by twisting or pressing the valve on the bulb. · As you watch the pressure slowly fall, note the level on the dial at which you first start to hear a pulsing or tapping sound through the stethoscope. This is your systolic blood pressure. · Continue letting the air out slowly. The sounds will become muffled and will finally disappear. Note the pressure when the sounds completely disappear. This is your diastolic blood pressure. Let out all the remaining air. · Write your numbers in your log book, along with the date and time. What else should you know about the test?  It is more accurate to take the average of several readings made throughout the day than to rely on a single reading.  It's normal for blood pressure to go up and down throughout the day. Follow-up care is a key part of your treatment and safety. Be sure to make and go to all appointments, and call your doctor if you are having problems. It's also a good idea to keep a list of the medicines you take. Where can you learn more? Go to http://whitney-gage.info/. Enter C427 in the search box to learn more about \"Home Blood Pressure Test: About This Test.\"  Current as of: July 22, 2018  Content Version: 11.9  © 8985-2103 [employer]. Care instructions adapted under license by [employer] (which disclaims liability or warranty for this information). If you have questions about a medical condition or this instruction, always ask your healthcare professional. [address] any warranty or liability for your use of this information.

## 2019-03-09 LAB
ALBUMIN SERPL-MCNC: 4.6 G/DL (ref 3.6–4.8)
ALBUMIN/GLOB SERPL: 1.6 {RATIO} (ref 1.2–2.2)
ALP SERPL-CCNC: 78 IU/L (ref 39–117)
ALT SERPL-CCNC: 12 IU/L (ref 0–32)
AST SERPL-CCNC: 19 IU/L (ref 0–40)
BILIRUB SERPL-MCNC: 0.2 MG/DL (ref 0–1.2)
BUN SERPL-MCNC: 15 MG/DL (ref 8–27)
BUN/CREAT SERPL: 16 (ref 12–28)
CALCIUM SERPL-MCNC: 9.7 MG/DL (ref 8.7–10.3)
CHLORIDE SERPL-SCNC: 105 MMOL/L (ref 96–106)
CHOLEST SERPL-MCNC: 162 MG/DL (ref 100–199)
CO2 SERPL-SCNC: 26 MMOL/L (ref 20–29)
CREAT SERPL-MCNC: 0.93 MG/DL (ref 0.57–1)
ERYTHROCYTE [DISTWIDTH] IN BLOOD BY AUTOMATED COUNT: 12.5 % (ref 12.3–15.4)
GLOBULIN SER CALC-MCNC: 2.8 G/DL (ref 1.5–4.5)
GLUCOSE SERPL-MCNC: 84 MG/DL (ref 65–99)
HCT VFR BLD AUTO: 40.9 % (ref 34–46.6)
HDLC SERPL-MCNC: 53 MG/DL
HGB BLD-MCNC: 14.1 G/DL (ref 11.1–15.9)
LDLC SERPL CALC-MCNC: 85 MG/DL (ref 0–99)
MCH RBC QN AUTO: 32.6 PG (ref 26.6–33)
MCHC RBC AUTO-ENTMCNC: 34.5 G/DL (ref 31.5–35.7)
MCV RBC AUTO: 95 FL (ref 79–97)
PLATELET # BLD AUTO: 197 X10E3/UL (ref 150–379)
POTASSIUM SERPL-SCNC: 4.8 MMOL/L (ref 3.5–5.2)
PROT SERPL-MCNC: 7.4 G/DL (ref 6–8.5)
RBC # BLD AUTO: 4.33 X10E6/UL (ref 3.77–5.28)
SODIUM SERPL-SCNC: 147 MMOL/L (ref 134–144)
TRIGL SERPL-MCNC: 118 MG/DL (ref 0–149)
TSH SERPL DL<=0.005 MIU/L-ACNC: 1.89 UIU/ML (ref 0.45–4.5)
VLDLC SERPL CALC-MCNC: 24 MG/DL (ref 5–40)
WBC # BLD AUTO: 6.2 X10E3/UL (ref 3.4–10.8)

## 2019-03-11 ENCOUNTER — TELEPHONE (OUTPATIENT)
Dept: FAMILY MEDICINE CLINIC | Age: 69
End: 2019-03-11

## 2019-04-18 DIAGNOSIS — Z76.0 ENCOUNTER FOR MEDICATION REFILL: ICD-10-CM

## 2019-04-18 DIAGNOSIS — F41.8 DEPRESSION WITH ANXIETY: ICD-10-CM

## 2019-04-19 RX ORDER — LORAZEPAM 1 MG/1
1 TABLET ORAL
Qty: 60 TAB | Refills: 0 | Status: SHIPPED | OUTPATIENT
Start: 2019-04-19 | End: 2019-05-20 | Stop reason: SDUPTHER

## 2019-04-25 DIAGNOSIS — I25.83 CORONARY ARTERY DISEASE DUE TO LIPID RICH PLAQUE: ICD-10-CM

## 2019-04-25 DIAGNOSIS — K21.9 GASTROESOPHAGEAL REFLUX DISEASE WITHOUT ESOPHAGITIS: ICD-10-CM

## 2019-04-25 DIAGNOSIS — I25.10 CORONARY ARTERY DISEASE DUE TO LIPID RICH PLAQUE: ICD-10-CM

## 2019-04-26 RX ORDER — CLOPIDOGREL BISULFATE 75 MG/1
TABLET ORAL
Qty: 90 TAB | Refills: 0 | Status: SHIPPED | OUTPATIENT
Start: 2019-04-26 | End: 2019-08-13 | Stop reason: SDUPTHER

## 2019-04-26 RX ORDER — ESOMEPRAZOLE MAGNESIUM 40 MG/1
CAPSULE, DELAYED RELEASE ORAL
Qty: 90 CAP | Refills: 0 | Status: SHIPPED | OUTPATIENT
Start: 2019-04-26 | End: 2019-08-13 | Stop reason: SDUPTHER

## 2019-04-26 RX ORDER — ATENOLOL 50 MG/1
TABLET ORAL
Qty: 90 TAB | Refills: 0 | Status: SHIPPED | OUTPATIENT
Start: 2019-04-26 | End: 2019-08-13 | Stop reason: SDUPTHER

## 2019-05-20 DIAGNOSIS — F41.8 DEPRESSION WITH ANXIETY: ICD-10-CM

## 2019-05-20 DIAGNOSIS — Z76.0 ENCOUNTER FOR MEDICATION REFILL: ICD-10-CM

## 2019-05-22 RX ORDER — LORAZEPAM 1 MG/1
1 TABLET ORAL
Qty: 60 TAB | Refills: 0 | Status: SHIPPED | OUTPATIENT
Start: 2019-05-22 | End: 2019-06-17 | Stop reason: SDUPTHER

## 2019-06-17 DIAGNOSIS — F41.8 DEPRESSION WITH ANXIETY: ICD-10-CM

## 2019-06-17 DIAGNOSIS — Z76.0 ENCOUNTER FOR MEDICATION REFILL: ICD-10-CM

## 2019-06-17 NOTE — LETTER
6/18/2019 1:16 PM 
 
Ms. Cayla Cedillo 74 [address] [address] Dear Ms. Pamela Jacobson missed you! Please call our office at [phone] and schedule a follow up appointment for your continued care. Please call to schedule an appointment before any further refills of medication. Thank you. Sincerely, Ann Perry MD

## 2019-06-18 RX ORDER — LORAZEPAM 1 MG/1
1 TABLET ORAL
Qty: 60 TAB | Refills: 0 | Status: SHIPPED | OUTPATIENT
Start: 2019-06-18 | End: 2019-07-19 | Stop reason: SDUPTHER

## 2019-07-19 DIAGNOSIS — Z76.0 ENCOUNTER FOR MEDICATION REFILL: ICD-10-CM

## 2019-07-19 DIAGNOSIS — F41.8 DEPRESSION WITH ANXIETY: ICD-10-CM

## 2019-07-19 DIAGNOSIS — M25.512 ACUTE PAIN OF LEFT SHOULDER: ICD-10-CM

## 2019-07-19 DIAGNOSIS — M25.561 RIGHT KNEE PAIN, UNSPECIFIED CHRONICITY: ICD-10-CM

## 2019-07-19 RX ORDER — GABAPENTIN 300 MG/1
300 CAPSULE ORAL
Qty: 90 CAP | Refills: 2 | Status: SHIPPED | OUTPATIENT
Start: 2019-07-19 | End: 2019-08-13 | Stop reason: SDUPTHER

## 2019-07-19 RX ORDER — LORAZEPAM 1 MG/1
1 TABLET ORAL
Qty: 60 TAB | Refills: 0 | Status: SHIPPED | OUTPATIENT
Start: 2019-07-19 | End: 2019-08-13 | Stop reason: SDUPTHER

## 2019-08-09 ENCOUNTER — TELEPHONE (OUTPATIENT)
Dept: FAMILY MEDICINE CLINIC | Age: 69
End: 2019-08-09

## 2019-08-09 NOTE — TELEPHONE ENCOUNTER
Fariha Quintero, [address]             General Message/Vendor Calls     Caller's first and last name:April with [employer]     Reason for call:April requesting for an order for home health to be completed at appointment on 08/13/19 due to needing help with daily duties. April stated the pt vision has not been getting better.        Callback required yes/no and why:No       Best contact number(s): [phone]       Details to clarify the request:       Sheldon Marks

## 2019-08-11 NOTE — PROGRESS NOTES
Subjective  CC: Riri Boykin is an 81 y.o. female presents for sleep disturbance and trouble sleeping    Nausea and vomiting  Numbness in right shoulder with nausea and vomiting for past 3 days. She states the nausea is present when she wakes or randomly throughout the day. She thinks its due to the depression. She states that she does not feel like she has much support since she lost her brother, has lost one of her dogs, recently broke up with her  of 11 years, and now reports her vision has changed to where she cannot see images anymore (she is legally blind). Denies any fever, CP, LE swelling, or SOB. Pt states she is having trouble sleeping. Reports she goes to bed at midnight and wakes at 0300 thinking it is time to wake up (cannot tell day from night). Requesting something stronger for sleep. States she takes Lorazepam and gabapentin at bedtime. Reports trazodone made her nausea. Depression  Feels sad with things that have been occurring over the past year (as above mentioned). She states she can talk to people over the phone. Has limited transportation so not interested in going to talk therapy at this time. Today pt denies dysuria, HA, skin changes, changes in bowel or bladder function, denies abdominal pain, or changes in appetite. Endorses sad mood. Allergies - reviewed: Allergies   Allergen Reactions    Percocet [Oxycodone-Acetaminophen] Itching         Medications - reviewed:   Current Outpatient Medications   Medication Sig    atenolol (TENORMIN) 50 mg tablet TAKE ONE TABLET BY MOUTH DAILY    gabapentin (NEURONTIN) 300 mg capsule Take 1 Cap by mouth three (3) times daily as needed for Pain.  LORazepam (ATIVAN) 1 mg tablet Take 1 Tab by mouth every eight (8) hours as needed for Anxiety.     clopidogrel (PLAVIX) 75 mg tab TAKE ONE TABLET BY MOUTH EVERY DAY    esomeprazole (NEXIUM) 40 mg capsule TAKE ONE CAPSULE BY MOUTH EVERY DAY    simvastatin (ZOCOR) 40 mg tablet TAKE ONE TABLET BY MOUTH NIGHTLY    buPROPion XL (WELLBUTRIN XL) 150 mg tablet TAKE ONE TABLET BY MOUTH EVERY MORNING    miscellaneous medical supply (BLOOD PRESSURE CUFF) misc Blood Pressure Cuff That Talks.  furosemide (LASIX) 20 mg tablet TAKE ONE TABLET BY MOUTH EVERY DAY    potassium chloride SA (MICRO-K) 10 mEq capsule TAKE ONE CAPSULE BY MOUTH TWICE A DAY    fluticasone (FLONASE) 50 mcg/actuation nasal spray USE 2 SPRAYS IN BOTH NOSTRILS DAILY    aspirin 81 mg tablet Take 81 mg by mouth.  raNITIdine (ZANTAC) 150 mg tablet TAKE 1 TABLET BY MOUTH NIGHTLY    traZODone (DESYREL) 50 mg tablet TAKE ONE TABLET BY MOUTH NIGHTLY  Indications: insomnia associated with depression    albuterol (PROVENTIL HFA, VENTOLIN HFA, PROAIR HFA) 90 mcg/actuation inhaler Take 2 Puffs by inhalation every four (4) hours as needed for Wheezing.  nitroglycerin (NITROSTAT) 0.4 mg SL tablet 1 Tab by SubLINGual route as needed for Chest Pain.  cyanocobalamin 1,000 mcg tablet Take 1 Tab by mouth daily.  lidocaine (XYLOCAINE) 4 % topical cream Apply  to affected area four (4) times daily as needed for Pain.  acetaminophen (TYLENOL ARTHRITIS PAIN) 650 mg CR tablet Take 1 Tab by mouth every six (6) hours as needed for Pain.  promethazine (PHENERGAN) 25 mg tablet Take 1 Tab by mouth every six (6) hours as needed for Nausea.  folic acid (FOLVITE) 1 mg tablet Take 1 Tab by mouth daily.  cetirizine (ZYRTEC) 10 mg tablet Take 1 Tab by mouth daily.  calcitRIOL (ROCALTROL) 0.25 mcg capsule Take 1 Cap by mouth daily.  triamcinolone acetonide (KENALOG) 0.1 % ointment Apply  to affected area two (2) times a day. use thin layer     No current facility-administered medications for this visit.           Past Medical History - reviewed:  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         Immunizations - reviewed:   Immunization History   Administered Date(s) Administered    Influenza High Dose Vaccine PF 10/18/2017    Influenza Vaccine 10/16/2013    Influenza Vaccine (Tri) Adjuvanted 09/18/2018    Influenza Vaccine Split 10/04/2012    Pneumococcal Conjugate (PCV-13) 01/29/2019    Pneumococcal Polysaccharide (PPSV-23) 10/18/2016    Zoster Vaccine, Live 08/29/2017         ROS  Review of Systems : A review of systems was performed. All pertinent negatives and positives are mentioned in the HPI. Physical Exam  Visit Vitals  /71 (BP 1 Location: Right arm, BP Patient Position: Sitting)   Pulse 63   Temp 97.1 °F (36.2 °C) (Oral)   Resp 20   Ht 5' 2\" (1.575 m)   Wt 166 lb (75.3 kg)   SpO2 95%   BMI 30.36 kg/m²       General appearance - Alert, NAD. Head: Atraumatic. Normocephalic. No lymphadenopathy  Eyes: EOMI. Sclera white. Ears: Hearing grossly normal. TM non erythematous with no effusion   Nose: Nares patent, no polyps  Neck: No cervical lymphadenopathy or goiter pesent  Throat: pharynx clear, no exudates. Respiratory - LCTAB. No wheeze/rale/rhonchi  Heart - Normal rate, regular rhythm. No m/r/r  Abdomen - Soft, non tender. Non distended. Neurological - No focal deficits. Speech normal.   Musculoskeletal - Normal ROM, Gait normal.    Extremities - No LE edema. Distal pulses intact  Skin - normal coloration and normal turgor. No cyanosis, no rash. Assessment/Plan  1. Coronary artery disease due to lipid rich plaque - Lipids well controlled on current regimen per lab ordered on 3/8/19. ECG in office  Per below. - AMB POC EKG ROUTINE W/ 12 LEADS, INTER & REP    2. Blindness - pt having difficulties managing medications. Will place referral to home health to help with medication administration.  - REFERRAL TO HOME HEALTH    3. Nausea and vomiting, intractability of vomiting not specified, unspecified vomiting type - chronic.  Pt has been on promethazine in the past. ECG with t-wave inversion in V1 and V2 likely from previous MI. Pt is s/p CAGB for 3 vessel disease. No prior studies for comparison. Pt without CP today. QTc normal.  - AMB POC EKG ROUTINE W/ 12 LEADS, INTER & REP  - Zofran 4mg q6 PRN    4. Circadian rhythm sleep disturbance - will prescribe mirtazapine since pt states trazodone made her nausea. - Mirtazapine qhs PRN  - STOP trazodone  - RTC with PCP on 8/16/19 for follow-up           I have discussed the aforementioned diagnoses and plan with the patient in detail. I have provided information in person and/or in AVS. All questions answered prior to discharge.     Gildardo Childress MD  Family Medicine Resident

## 2019-08-13 ENCOUNTER — OFFICE VISIT (OUTPATIENT)
Dept: FAMILY MEDICINE CLINIC | Age: 69
End: 2019-08-13

## 2019-08-13 VITALS
HEIGHT: 62 IN | HEART RATE: 63 BPM | RESPIRATION RATE: 20 BRPM | BODY MASS INDEX: 30.55 KG/M2 | WEIGHT: 166 LBS | SYSTOLIC BLOOD PRESSURE: 125 MMHG | DIASTOLIC BLOOD PRESSURE: 71 MMHG | OXYGEN SATURATION: 95 % | TEMPERATURE: 97.1 F

## 2019-08-13 DIAGNOSIS — H54.7 BLINDNESS: ICD-10-CM

## 2019-08-13 DIAGNOSIS — I10 ESSENTIAL HYPERTENSION: ICD-10-CM

## 2019-08-13 DIAGNOSIS — I25.10 CORONARY ARTERY DISEASE DUE TO LIPID RICH PLAQUE: ICD-10-CM

## 2019-08-13 DIAGNOSIS — I25.83 CORONARY ARTERY DISEASE DUE TO LIPID RICH PLAQUE: ICD-10-CM

## 2019-08-13 DIAGNOSIS — M25.512 ACUTE PAIN OF LEFT SHOULDER: ICD-10-CM

## 2019-08-13 DIAGNOSIS — F41.8 DEPRESSION WITH ANXIETY: ICD-10-CM

## 2019-08-13 DIAGNOSIS — M25.561 RIGHT KNEE PAIN, UNSPECIFIED CHRONICITY: ICD-10-CM

## 2019-08-13 DIAGNOSIS — G47.20 CIRCADIAN RHYTHM SLEEP DISTURBANCE: ICD-10-CM

## 2019-08-13 DIAGNOSIS — R11.2 NAUSEA AND VOMITING, INTRACTABILITY OF VOMITING NOT SPECIFIED, UNSPECIFIED VOMITING TYPE: Primary | ICD-10-CM

## 2019-08-13 DIAGNOSIS — K21.9 GASTROESOPHAGEAL REFLUX DISEASE WITHOUT ESOPHAGITIS: ICD-10-CM

## 2019-08-13 DIAGNOSIS — Z76.0 ENCOUNTER FOR MEDICATION REFILL: ICD-10-CM

## 2019-08-13 RX ORDER — ONDANSETRON 4 MG/1
4 TABLET, ORALLY DISINTEGRATING ORAL
Qty: 30 TAB | Refills: 0 | Status: SHIPPED | OUTPATIENT
Start: 2019-08-13 | End: 2020-06-09

## 2019-08-13 RX ORDER — MIRTAZAPINE 7.5 MG/1
7.5 TABLET, FILM COATED ORAL
Qty: 30 TAB | Refills: 0 | Status: SHIPPED | OUTPATIENT
Start: 2019-08-13 | End: 2019-10-14 | Stop reason: SDUPTHER

## 2019-08-13 NOTE — PROGRESS NOTES
1. Have you been to the ER, urgent care clinic since your last visit? Hospitalized since your last visit? No    2. Have you seen or consulted any other health care providers outside of the [address] [place] [address] since your last visit? Include any pap smears or colon screening.  No  Reviewed record in preparation for visit and have necessary documentation  Pt did not bring medication to office visit for review      Goals that were addressed and/or need to be completed during or after this appointment include   Health Maintenance Due   Topic Date Due    Influenza Age 5 to Adult  08/01/2019

## 2019-08-13 NOTE — PATIENT INSTRUCTIONS
START Mirtazapine 7.5mg 30 minutes before bedtime. START Zofran every 6 hours as needed for nausea. Keep appointment on 19 at 9:40a for follow-up on the nausea and sleep problems and to follow-up chronic conditions. [facility]  [address] [place] [address],  [address] [facility], [address] [place] [address]  [phone]    Monitor blood pressure outside the office several times weekly at different times during the day and evening. Bring the record to me in 3 weeks for review. Blood Pressure Record     Patient Name:  ______________________ :  ______________________    Date/Time BP Reading Pulse                                                                                                                                                                                                Home Blood Pressure Test: About This Test  What is it? A home blood pressure test allows you to keep track of your blood pressure at home. Blood pressure is a measure of the force of blood against the walls of your arteries. Blood pressure readings include two numbers, such as 130/80 (say \"130 over 80\"). The first number is the systolic pressure. The second number is the diastolic pressure. Why is this test done? You may do this test at home to:  · Find out if you have high blood pressure. · Track your blood pressure if you have high blood pressure. · Track how well medicine is working to reduce high blood pressure. · Check how lifestyle changes, such as weight loss and exercise, are affecting blood pressure. How can you prepare for the test?  · Do not use caffeine, tobacco, or medicines known to raise blood pressure (such as nasal decongestant sprays) for at least 30 minutes before taking your blood pressure. · Do not exercise for at least 30 minutes before taking your blood pressure.   What happens before the test?  Take your blood pressure while you feel comfortable and relaxed. Sit quietly with both feet on the floor for at least 5 minutes before the test.  What happens during the test?  · Sit with your arm slightly bent and resting on a table so that your upper arm is at the same level as your heart. · Roll up your sleeve or take off your shirt to expose your upper arm. · Wrap the blood pressure cuff around your upper arm so that the lower edge of the cuff is about 1 inch above the bend of your elbow. Proceed with the following steps depending on if you are using an automatic or manual pressure monitor. Automatic blood pressure monitors  · Press the on/off button on the automatic monitor and wait until the ready-to-measure \"heart\" symbol appears next to zero in the display window. · Press the start button. The cuff will inflate and deflate by itself. · Your blood pressure numbers will appear on the screen. · Write your numbers in your log book, along with the date and time. Manual blood pressure monitors  · Place the earpieces of a stethoscope in your ears, and place the bell of the stethoscope over the artery, just below the cuff. · Close the valve on the rubber inflating bulb. · Squeeze the bulb rapidly with your opposite hand to inflate the cuff until the dial or column of mercury reads about 30 mm Hg higher than your usual systolic pressure. If you do not know your usual pressure, inflate the cuff to 210 mm Hg or until the pulse at your wrist disappears. · Open the pressure valve just slightly by twisting or pressing the valve on the bulb. · As you watch the pressure slowly fall, note the level on the dial at which you first start to hear a pulsing or tapping sound through the stethoscope. This is your systolic blood pressure. · Continue letting the air out slowly. The sounds will become muffled and will finally disappear. Note the pressure when the sounds completely disappear. This is your diastolic blood pressure. Let out all the remaining air.   · Write your numbers in your log book, along with the date and time. What else should you know about the test?  It is more accurate to take the average of several readings made throughout the day than to rely on a single reading. It's normal for blood pressure to go up and down throughout the day. Follow-up care is a key part of your treatment and safety. Be sure to make and go to all appointments, and call your doctor if you are having problems. It's also a good idea to keep a list of the medicines you take. Where can you learn more? Go to http://whitney-gage.info/. Enter C427 in the search box to learn more about \"Home Blood Pressure Test: About This Test.\"  Current as of: July 22, 2018  Content Version: 12.1  © 3588-7210 [employer]. Care instructions adapted under license by [employer] (which disclaims liability or warranty for this information). If you have questions about a medical condition or this instruction, always ask your healthcare professional. [address] any warranty or liability for your use of this information.

## 2019-08-14 RX ORDER — LANOLIN ALCOHOL/MO/W.PET/CERES
1000 CREAM (GRAM) TOPICAL DAILY
Qty: 90 TAB | Refills: 1 | Status: SHIPPED | OUTPATIENT
Start: 2019-08-14 | End: 2020-03-12

## 2019-08-14 RX ORDER — FLUTICASONE PROPIONATE 50 MCG
SPRAY, SUSPENSION (ML) NASAL
Qty: 16 G | Refills: 2 | Status: SHIPPED | OUTPATIENT
Start: 2019-08-14 | End: 2020-10-20 | Stop reason: SDUPTHER

## 2019-08-14 RX ORDER — CLOPIDOGREL BISULFATE 75 MG/1
TABLET ORAL
Qty: 90 TAB | Refills: 0 | Status: SHIPPED | OUTPATIENT
Start: 2019-08-14 | End: 2020-01-23

## 2019-08-14 RX ORDER — BUPROPION HYDROCHLORIDE 150 MG/1
150 TABLET ORAL
Qty: 90 TAB | Refills: 0 | Status: SHIPPED | OUTPATIENT
Start: 2019-08-14 | End: 2019-10-21 | Stop reason: SDUPTHER

## 2019-08-14 RX ORDER — ESOMEPRAZOLE MAGNESIUM 40 MG/1
CAPSULE, DELAYED RELEASE ORAL
Qty: 90 CAP | Refills: 0 | Status: SHIPPED | OUTPATIENT
Start: 2019-08-14 | End: 2020-03-12 | Stop reason: SDUPTHER

## 2019-08-14 RX ORDER — GABAPENTIN 300 MG/1
300 CAPSULE ORAL
Qty: 90 CAP | Refills: 0 | Status: SHIPPED | OUTPATIENT
Start: 2019-08-14 | End: 2020-01-30 | Stop reason: SDUPTHER

## 2019-08-14 RX ORDER — LORAZEPAM 1 MG/1
1 TABLET ORAL
Qty: 60 TAB | Refills: 0 | Status: SHIPPED | OUTPATIENT
Start: 2019-08-14 | End: 2019-09-19 | Stop reason: SDUPTHER

## 2019-08-14 RX ORDER — ATENOLOL 50 MG/1
50 TABLET ORAL DAILY
Qty: 90 TAB | Refills: 0 | Status: SHIPPED | OUTPATIENT
Start: 2019-08-14 | End: 2020-03-12 | Stop reason: SDUPTHER

## 2019-08-14 RX ORDER — FUROSEMIDE 20 MG/1
TABLET ORAL
Qty: 90 TAB | Refills: 0 | Status: SHIPPED | OUTPATIENT
Start: 2019-08-14 | End: 2020-09-04 | Stop reason: SDUPTHER

## 2019-08-16 ENCOUNTER — TELEPHONE (OUTPATIENT)
Dept: FAMILY MEDICINE CLINIC | Age: 69
End: 2019-08-16

## 2019-08-16 NOTE — TELEPHONE ENCOUNTER
Patient referred to home health. Request along with copy of insurance card faxed to [facility]. They checked with the insurance company and were informed that the patient has to meet her deductible which she has not for the year and after that she has a copay of 40%. Called the patient and she told me to call Virginia Campos at [facility]. Left message for April to call back.

## 2019-09-19 DIAGNOSIS — F41.8 DEPRESSION WITH ANXIETY: ICD-10-CM

## 2019-09-19 DIAGNOSIS — Z76.0 ENCOUNTER FOR MEDICATION REFILL: ICD-10-CM

## 2019-09-19 RX ORDER — LORAZEPAM 1 MG/1
1 TABLET ORAL
Qty: 60 TAB | Refills: 0 | Status: SHIPPED | OUTPATIENT
Start: 2019-09-19 | End: 2019-10-16 | Stop reason: SDUPTHER

## 2019-10-14 DIAGNOSIS — G47.20 CIRCADIAN RHYTHM SLEEP DISTURBANCE: ICD-10-CM

## 2019-10-14 RX ORDER — MIRTAZAPINE 7.5 MG/1
TABLET, FILM COATED ORAL
Qty: 30 TAB | Refills: 0 | Status: SHIPPED | OUTPATIENT
Start: 2019-10-14 | End: 2020-01-20

## 2019-10-16 DIAGNOSIS — Z76.0 ENCOUNTER FOR MEDICATION REFILL: ICD-10-CM

## 2019-10-16 DIAGNOSIS — F41.8 DEPRESSION WITH ANXIETY: ICD-10-CM

## 2019-10-16 NOTE — TELEPHONE ENCOUNTER
Pt states she called this medication in the same time as the other two. Received the other two but not this one. It has no refills. Only has one pill left. Called in on Monday.  Thanks

## 2019-10-17 RX ORDER — LORAZEPAM 1 MG/1
1 TABLET ORAL
Qty: 60 TAB | Refills: 0 | Status: SHIPPED | OUTPATIENT
Start: 2019-10-17 | End: 2019-11-14 | Stop reason: SDUPTHER

## 2019-11-14 ENCOUNTER — TELEPHONE (OUTPATIENT)
Dept: FAMILY MEDICINE CLINIC | Age: 69
End: 2019-11-14

## 2019-11-14 ENCOUNTER — HOSPITAL ENCOUNTER (OUTPATIENT)
Dept: LAB | Age: 69
Discharge: HOME | End: 2019-11-14

## 2019-11-14 ENCOUNTER — OFFICE VISIT (OUTPATIENT)
Dept: FAMILY MEDICINE CLINIC | Age: 69
End: 2019-11-14

## 2019-11-14 VITALS
RESPIRATION RATE: 18 BRPM | HEART RATE: 62 BPM | TEMPERATURE: 97.7 F | DIASTOLIC BLOOD PRESSURE: 69 MMHG | OXYGEN SATURATION: 97 % | WEIGHT: 170 LBS | SYSTOLIC BLOOD PRESSURE: 128 MMHG | HEIGHT: 62 IN | BODY MASS INDEX: 31.28 KG/M2

## 2019-11-14 DIAGNOSIS — E78.5 HYPERLIPIDEMIA, UNSPECIFIED HYPERLIPIDEMIA TYPE: ICD-10-CM

## 2019-11-14 DIAGNOSIS — I10 ESSENTIAL HYPERTENSION: ICD-10-CM

## 2019-11-14 DIAGNOSIS — F41.8 DEPRESSION WITH ANXIETY: Primary | ICD-10-CM

## 2019-11-14 DIAGNOSIS — H54.3 BLIND IN BOTH EYES: ICD-10-CM

## 2019-11-14 DIAGNOSIS — K64.9 HEMORRHOIDS, UNSPECIFIED HEMORRHOID TYPE: ICD-10-CM

## 2019-11-14 LAB
ALBUMIN SERPL-MCNC: 4 G/DL (ref 3.5–5)
ALBUMIN/GLOB SERPL: 1.1 {RATIO} (ref 1.1–2.2)
ALP SERPL-CCNC: 80 U/L (ref 45–117)
ALT SERPL-CCNC: 21 U/L (ref 12–78)
ANION GAP SERPL CALC-SCNC: 4 MMOL/L (ref 5–15)
AST SERPL-CCNC: 17 U/L (ref 15–37)
BILIRUB SERPL-MCNC: 0.4 MG/DL (ref 0.2–1)
BUN SERPL-MCNC: 16 MG/DL (ref 6–20)
BUN/CREAT SERPL: 16 (ref 12–20)
CALCIUM SERPL-MCNC: 9 MG/DL (ref 8.5–10.1)
CHLORIDE SERPL-SCNC: 109 MMOL/L (ref 97–108)
CHOLEST SERPL-MCNC: 172 MG/DL
CO2 SERPL-SCNC: 30 MMOL/L (ref 21–32)
CREAT SERPL-MCNC: 1.01 MG/DL (ref 0.55–1.02)
GLOBULIN SER CALC-MCNC: 3.5 G/DL (ref 2–4)
GLUCOSE SERPL-MCNC: 94 MG/DL (ref 65–100)
HCT VFR BLD AUTO: 44.2 % (ref 35–47)
HDLC SERPL-MCNC: 53 MG/DL
HDLC SERPL: 3.2 {RATIO} (ref 0–5)
HGB BLD-MCNC: 14.1 G/DL (ref 11.5–16)
LDLC SERPL CALC-MCNC: 95.6 MG/DL (ref 0–100)
LIPID PROFILE,FLP: NORMAL
POTASSIUM SERPL-SCNC: 4.6 MMOL/L (ref 3.5–5.1)
PROT SERPL-MCNC: 7.5 G/DL (ref 6.4–8.2)
SODIUM SERPL-SCNC: 143 MMOL/L (ref 136–145)
TRIGL SERPL-MCNC: 117 MG/DL (ref ?–150)
TSH SERPL DL<=0.05 MIU/L-ACNC: 1.91 UIU/ML (ref 0.36–3.74)
VLDLC SERPL CALC-MCNC: 23.4 MG/DL

## 2019-11-14 RX ORDER — HYDROCORTISONE ACETATE 25 MG/1
25 SUPPOSITORY RECTAL EVERY 12 HOURS
Qty: 24 EACH | Refills: 0 | Status: SHIPPED | OUTPATIENT
Start: 2019-11-14 | End: 2019-11-15

## 2019-11-14 RX ORDER — LORAZEPAM 1 MG/1
1 TABLET ORAL
Qty: 60 TAB | Refills: 0 | Status: SHIPPED | OUTPATIENT
Start: 2019-11-14 | End: 2019-12-13 | Stop reason: SDUPTHER

## 2019-11-14 NOTE — PROGRESS NOTES
1. Have you been to the ER, urgent care clinic since your last visit? Hospitalized since your last visit? No    2. Have you seen or consulted any other health care providers outside of the [address] [place] [address] since your last visit? Include any pap smears or colon screening. No  Reviewed record in preparation for visit and have necessary documentation  Pt did not bring medication to office visit for review  opportunity was given for questions  Goals that were addressed and/or need to be completed during or after this appointment include  There are no preventive care reminders to display for this patient.

## 2019-11-15 ENCOUNTER — TELEPHONE (OUTPATIENT)
Dept: FAMILY MEDICINE CLINIC | Age: 69
End: 2019-11-15

## 2019-11-15 NOTE — TELEPHONE ENCOUNTER
Please send in alternative suppository for hemorrhoids. Hydrocortisone supp was not covered by insurance. Thanks.

## 2019-11-15 NOTE — PROGRESS NOTES
Call patient: Kidney function decreased. Please increase water consumption and we will recheck at next appointment.

## 2019-11-15 NOTE — TELEPHONE ENCOUNTER
----- Message from Agustin Louis MD sent at 11/15/2019  8:13 AM EST -----  Call patient: Kidney function decreased. Please increase water consumption and we will recheck at next appointment.

## 2019-11-20 NOTE — PATIENT INSTRUCTIONS
Hemorrhoids: Care Instructions Your Care Instructions Hemorrhoids are enlarged veins that develop in the anal canal. Bleeding during bowel movements, itching, swelling, and rectal pain are the most common symptoms. They can be uncomfortable at times, but hemorrhoids rarely are a serious problem. You can treat most hemorrhoids with simple changes to your diet and bowel habits. These changes include eating more fiber and not straining to pass stools. Most hemorrhoids do not need surgery or other treatment unless they are very large and painful or bleed a lot. Follow-up care is a key part of your treatment and safety. Be sure to make and go to all appointments, and call your doctor if you are having problems. It's also a good idea to know your test results and keep a list of the medicines you take. How can you care for yourself at home? · Sit in a few inches of warm water (sitz bath) 3 times a day and after bowel movements. The warm water helps with pain and itching. · Put ice on your anal area several times a day for 10 minutes at a time. Put a thin cloth between the ice and your skin. Follow this by placing a warm, wet towel on the area for another 10 to 20 minutes. · Take pain medicines exactly as directed. ? If the doctor gave you a prescription medicine for pain, take it as prescribed. ? If you are not taking a prescription pain medicine, ask your doctor if you can take an over-the-counter medicine. · Keep the anal area clean, but be gentle. Use water and a fragrance-free soap, such as [place], or use baby wipes or medicated pads, such as Tucks. · Wear cotton underwear and loose clothing to decrease moisture in the anal area. · Eat more fiber. Include foods such as whole-grain breads and cereals, raw vegetables, raw and dried fruits, and beans. · Drink plenty of fluids, enough so that your urine is light yellow or clear like water.  If you have kidney, heart, or liver disease and have to limit fluids, talk with your doctor before you increase the amount of fluids you drink. · Use a stool softener that contains bran or psyllium. You can save money by buying bran or psyllium (available in bulk at most health food stores) and sprinkling it on foods or stirring it into fruit juice. Or you can use a product such as Metamucil or Hydrocil. · Practice healthy bowel habits. ? Go to the bathroom as soon as you have the urge. ? Avoid straining to pass stools. Relax and give yourself time to let things happen naturally. ? Do not hold your breath while passing stools. ? Do not read while sitting on the toilet. Get off the toilet as soon as you have finished. · Take your medicines exactly as prescribed. Call your doctor if you think you are having a problem with your medicine. When should you call for help? Call 911 anytime you think you may need emergency care. For example, call if: 
  · You pass maroon or very bloody stools.  
 Call your doctor now or seek immediate medical care if: 
  · You have increased pain.  
  · You have increased bleeding.  
 Watch closely for changes in your health, and be sure to contact your doctor if: 
  · Your symptoms have not improved after 3 or 4 days. Where can you learn more? Go to http://whitney-gage.info/. Enter F228 in the search box to learn more about \"Hemorrhoids: Care Instructions. \" Current as of: November 7, 2018 Content Version: 12.2 © 6907-7229 [employer]. Care instructions adapted under license by [employer] (which disclaims liability or warranty for this information). If you have questions about a medical condition or this instruction, always ask your healthcare professional. [place] [address] any warranty or liability for your use of this information.

## 2019-11-20 NOTE — PROGRESS NOTES
Chief Complaint   Patient presents with    Hypertension    Hemorrhoids    Medication Refill     she is a 81y.o. year old female who presents for follow up of her chronic medical conditions. Patient with hx of CAD, HTN, HLD, GERD, B12 deficiency, blindness, anxiety and depression. Patient denies HA, dizziness, SOB, CP, abdominal pain, dysuria. She complains of hemorrhoids. Hypertension:  The patient reports: Has run out of medication, no medication side effects noted, no TIA's, no chest pain on exertion, no dyspnea on exertion, no swelling of ankles. Lifestyle modification/social history: sedentary     BP Readings from Last 3 Encounters:   11/14/19 128/69   08/13/19 125/71   03/08/19 145/83     Lab Results   Component Value Date/Time    Sodium 143 11/14/2019 11:37 AM    Potassium 4.6 11/14/2019 11:37 AM    Chloride 109 (H) 11/14/2019 11:37 AM    CO2 30 11/14/2019 11:37 AM    Anion gap 4 (L) 11/14/2019 11:37 AM    Glucose 94 11/14/2019 11:37 AM    BUN 16 11/14/2019 11:37 AM    Creatinine 1.01 11/14/2019 11:37 AM    BUN/Creatinine ratio 16 11/14/2019 11:37 AM    GFR est AA >60 11/14/2019 11:37 AM    GFR est non-AA 55 (L) 11/14/2019 11:37 AM    Calcium 9.0 11/14/2019 11:37 AM    Bilirubin, total 0.4 11/14/2019 11:37 AM    ALT (SGPT) 21 11/14/2019 11:37 AM    AST (SGOT) 17 11/14/2019 11:37 AM    Alk. phosphatase 80 11/14/2019 11:37 AM    Protein, total 7.5 11/14/2019 11:37 AM    Albumin 4.0 11/14/2019 11:37 AM    Globulin 3.5 11/14/2019 11:37 AM    A-G Ratio 1.1 11/14/2019 11:37 AM          Patient advised to log blood pressures at home 3-5 times monthly and bring to next visit. Call office as soon as possible if BP's over 140/90 on multiple occasions or with symptoms of dizziness, chest pain, shortness of breath, headache or ankle swelling. Our goal is to normalize the blood pressure to decrease the risks of strokes and heart attacks. The patient is in agreement with the plan.     Hyperlipidemia Cardiovascular risks for her are: Age, HTN, CAD    Lab Results   Component Value Date/Time    Cholesterol, total 172 11/14/2019 11:37 AM    HDL Cholesterol 53 11/14/2019 11:37 AM    LDL, calculated 95.6 11/14/2019 11:37 AM    Triglyceride 117 11/14/2019 11:37 AM    CHOL/HDL Ratio 3.2 11/14/2019 11:37 AM     Our goal is to lower hyperlipidemia to decrease the risks of strokes and heart attacks      Patient Active Problem List   Diagnosis Code    Anxiety F41.9    Hyperlipidemia E78.5    CAD (coronary artery disease) I25.10    Blindness H54.7    HTN (hypertension) I10    Osteopenia M85.80    B12 deficiency E53.8    Insomnia G47.00    Allergic rhinitis J30.9    Gastroesophageal reflux disease without esophagitis K21.9    Moderate episode of recurrent major depressive disorder ([facility] [address] F33.1     Past Surgical History:   Procedure Laterality Date    CABG, VEIN, FIVE  2006    CARDIAC SURG PROCEDURE UNLIST      HX ORTHOPAEDIC       Social History     Socioeconomic History    Marital status:      Spouse name: Not on file    Number of children: Not on file    Years of education: Not on file    Highest education level: Not on file   Occupational History    Not on file   Social Needs    Financial resource strain: Not on file    Food insecurity:     Worry: Not on file     Inability: Not on file    Transportation needs:     Medical: Not on file     Non-medical: Not on file   Tobacco Use    Smoking status: Never Smoker    Smokeless tobacco: Never Used   Substance and Sexual Activity    Alcohol use: Yes     Comment: social    Drug use: No    Sexual activity: Yes     Partners: Male     Birth control/protection: None   Lifestyle    Physical activity:     Days per week: Not on file     Minutes per session: Not on file    Stress: Not on file   Relationships    Social connections:     Talks on phone: Not on file     Gets together: Not on file     Attends Jain service: Not on file     Active member of club or organization: Not on file     Attends meetings of clubs or organizations: Not on file     Relationship status: Not on file    Intimate partner violence:     Fear of current or ex partner: Not on file     Emotionally abused: Not on file     Physically abused: Not on file     Forced sexual activity: Not on file   Other Topics Concern    Not on file   Social History Narrative    Not on file     Family History   Problem Relation Age of Onset    No Known Problems Mother     No Known Problems Father      Current Outpatient Medications   Medication Sig    LORazepam (ATIVAN) 1 mg tablet Take 1 Tab by mouth every eight (8) hours as needed for Anxiety.  buPROPion XL (WELLBUTRIN XL) 150 mg tablet TAKE 1 TABLET BY MOUTH EVERY MORNING    mirtazapine (REMERON) 7.5 mg tablet TAKE ONE TABLET BY MOUTH NIGHTLY    simvastatin (ZOCOR) 40 mg tablet TAKE ONE TABLET BY MOUTH NIGHTLY    gabapentin (NEURONTIN) 300 mg capsule Take 1 Cap by mouth three (3) times daily as needed for Pain.  atenolol (TENORMIN) 50 mg tablet Take 1 Tab by mouth daily.  clopidogrel (PLAVIX) 75 mg tab TAKE ONE TABLET BY MOUTH EVERY DAY    cyanocobalamin 1,000 mcg tablet Take 1 Tab by mouth daily.  esomeprazole (NEXIUM) 40 mg capsule TAKE ONE CAPSULE BY MOUTH EVERY DAY    fluticasone propionate (FLONASE) 50 mcg/actuation nasal spray USE 2 SPRAYS IN BOTH NOSTRILS DAILY    furosemide (LASIX) 20 mg tablet TAKE ONE TABLET BY MOUTH EVERY DAY    ondansetron (ZOFRAN ODT) 4 mg disintegrating tablet Take 1 Tab by mouth every eight (8) hours as needed for Nausea.  miscellaneous medical supply (BLOOD PRESSURE CUFF) misc Blood Pressure Cuff That Talks.  raNITIdine (ZANTAC) 150 mg tablet TAKE 1 TABLET BY MOUTH NIGHTLY    albuterol (PROVENTIL HFA, VENTOLIN HFA, PROAIR HFA) 90 mcg/actuation inhaler Take 2 Puffs by inhalation every four (4) hours as needed for Wheezing.     potassium chloride SA (MICRO-K) 10 mEq capsule TAKE ONE CAPSULE BY MOUTH TWICE A DAY    nitroglycerin (NITROSTAT) 0.4 mg SL tablet 1 Tab by SubLINGual route as needed for Chest Pain.  lidocaine (XYLOCAINE) 4 % topical cream Apply  to affected area four (4) times daily as needed for Pain.  promethazine (PHENERGAN) 25 mg tablet Take 1 Tab by mouth every six (6) hours as needed for Nausea.  folic acid (FOLVITE) 1 mg tablet Take 1 Tab by mouth daily.  calcitRIOL (ROCALTROL) 0.25 mcg capsule Take 1 Cap by mouth daily.  triamcinolone acetonide (KENALOG) 0.1 % ointment Apply  to affected area two (2) times a day. use thin layer    aspirin 81 mg tablet Take 81 mg by mouth.  phenylephrine 0.25 % suppository Insert 1 Suppository into rectum two (2) times a day. No current facility-administered medications for this visit.       Allergies   Allergen Reactions    Percocet [Oxycodone-Acetaminophen] Itching       Review of Systems:  Constitutional: Feeling well, denies fatigue, malaise  Skin: Negative for rash or lesion  HEENT: see HPI, Negative for acute hearing changes  Respiratory: Negative for cough, wheezing or SOB  Cardiovascular: Negative for chest pain, dizziness or palpitations  Gastrointestinal: Negative for nausea or abdominal pain  Genital/urinary: Negative for dysuria or voiding dysfunction  Musculoskeletal: Negative for acute myalgia or arthralgia  Neurological: Negative for HA, weakness or paresthesia  Psychlogical: Negative for depression or anxiety     Vitals:    11/14/19 1110   BP: 128/69   Pulse: 62   Resp: 18   Temp: 97.7 °F (36.5 °C)   TempSrc: Oral   SpO2: 97%   Weight: 170 lb (77.1 kg)   Height: 5' 2\" (1.575 m)       Physical Examination:  General: Well developed, well nourished, in no acute distress  Skin: Warm and dry, no rash or lesion appreciated  Head: Normocephalic, atraumatic  Neck: Normal range of motion  Respiratory: Clear to auscultation bilaterally with symmetrical effort  Cardiovascular: Normal S1, S2, Regular rate and rhythm  Extremities: FROM, normal gait  Neurological: blind, Normal strength and sensation. Psychological: Active, alert and oriented. Affect appropriate       Diagnoses and all orders for this visit:    1. Depression with anxiety  -     LORazepam (ATIVAN) 1 mg tablet; Take 1 Tab by mouth every eight (8) hours as needed for Anxiety. 2. Essential hypertension  -     METABOLIC PANEL, COMPREHENSIVE; Future    3. Hyperlipidemia, unspecified hyperlipidemia type  -     LIPID PANEL; Future  -     METABOLIC PANEL, COMPREHENSIVE; Future  -     TSH 3RD GENERATION; Future  -     HGB & HCT; Future    4. Hemorrhoids, unspecified hemorrhoid type    5. Blind in both eyes        Plan of care:  Diagnoses were discussed in detail with patient. Medication risks/benefits/side effects discussed with patient. All of the patient's questions were addressed and answered to apparent satisfaction. The patient understands and agrees with our plan of care. The patient knows to call back if they have questions about the plan of care or if symptoms change. The patient received an After-Visit Summary which contains VS, diagnoses, orders, allergy and medication lists. No future appointments. Follow-up and Dispositions    · Return in about 3 months (around 2/14/2020), or if symptoms worsen or fail to improve.

## 2019-11-21 ENCOUNTER — TELEPHONE (OUTPATIENT)
Dept: FAMILY MEDICINE CLINIC | Age: 69
End: 2019-11-21

## 2019-11-21 NOTE — TELEPHONE ENCOUNTER
Caller's first/last name:  Alejandra Diaz    Reason for call:  Ear pain    Does caller want a return call?  yes    Best contact number:  [phone]    Further clarification of call:         Patient stated that her left ear was botheirng her some last week when she was here but now it is hurting and she thinks it is infected. She would like something sent to [facility] for this.   Please advise at [phone]

## 2019-11-22 RX ORDER — AMOXICILLIN AND CLAVULANATE POTASSIUM 875; 125 MG/1; MG/1
1 TABLET, FILM COATED ORAL 2 TIMES DAILY
Qty: 20 TAB | Refills: 0 | Status: SHIPPED | OUTPATIENT
Start: 2019-11-22 | End: 2019-12-02

## 2019-11-26 DIAGNOSIS — F41.8 DEPRESSION WITH ANXIETY: ICD-10-CM

## 2019-11-26 RX ORDER — BUPROPION HYDROCHLORIDE 150 MG/1
TABLET ORAL
Qty: 90 TAB | Refills: 0 | Status: SHIPPED | OUTPATIENT
Start: 2019-11-26 | End: 2020-02-26 | Stop reason: SDUPTHER

## 2020-01-13 DIAGNOSIS — F41.8 DEPRESSION WITH ANXIETY: ICD-10-CM

## 2020-01-15 RX ORDER — LORAZEPAM 1 MG/1
TABLET ORAL
Qty: 60 TAB | Refills: 0 | Status: SHIPPED | OUTPATIENT
Start: 2020-01-15 | End: 2020-02-13 | Stop reason: SDUPTHER

## 2020-01-15 NOTE — TELEPHONE ENCOUNTER
Pt states that she called in on Thursday of last week to get her medication. Now she is completely out. Pt states that she needs 90 with 3 refills if possible.

## 2020-01-20 DIAGNOSIS — I25.83 CORONARY ARTERY DISEASE DUE TO LIPID RICH PLAQUE: ICD-10-CM

## 2020-01-20 DIAGNOSIS — I25.10 CORONARY ARTERY DISEASE DUE TO LIPID RICH PLAQUE: ICD-10-CM

## 2020-01-20 DIAGNOSIS — G47.20 CIRCADIAN RHYTHM SLEEP DISTURBANCE: ICD-10-CM

## 2020-01-23 RX ORDER — CLOPIDOGREL BISULFATE 75 MG/1
TABLET ORAL
Qty: 90 TAB | Refills: 0 | Status: SHIPPED | OUTPATIENT
Start: 2020-01-23 | End: 2020-04-27

## 2020-01-23 RX ORDER — MIRTAZAPINE 7.5 MG/1
7.5 TABLET, FILM COATED ORAL
Qty: 30 TAB | Refills: 0 | Status: SHIPPED | OUTPATIENT
Start: 2020-01-23 | End: 2020-03-12 | Stop reason: SDUPTHER

## 2020-01-29 ENCOUNTER — TELEPHONE (OUTPATIENT)
Dept: FAMILY MEDICINE CLINIC | Age: 70
End: 2020-01-29

## 2020-01-29 DIAGNOSIS — M25.512 ACUTE PAIN OF LEFT SHOULDER: ICD-10-CM

## 2020-01-29 DIAGNOSIS — M25.561 RIGHT KNEE PAIN, UNSPECIFIED CHRONICITY: ICD-10-CM

## 2020-01-29 NOTE — TELEPHONE ENCOUNTER
----- Message from Tatiana Solis sent at 1/29/2020  4:11 PM EST -----  Regarding: MD [facility]/telephone  Contact: [phone]  Caller's first and last name: pt  Reason for call: Stool lab  Callback required yes/no and why: yes  Best contact number(s): [phone]  Details to clarify the request: Pt is calling to confirm that the practice has received results from pt stool lab.

## 2020-01-30 DIAGNOSIS — M25.561 RIGHT KNEE PAIN, UNSPECIFIED CHRONICITY: ICD-10-CM

## 2020-01-30 DIAGNOSIS — M25.512 ACUTE PAIN OF LEFT SHOULDER: ICD-10-CM

## 2020-01-30 RX ORDER — GABAPENTIN 300 MG/1
300 CAPSULE ORAL
Qty: 90 CAP | Refills: 0 | Status: CANCELLED | OUTPATIENT
Start: 2020-01-30

## 2020-01-30 NOTE — TELEPHONE ENCOUNTER
Called patient to advise I do not see any lab results for stool sample. Patient expressed understanding.

## 2020-02-04 RX ORDER — GABAPENTIN 300 MG/1
300 CAPSULE ORAL
Qty: 90 CAP | Refills: 0 | Status: SHIPPED | OUTPATIENT
Start: 2020-02-04 | End: 2020-03-12 | Stop reason: SDUPTHER

## 2020-02-13 DIAGNOSIS — F41.8 DEPRESSION WITH ANXIETY: ICD-10-CM

## 2020-02-13 RX ORDER — LORAZEPAM 1 MG/1
TABLET ORAL
Qty: 14 TAB | Refills: 0 | Status: SHIPPED | OUTPATIENT
Start: 2020-02-13 | End: 2020-02-26 | Stop reason: SDUPTHER

## 2020-02-13 RX ORDER — LORAZEPAM 1 MG/1
TABLET ORAL
Qty: 60 TAB | Refills: 0 | OUTPATIENT
Start: 2020-02-13

## 2020-02-13 NOTE — TELEPHONE ENCOUNTER
----- Message from Katie Araiza sent at 2/13/2020  9:16 AM EST -----  Regarding: Dr. Pierre Roach refill  Contact: [phone] (if not patient):Pt  Relationship of caller (if not patient): Pt  Best contact number(s): [phone]  Name of medication and dosage if known: \" lorazepam 0.5mg\"  Is patient out of this medication (yes/no): yes  Pharmacy name: [facility]  Pharmacy listed in chart? (yes/no): yes  Pharmacy phone number: [phone]  Date of last visit: 11/14/2019  Details to clarify the request: na

## 2020-02-13 NOTE — TELEPHONE ENCOUNTER
Called patient to advise patient needs appt. She stated she only had 2 pills left and she has to call to schedule a ride to appt, and is unsure when she can come in. Advised her she should call now to schedule and I will ask Dr. Pepe Catherine to give a temporary supply to [facility] until can be seen. She expressed understanding.

## 2020-02-13 NOTE — TELEPHONE ENCOUNTER
Prescription e-scribed to pharmacy. She will need to be seen and make an appointment for f/u at [address] [place] [address].

## 2020-02-14 ENCOUNTER — TELEPHONE (OUTPATIENT)
Dept: FAMILY MEDICINE CLINIC | Age: 70
End: 2020-02-14

## 2020-02-19 ENCOUNTER — TELEPHONE (OUTPATIENT)
Dept: FAMILY MEDICINE CLINIC | Age: 70
End: 2020-02-19

## 2020-02-19 NOTE — TELEPHONE ENCOUNTER
Pt cannot get to appointment until March 11 due to transportation. She will run out before then. She wants to know can you call in her LORAZEPAM until she can get in on 03/11/20. The Wellbutrin, she is stretching by taking every other day until she gets in.  Thanks

## 2020-02-26 DIAGNOSIS — F41.8 DEPRESSION WITH ANXIETY: ICD-10-CM

## 2020-02-26 NOTE — TELEPHONE ENCOUNTER
----- Message from Ning Avilez sent at 2/26/2020  8:20 AM EST -----  Regarding: Dr. Alex Bailon (if not patient):      Relationship of caller (if not patient):      Best contact number(s):[phone]      Name of medication and dosage if known:  Wellbutrin and Lorazepam      Is patient out of this medication (yes/no):yes      Pharmacy name:[facility]    Pharmacy listed in chart? (yes/no):yes  Pharmacy phone number:      Details to clarify the request:Patient is trying to get a refill of her medications she has an appointment with you on March 11, 2020      Ning Avilez

## 2020-02-27 RX ORDER — LORAZEPAM 1 MG/1
TABLET ORAL
Qty: 28 TAB | Refills: 0 | Status: SHIPPED | OUTPATIENT
Start: 2020-02-27 | End: 2020-03-12 | Stop reason: SDUPTHER

## 2020-02-27 RX ORDER — BUPROPION HYDROCHLORIDE 150 MG/1
TABLET ORAL
Qty: 30 TAB | Refills: 0 | Status: SHIPPED | OUTPATIENT
Start: 2020-02-27 | End: 2020-03-12 | Stop reason: SDUPTHER

## 2020-03-12 ENCOUNTER — OFFICE VISIT (OUTPATIENT)
Dept: FAMILY MEDICINE CLINIC | Age: 70
End: 2020-03-12

## 2020-03-12 VITALS
DIASTOLIC BLOOD PRESSURE: 74 MMHG | BODY MASS INDEX: 31.47 KG/M2 | HEART RATE: 61 BPM | RESPIRATION RATE: 16 BRPM | HEIGHT: 62 IN | SYSTOLIC BLOOD PRESSURE: 136 MMHG | TEMPERATURE: 97 F | OXYGEN SATURATION: 98 % | WEIGHT: 171 LBS

## 2020-03-12 DIAGNOSIS — M25.512 ACUTE PAIN OF LEFT SHOULDER: ICD-10-CM

## 2020-03-12 DIAGNOSIS — I25.10 CORONARY ARTERY DISEASE DUE TO LIPID RICH PLAQUE: ICD-10-CM

## 2020-03-12 DIAGNOSIS — K21.9 GASTROESOPHAGEAL REFLUX DISEASE WITHOUT ESOPHAGITIS: ICD-10-CM

## 2020-03-12 DIAGNOSIS — F41.8 DEPRESSION WITH ANXIETY: ICD-10-CM

## 2020-03-12 DIAGNOSIS — E78.5 HYPERLIPIDEMIA, UNSPECIFIED HYPERLIPIDEMIA TYPE: ICD-10-CM

## 2020-03-12 DIAGNOSIS — G47.20 CIRCADIAN RHYTHM SLEEP DISTURBANCE: ICD-10-CM

## 2020-03-12 DIAGNOSIS — I25.83 CORONARY ARTERY DISEASE DUE TO LIPID RICH PLAQUE: ICD-10-CM

## 2020-03-12 DIAGNOSIS — M25.561 RIGHT KNEE PAIN, UNSPECIFIED CHRONICITY: ICD-10-CM

## 2020-03-12 RX ORDER — ATENOLOL 50 MG/1
50 TABLET ORAL DAILY
Qty: 90 TAB | Refills: 0 | Status: SHIPPED | OUTPATIENT
Start: 2020-03-12 | End: 2020-06-08 | Stop reason: SDUPTHER

## 2020-03-12 RX ORDER — LORAZEPAM 1 MG/1
TABLET ORAL
Qty: 60 TAB | Refills: 0 | Status: SHIPPED | OUTPATIENT
Start: 2020-03-12 | End: 2020-04-13 | Stop reason: SDUPTHER

## 2020-03-12 RX ORDER — BUPROPION HYDROCHLORIDE 150 MG/1
TABLET ORAL
Qty: 30 TAB | Refills: 0 | Status: SHIPPED | OUTPATIENT
Start: 2020-03-12 | End: 2020-04-13 | Stop reason: SDUPTHER

## 2020-03-12 RX ORDER — GABAPENTIN 300 MG/1
300 CAPSULE ORAL
Qty: 90 CAP | Refills: 0 | Status: SHIPPED | OUTPATIENT
Start: 2020-03-12 | End: 2020-06-08 | Stop reason: SDUPTHER

## 2020-03-12 RX ORDER — ESOMEPRAZOLE MAGNESIUM 40 MG/1
CAPSULE, DELAYED RELEASE ORAL
Qty: 90 CAP | Refills: 0 | Status: SHIPPED | OUTPATIENT
Start: 2020-03-12 | End: 2020-04-23 | Stop reason: SDUPTHER

## 2020-03-12 RX ORDER — LORAZEPAM 1 MG/1
TABLET ORAL
Qty: 28 TAB | Refills: 0 | Status: CANCELLED | OUTPATIENT
Start: 2020-03-12

## 2020-03-12 RX ORDER — GABAPENTIN 300 MG/1
300 CAPSULE ORAL
Qty: 90 CAP | Refills: 0 | Status: CANCELLED | OUTPATIENT
Start: 2020-03-12

## 2020-03-12 RX ORDER — SIMVASTATIN 40 MG/1
40 TABLET, FILM COATED ORAL
Qty: 90 TAB | Refills: 0 | Status: SHIPPED | OUTPATIENT
Start: 2020-03-12 | End: 2020-03-23 | Stop reason: SDUPTHER

## 2020-03-12 RX ORDER — MIRTAZAPINE 7.5 MG/1
7.5 TABLET, FILM COATED ORAL
Qty: 30 TAB | Refills: 0 | Status: SHIPPED | OUTPATIENT
Start: 2020-03-12 | End: 2020-07-02

## 2020-03-12 NOTE — PROGRESS NOTES
1. Have you been to the ER, urgent care clinic since your last visit? Hospitalized since your last visit? No    2. Have you seen or consulted any other health care providers outside of the [address] [place] [address] since your last visit? Include any pap smears or colon screening.  No  Reviewed record in preparation for visit and have necessary documentation  Pt did not bring medication to office visit for review    Goals that were addressed and/or need to be completed during or after this appointment include   Health Maintenance Due   Topic Date Due    FOBT Q1Y Age,18+  04/22/1970    DTaP/Tdap/Td series (1 - Tdap) 04/22/1973    Shingrix Vaccine Age 50> (1 of 2) 04/22/2002    Bone Densitometry (Dexa) Screening  04/22/2017    Breast Cancer Screen Mammogram  05/20/2017    Medicare Yearly Exam  01/26/2020

## 2020-03-12 NOTE — PROGRESS NOTES
I saw and evaluated the patient with the resident, performing the key elements of the exam and service. I discussed the findings, assessment and plan with the resident and agree with the resident's findings and plan as documented in the resident's note. Serg Guerra M.D.

## 2020-03-12 NOTE — PROGRESS NOTES
Subjective  CC: Ne Maxwell is an 81 y.o. female presents for med refill    HTN and CAD  Currently on atenolol. BP at home running 130/70. No CP or SOB, LE swelling. GERD  Currently on Nexium. She states that this has been working well for her since changing from zantac. Denies early satiety and persistent epigastric burning. Anxiety & Depression  She is on mirtazapine and Wellbutrin for depression and sleep disturbance. She states that some times she sleeps well and sometimes, when she is more upset she has a more difficult time with sleep. She states that her mood is neutral for the most part. HLD  Currently on simvastatin. Last lipid panel was on 11/14/19. Allergies - reviewed: Allergies   Allergen Reactions    Percocet [Oxycodone-Acetaminophen] Itching         Medications - reviewed:   Current Outpatient Medications   Medication Sig    mirtazapine (REMERON) 7.5 mg tablet Take 1 Tab by mouth nightly.  buPROPion XL (WELLBUTRIN XL) 150 mg tablet TAKE 1 TABLET BY MOUTH EVERY MORNING    atenoloL (TENORMIN) 50 mg tablet Take 1 Tab by mouth daily.  esomeprazole (NEXIUM) 40 mg capsule TAKE ONE CAPSULE BY MOUTH EVERY DAY    simvastatin (ZOCOR) 40 mg tablet Take 1 Tab by mouth nightly.  LORazepam (ATIVAN) 1 mg tablet TAKE ONE TABLET BY MOUTH EVERY 8 HOURS AS NEEDED FOR ANXIETY    gabapentin (NEURONTIN) 300 mg capsule Take 1 Cap by mouth three (3) times daily as needed for Pain.  clopidogreL (PLAVIX) 75 mg tab TAKE ONE TABLET BY MOUTH EVERY DAY    fluticasone propionate (FLONASE) 50 mcg/actuation nasal spray USE 2 SPRAYS IN BOTH NOSTRILS DAILY    furosemide (LASIX) 20 mg tablet TAKE ONE TABLET BY MOUTH EVERY DAY    ondansetron (ZOFRAN ODT) 4 mg disintegrating tablet Take 1 Tab by mouth every eight (8) hours as needed for Nausea.  albuterol (PROVENTIL HFA, VENTOLIN HFA, PROAIR HFA) 90 mcg/actuation inhaler Take 2 Puffs by inhalation every four (4) hours as needed for Wheezing.     potassium chloride SA (MICRO-K) 10 mEq capsule TAKE ONE CAPSULE BY MOUTH TWICE A DAY    nitroglycerin (NITROSTAT) 0.4 mg SL tablet 1 Tab by SubLINGual route as needed for Chest Pain.  promethazine (PHENERGAN) 25 mg tablet Take 1 Tab by mouth every six (6) hours as needed for Nausea.  folic acid (FOLVITE) 1 mg tablet Take 1 Tab by mouth daily.  calcitRIOL (ROCALTROL) 0.25 mcg capsule Take 1 Cap by mouth daily.  triamcinolone acetonide (KENALOG) 0.1 % ointment Apply  to affected area two (2) times a day. use thin layer    aspirin 81 mg tablet Take 81 mg by mouth.  phenylephrine 0.25 % suppository Insert 1 Suppository into rectum two (2) times a day.  cyanocobalamin 1,000 mcg tablet Take 1 Tab by mouth daily.  miscellaneous medical supply (BLOOD PRESSURE CUFF) misc Blood Pressure Cuff That Talks.  raNITIdine (ZANTAC) 150 mg tablet TAKE 1 TABLET BY MOUTH NIGHTLY    lidocaine (XYLOCAINE) 4 % topical cream Apply  to affected area four (4) times daily as needed for Pain. No current facility-administered medications for this visit. Past Medical History - reviewed:  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- [facility]         Immunizations - reviewed:   Immunization History   Administered Date(s) Administered    Influenza High Dose Vaccine PF 10/18/2017, 10/10/2019    Influenza Vaccine 10/16/2013    Influenza Vaccine (Tri) Adjuvanted 09/18/2018    Influenza Vaccine Split 10/04/2012    Pneumococcal Conjugate (PCV-13) 01/29/2019    Pneumococcal Polysaccharide (PPSV-23) 10/18/2016    Zoster Vaccine, Live 08/29/2017         ROS  Review of Systems : A review of systems was performed. All pertinent negatives and positives are mentioned in the HPI.     Physical Exam  Visit Vitals  /74 (BP 1 Location: Left arm, BP Patient Position: Sitting)   Pulse 61   Temp 97 °F (36.1 °C) (Oral)   Resp 16   Ht 5' 2\" (1.575 m)   Wt 171 lb (77.6 kg)   SpO2 98%   BMI 31.28 kg/m²       General appearance - Alert, NAD. Blind. Head: Atraumatic. Normocephalic. No lymphadenopathy  Eyes: EOMI. Sclera white. Respiratory - LCTAB. No wheeze/rale/rhonchi  Heart - Normal rate, regular rhythm. No m/r/r  Abdomen - Soft, non tender. Non distended. Neurological - No focal deficits. Speech normal.   Extremities - No LE edema. Distal pulses intact  Skin - normal coloration and normal turgor. No cyanosis, no rash. Assessment/Plan  1. Depression with anxiety - stable. - buPROPion XL (WELLBUTRIN XL) 150 mg tablet; TAKE 1 TABLET BY MOUTH EVERY MORNING  Dispense: 30 Tab; Refill: 0    2. Circadian rhythm sleep disturbance - stable. - mirtazapine (REMERON) 7.5 mg tablet; Take 1 Tab by mouth nightly. Dispense: 30 Tab; Refill: 0    3. Gastroesophageal reflux disease without esophagitis - stable, no red flags  - esomeprazole (NEXIUM) 40 mg capsule; TAKE ONE CAPSULE BY MOUTH EVERY DAY  Dispense: 90 Cap; Refill: 0    4. Coronary artery disease due to lipid rich plaque - stable  - simvastatin (ZOCOR) 40 mg tablet  Dispense: 90 Tab; Refill: 0    5. Hyperlipidemia, unspecified hyperlipidemia type - stable  - simvastatin (ZOCOR) 40 mg tablet  Dispense: 90 Tab; Refill: 0        Follow-up and Dispositions    · Return in about 3 months (around 6/12/2020) for -Due for a Medicare Wellness Visit. I have discussed the aforementioned diagnoses and plan with the patient in detail. I have provided information in person and/or in AVS. All questions answered prior to discharge.     Trudy Barr MD  Family Medicine Resident

## 2020-03-12 NOTE — PATIENT INSTRUCTIONS
Follow-up in 3 months with Dr Ammon Iglesias for your medicare wellness and to refills on gabapentin and lorazepam.

## 2020-03-20 ENCOUNTER — TELEPHONE (OUTPATIENT)
Dept: FAMILY MEDICINE CLINIC | Age: 70
End: 2020-03-20

## 2020-03-20 NOTE — TELEPHONE ENCOUNTER
Pt called stating that she heard on the news that there is a medication that can possibly treat the coronavirus. Its a medication used to treat malaria. Pt wants to know if Dr. Leticia Graf would prescirbe that medication for her just in case she gets the virus.

## 2020-03-20 NOTE — TELEPHONE ENCOUNTER
The medication she is talking about has not to date been demonstrated to be effective. Trials are being started to see if this helps critically ill patients. The medication is in short supply and is reserved for hospitalized patients who have tested positive for Covid-19.

## 2020-03-23 DIAGNOSIS — I25.10 CORONARY ARTERY DISEASE DUE TO LIPID RICH PLAQUE: ICD-10-CM

## 2020-03-23 DIAGNOSIS — E78.5 HYPERLIPIDEMIA, UNSPECIFIED HYPERLIPIDEMIA TYPE: ICD-10-CM

## 2020-03-23 DIAGNOSIS — I25.83 CORONARY ARTERY DISEASE DUE TO LIPID RICH PLAQUE: ICD-10-CM

## 2020-03-23 RX ORDER — SIMVASTATIN 40 MG/1
40 TABLET, FILM COATED ORAL
Qty: 90 TAB | Refills: 0 | Status: SHIPPED | OUTPATIENT
Start: 2020-03-23 | End: 2020-06-19 | Stop reason: SDUPTHER

## 2020-04-09 ENCOUNTER — VIRTUAL VISIT (OUTPATIENT)
Dept: FAMILY MEDICINE CLINIC | Age: 70
End: 2020-04-09

## 2020-04-09 DIAGNOSIS — G47.00 INSOMNIA, UNSPECIFIED TYPE: ICD-10-CM

## 2020-04-09 DIAGNOSIS — R50.9 FEVER, UNSPECIFIED FEVER CAUSE: ICD-10-CM

## 2020-04-09 DIAGNOSIS — R09.81 NASAL CONGESTION: Primary | ICD-10-CM

## 2020-04-09 DIAGNOSIS — H54.3 BLINDNESS OF BOTH EYES: ICD-10-CM

## 2020-04-09 DIAGNOSIS — J30.2 SEASONAL ALLERGIC RHINITIS, UNSPECIFIED TRIGGER: ICD-10-CM

## 2020-04-09 DIAGNOSIS — R22.0 SWELLING OF NOSE: ICD-10-CM

## 2020-04-09 RX ORDER — MINERAL OIL
180 ENEMA (ML) RECTAL
Qty: 30 TAB | Refills: 1 | Status: SHIPPED | OUTPATIENT
Start: 2020-04-09 | End: 2020-10-26

## 2020-04-09 NOTE — PROGRESS NOTES
Caron Apgar  81 y.o. female  1950  [address]  [id number]    [phone] (home)      [facility]:    Telephone Encounter  Jose M Maravilla MD       Encounter Date: 2020 at 11:19 AM    Consent:   She and/or the health care decision maker is aware that that she may receive a bill for this telephone service, depending on her insurance coverage, and has provided verbal consent to proceed: Yes    Chief Complaint   Patient presents with    Nasal Congestion       History of Present Illness   Chyna Quintero is a 81 y.o. female was evaluated by telephone. I communicated with the patient and/or health care decision maker about Nasal congestion. 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 Patient reports nasal congestion, \"swollen\" nose, and eye pain for about 1 week. Not blowing nose much, but \"burning sensation in nose\". Has hx of allergies, started taking flonase 1 week ago. Patient says pain is mild, about 3-4/10. Reports subjective fever, but unable to check own temperature due to blindness. Daughter who is hospital nurse at [address] [place] [address] checks in on her about once a week, last time about a week ago. Pt also complains of lack of sleep 2/2 to symptoms. Denies any chills, headache, N/V, trouble eating or drinking, trouble taking medications, or cough. Denies recent travel. Reports she has transport service to get her to appointments, but it usually requires a 10-12 day advanced planning for transportation to be set up. Patient states she does not want to have to go anywhere to be seen unless necessary because she is worried about her heart history if she were to catch COVID-19. Review of Systems   Review of Systems   Constitutional: Positive for fever (Subjective). Negative for chills. HENT: Positive for congestion and sinus pain. Eyes:        Hx of blindness   Respiratory: Negative for cough. Gastrointestinal: Negative for abdominal pain, diarrhea, nausea and vomiting. Musculoskeletal: Negative for myalgias. Psychiatric/Behavioral: The patient has insomnia. Vitals/Objective:   General: Patient speaking in complete sentences without effort. Normal speech and cooperative. Due to this being a Virtual Check-in/Telephone evaluation, many elements of the physical examination are unable to be assessed. Assessment and Plan:   Time-based coding, delete if not needed: I spent at least 25 minutes with this established patient, and >50% of the time was spent counseling and/or coordinating care regarding nasal congestion and subjective fever  Total Time: minutes: 21-30 minutes      80 y/o F with subjective fever and nasal congestion with hx of blindness, possibly due to sinusitis vs URI vs allergies    1. Nasal congestion with subjective fever - possibly 2/2 to allergies vs sinusitis vs URI, patient unable to take own temperature, will try to set up an urgent home health visit with [facility] to have medical personal evaluate pt in person and to help take temperature and pulse ox. Pt also instructed to have daughter or pharmacy personal that delivers her medications be asked to help take her temperature  - Pt voiced understanding and agreement to able plan  - Pt to call if symptoms worsen or if has recorded temperature 100.4 or greater, pt voiced understanding  -START fexofenadine (ALLEGRA) 180 mg tablet; Take 1 Tab by mouth daily as needed for Allergies. Dispense: 30 Tab; Refill: 1  - [address]  - Pt to follow up by phone in the next 3-4 days regardless, sooner if needed    2. Insomnia, unspecified type - likely related to nasal symptoms  -Treatment as above for nasal symptoms  -Encouraging good PO hydration  -Suggested melatonin as sleep aid    3. Seasonal allergic rhinitis, unspecified trigger  -START fexofenadine (ALLEGRA) 180 mg tablet;  Take 1 Tab by mouth daily as needed for Allergies. Dispense: 30 Tab; Refill: 1    4. Blindness of both eyes  - REFERRAL TO HOME HEALTH      We discussed the expected course, resolution and complications of the diagnosis(es) in detail. Medication risks, benefits, costs, interactions, and alternatives were discussed as indicated. I advised her to contact the office if her condition worsens, changes or fails to improve as anticipated. She expressed understanding with the diagnosis(es) and plan. Patient understands that this encounter was a temporary measure, and the importance of further follow up and examination was emphasized. Patient verbalized understanding. Patient informed to follow up: in 3-4 days if symptoms improve, sooner if symptoms worsen or develop fever, pt instructed if she starts to feel worse or develops fever, would need to be evaluated in person, pt was given that option but as she feels \"ok for now\" pt elected to stay at home and see how it goes    I affirm this is a Patient Initiated Episode with an Established Patient who has not had a related appointment within my department in the past 7 days or scheduled within the next 24 hours. Note: not billable if this call serves to triage the patient into an appointment for the relevant concern      Electronically Signed: Kleber Denton MD  Providers location when delivering service: Home        ICD-10-CM ICD-9-CM    1. Nasal congestion R09.81 478.19 fexofenadine (ALLEGRA) 180 mg tablet      [address] [place] [address]   2. Swelling of nose R22.0 784.2    3. Insomnia, unspecified type G47.00 780.52    4. Seasonal allergic rhinitis, unspecified trigger J30.2 477.9 fexofenadine (ALLEGRA) 180 mg tablet   5. Blindness of both eyes H54.3 369.00 REFERRAL TO HOME HEALTH   6.  Fever, unspecified fever cause R50.9 780.60 REFERRAL TO [address] to the emergency declaration under the [facility] waiver authority and the Coronavirus Preparedness and Response Supplemental Appropriations Act, this Virtual  Visit was conducted, with patient's consent, to reduce the patient's risk of exposure to COVID-19 and provide continuity of care for an established patient. History   Patients past medical, surgical and family histories were personally reviewed.     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     Past Surgical History:   Procedure Laterality Date    CABG, VEIN, FIVE  2006    CARDIAC SURG PROCEDURE UNLIST      HX ORTHOPAEDIC       Family History   Problem Relation Age of Onset    No Known Problems Mother     No Known Problems Father      Social History     Socioeconomic History    Marital status:      Spouse name: Not on file    Number of children: Not on file    Years of education: Not on file    Highest education level: Not on file   Occupational History    Not on file   Social Needs    Financial resource strain: Not on file    Food insecurity     Worry: Not on file     Inability: Not on file    Transportation needs     Medical: Not on file     Non-medical: Not on file   Tobacco Use    Smoking status: Never Smoker    Smokeless tobacco: Never Used   Substance and Sexual Activity    Alcohol use: Yes     Comment: social    Drug use: No    Sexual activity: Yes     Partners: Male     Birth control/protection: None   Lifestyle    Physical activity     Days per week: Not on file     Minutes per session: Not on file    Stress: Not on file   Relationships    Social connections     Talks on phone: Not on file     Gets together: Not on file     Attends Shinto service: Not on file     Active member of club or organization: Not on file     Attends meetings of clubs or organizations: Not on file     Relationship status: Not on file    Intimate partner violence     Fear of current or ex partner: Not on file     Emotionally abused: Not on file     Physically abused: Not on file     Forced sexual activity: Not on file   Other Topics Concern    Not on file   Social History Narrative    Not on file            Current Medications/Allergies   Medications and Allergies reviewed:    Current Outpatient Medications   Medication Sig Dispense Refill    simvastatin (ZOCOR) 40 mg tablet Take 1 Tab by mouth nightly. 90 Tab 0    mirtazapine (REMERON) 7.5 mg tablet Take 1 Tab by mouth nightly. 30 Tab 0    buPROPion XL (WELLBUTRIN XL) 150 mg tablet TAKE 1 TABLET BY MOUTH EVERY MORNING 30 Tab 0    atenoloL (TENORMIN) 50 mg tablet Take 1 Tab by mouth daily. 90 Tab 0    esomeprazole (NEXIUM) 40 mg capsule TAKE ONE CAPSULE BY MOUTH EVERY DAY 90 Cap 0    gabapentin (NEURONTIN) 300 mg capsule Take 1 Cap by mouth three (3) times daily as needed for Pain. 90 Cap 0    LORazepam (ATIVAN) 1 mg tablet TAKE ONE TABLET BY MOUTH EVERY 8 HOURS AS NEEDED FOR ANXIETY 60 Tab 0    clopidogreL (PLAVIX) 75 mg tab TAKE ONE TABLET BY MOUTH EVERY DAY 90 Tab 0    fluticasone propionate (FLONASE) 50 mcg/actuation nasal spray USE 2 SPRAYS IN BOTH NOSTRILS DAILY 16 g 2    furosemide (LASIX) 20 mg tablet TAKE ONE TABLET BY MOUTH EVERY DAY 90 Tab 0    ondansetron (ZOFRAN ODT) 4 mg disintegrating tablet Take 1 Tab by mouth every eight (8) hours as needed for Nausea. 30 Tab 0    miscellaneous medical supply (BLOOD PRESSURE CUFF) misc Blood Pressure Cuff That Talks. 1 Each 0    albuterol (PROVENTIL HFA, VENTOLIN HFA, PROAIR HFA) 90 mcg/actuation inhaler Take 2 Puffs by inhalation every four (4) hours as needed for Wheezing. 1 Inhaler 3    potassium chloride SA (MICRO-K) 10 mEq capsule TAKE ONE CAPSULE BY MOUTH TWICE A  Cap 0    nitroglycerin (NITROSTAT) 0.4 mg SL tablet 1 Tab by SubLINGual route as needed for Chest Pain.  1 Bottle 3    promethazine (PHENERGAN) 25 mg tablet Take 1 Tab by mouth every six (6) hours as needed for Nausea. 20 Tab 0    folic acid (FOLVITE) 1 mg tablet Take 1 Tab by mouth daily. 90 Tab 1    calcitRIOL (ROCALTROL) 0.25 mcg capsule Take 1 Cap by mouth daily. 90 Cap 1    triamcinolone acetonide (KENALOG) 0.1 % ointment Apply  to affected area two (2) times a day. use thin layer 30 g 0    aspirin 81 mg tablet Take 81 mg by mouth.          Allergies   Allergen Reactions    Percocet [Oxycodone-Acetaminophen] Itching

## 2020-04-09 NOTE — PROGRESS NOTES
I discussed the findings, assessment and plan in detail with the resident and agree with the resident's findings and plan as documented in the resident's note. Saba Sears M.D.

## 2020-04-13 ENCOUNTER — TELEPHONE (OUTPATIENT)
Dept: FAMILY MEDICINE CLINIC | Age: 70
End: 2020-04-13

## 2020-04-13 DIAGNOSIS — F41.8 DEPRESSION WITH ANXIETY: ICD-10-CM

## 2020-04-13 NOTE — TELEPHONE ENCOUNTER
Spoke with pt   Home health has not been to see her  She reports feeling \"better\"  Telephone appt scheduled with Dr Trevor Mercado

## 2020-04-14 ENCOUNTER — VIRTUAL VISIT (OUTPATIENT)
Dept: FAMILY MEDICINE CLINIC | Age: 70
End: 2020-04-14

## 2020-04-14 DIAGNOSIS — G47.00 INSOMNIA, UNSPECIFIED TYPE: ICD-10-CM

## 2020-04-14 DIAGNOSIS — R09.81 NASAL CONGESTION: Primary | ICD-10-CM

## 2020-04-14 RX ORDER — ALBUTEROL SULFATE 90 UG/1
2 AEROSOL, METERED RESPIRATORY (INHALATION)
Qty: 1 INHALER | Refills: 1 | Status: SHIPPED | OUTPATIENT
Start: 2020-04-14 | End: 2020-10-09 | Stop reason: SDUPTHER

## 2020-04-14 RX ORDER — LANOLIN ALCOHOL/MO/W.PET/CERES
3 CREAM (GRAM) TOPICAL
Qty: 90 TAB | Refills: 0 | Status: SHIPPED | OUTPATIENT
Start: 2020-04-14 | End: 2020-10-26

## 2020-04-14 RX ORDER — LORAZEPAM 1 MG/1
TABLET ORAL
Qty: 60 TAB | Refills: 0 | Status: SHIPPED | OUTPATIENT
Start: 2020-04-14 | End: 2020-05-12

## 2020-04-14 RX ORDER — BUPROPION HYDROCHLORIDE 150 MG/1
TABLET ORAL
Qty: 30 TAB | Refills: 2 | Status: SHIPPED | OUTPATIENT
Start: 2020-04-14 | End: 2020-07-02

## 2020-04-14 NOTE — PROGRESS NOTES
[address] Medicine Residency Attending Addendum:  Dr. Lovely Obrien MD,  the patient and I were not physically present during this encounter. The resident and I are concurrently monitoring the patient care through appropriate telecommunication technology. I discussed the findings, assessment and plan with the resident and agree with the resident's findings and plan as documented in the resident's note.       Drake Epps MD

## 2020-04-14 NOTE — PROGRESS NOTES
Katya Cheatham  81 y.o. female  1950  [address]  [id number]    [phone] (home)      [facility]:    Telephone Encounter  Kannan Sawyer MD       Encounter Date: 4/14/2020 at 9:04 AM    Consent:  She and/or the health care decision maker is aware that that she may receive a bill for this telephone service, depending on her insurance coverage, and has provided verbal consent to proceed: Yes    Chief Complaint   Patient presents with    Nasal Congestion       History of Present Illness   Chyna Mayer is a 81 y.o. female was evaluated by telephone. I communicated with the patient and/or health care decision maker about nasal congestion and insomnia. Patient reports \"I feel a whole lot better\" since last week. Since 1 week ago has been having nasal congestion, pressure, and some face pain. Pain has resolved. Nasal congestion has improved with allegra which patient is taking daily. Patient denies any subjective fever or chills. Patient with blindness and unable to take her own temperature. Denies any travel or sick contacts. Has stayed home for 6 weeks. Patient does complain of continued trouble sleeping which she attributes to blindness and being at home for prolonged period of time. Review of Systems   Review of Systems   Constitutional: Negative for chills and fever. HENT: Positive for congestion. Eyes:        Blind in both eyes at baseline   Respiratory: Negative for cough and shortness of breath. Cardiovascular: Negative for chest pain. Gastrointestinal: Negative for nausea and vomiting. Vitals/Objective:   General: Patient speaking in complete sentences without effort. Normal speech and cooperative. Patient pleasant to talk to. Due to this being a Virtual Check-in/Telephone evaluation, many elements of the physical examination are unable to be assessed.     Assessment and Plan:   Time-based coding, delete if not needed: I spent at least 15 minutes with this established patient, and >50% of the time was spent counseling and/or coordinating care regarding nasal congestion and insomnia  Total Time: minutes: 11-20 minutes    80 y/o F with improved congestion but still with trouble sleeping    1. Nasal congestion - improved with allegra, symptoms likely 2/2 to allergies, less likely due to infectious process at this point unless is resolving viral URI  -Continue daily Allegra  -Encouraging good PO hydration    2. Insomnia, unspecified type - pt with trouble sleeping, attributes to blindness and being at home for past  6 weeks due to COVID pandemic  -START melatonin 3 mg tablet; Take 1 Tab by mouth nightly as needed for Insomnia or Sleep. Dispense: 90 Tab; Refill: 0        We discussed the expected course, resolution and complications of the diagnosis(es) in detail. Medication risks, benefits, costs, interactions, and alternatives were discussed as indicated. I advised her to contact the office if her condition worsens, changes or fails to improve as anticipated. She expressed understanding with the diagnosis(es) and plan. Patient understands that this encounter was a temporary measure, and the importance of further follow up and examination was emphasized. Patient verbalized understanding. Patient informed to follow up: as needed if symptoms worsen or do not improve    I affirm this is a Patient Initiated Episode with an Established Patient who has not had a related appointment within my department in the past 7 days or scheduled within the next 24 hours. Note: not billable if this call serves to triage the patient into an appointment for the relevant concern      Electronically Signed: Mino Ortiz MD  Providers location when delivering service: Clinic        ICD-10-CM ICD-9-CM    1. Nasal congestion R09.81 478.19    2.  Insomnia, unspecified type G47.00 780.52 melatonin 3 mg tablet       Pursuant to the emergency declaration under the [facility] waiver authority and the [employer] Act, this Virtual  Visit was conducted, with patient's consent, to reduce the patient's risk of exposure to COVID-19 and provide continuity of care for an established patient. History   Patients past medical, surgical and family histories were personally reviewed.     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     Past Surgical History:   Procedure Laterality Date    CABG, VEIN, FIVE  2006    CARDIAC SURG PROCEDURE UNLIST      HX ORTHOPAEDIC       Family History   Problem Relation Age of Onset    No Known Problems Mother     No Known Problems Father      Social History     Socioeconomic History    Marital status:      Spouse name: Not on file    Number of children: Not on file    Years of education: Not on file    Highest education level: Not on file   Occupational History    Not on file   Social Needs    Financial resource strain: Not on file    Food insecurity     Worry: Not on file     Inability: Not on file    Transportation needs     Medical: Not on file     Non-medical: Not on file   Tobacco Use    Smoking status: Never Smoker    Smokeless tobacco: Never Used   Substance and Sexual Activity    Alcohol use: Yes     Comment: social    Drug use: No    Sexual activity: Yes     Partners: Male     Birth control/protection: None   Lifestyle    Physical activity     Days per week: Not on file     Minutes per session: Not on file    Stress: Not on file   Relationships    Social connections     Talks on phone: Not on file     Gets together: Not on file     Attends Mormonism service: Not on file     Active member of club or organization: Not on file     Attends meetings of clubs or organizations: Not on file     Relationship status: Not on file    Intimate partner violence     Fear of current or ex partner: Not on file     Emotionally abused: Not on file     Physically abused: Not on file     Forced sexual activity: Not on file   Other Topics Concern    Not on file   Social History Narrative    Not on file            Current Medications/Allergies   Medications and Allergies reviewed:    Current Outpatient Medications   Medication Sig Dispense Refill    melatonin 3 mg tablet Take 1 Tab by mouth nightly as needed for Insomnia or Sleep. 90 Tab 0    LORazepam (ATIVAN) 1 mg tablet TAKE ONE TABLET BY MOUTH EVERY 8 HOURS AS NEEDED FOR ANXIETY 60 Tab 0    buPROPion XL (WELLBUTRIN XL) 150 mg tablet TAKE 1 TABLET BY MOUTH EVERY MORNING 30 Tab 2    albuterol (PROVENTIL HFA, VENTOLIN HFA, PROAIR HFA) 90 mcg/actuation inhaler Take 2 Puffs by inhalation every four (4) hours as needed for Wheezing. 1 Inhaler 1    fexofenadine (ALLEGRA) 180 mg tablet Take 1 Tab by mouth daily as needed for Allergies. 30 Tab 1    simvastatin (ZOCOR) 40 mg tablet Take 1 Tab by mouth nightly. 90 Tab 0    mirtazapine (REMERON) 7.5 mg tablet Take 1 Tab by mouth nightly. 30 Tab 0    atenoloL (TENORMIN) 50 mg tablet Take 1 Tab by mouth daily. 90 Tab 0    esomeprazole (NEXIUM) 40 mg capsule TAKE ONE CAPSULE BY MOUTH EVERY DAY 90 Cap 0    gabapentin (NEURONTIN) 300 mg capsule Take 1 Cap by mouth three (3) times daily as needed for Pain. 90 Cap 0    clopidogreL (PLAVIX) 75 mg tab TAKE ONE TABLET BY MOUTH EVERY DAY 90 Tab 0    fluticasone propionate (FLONASE) 50 mcg/actuation nasal spray USE 2 SPRAYS IN BOTH NOSTRILS DAILY 16 g 2    furosemide (LASIX) 20 mg tablet TAKE ONE TABLET BY MOUTH EVERY DAY 90 Tab 0    ondansetron (ZOFRAN ODT) 4 mg disintegrating tablet Take 1 Tab by mouth every eight (8) hours as needed for Nausea.  30 Tab 0    miscellaneous medical supply (BLOOD PRESSURE CUFF) misc Blood Pressure Cuff That Talks. 1 Each 0    potassium chloride SA (MICRO-K) 10 mEq capsule TAKE ONE CAPSULE BY MOUTH TWICE A  Cap 0    nitroglycerin (NITROSTAT) 0.4 mg SL tablet 1 Tab by SubLINGual route as needed for Chest Pain. 1 Bottle 3    promethazine (PHENERGAN) 25 mg tablet Take 1 Tab by mouth every six (6) hours as needed for Nausea. 20 Tab 0    folic acid (FOLVITE) 1 mg tablet Take 1 Tab by mouth daily. 90 Tab 1    calcitRIOL (ROCALTROL) 0.25 mcg capsule Take 1 Cap by mouth daily. 90 Cap 1    triamcinolone acetonide (KENALOG) 0.1 % ointment Apply  to affected area two (2) times a day. use thin layer 30 g 0    aspirin 81 mg tablet Take 81 mg by mouth.          Allergies   Allergen Reactions    Percocet [Oxycodone-Acetaminophen] Itching

## 2020-04-16 ENCOUNTER — TELEPHONE (OUTPATIENT)
Dept: FAMILY MEDICINE CLINIC | Age: 70
End: 2020-04-16

## 2020-04-16 NOTE — TELEPHONE ENCOUNTER
Returned phone call to patient, no answer. She would need to have a specific lab ordered and a blood draw to know what her blood type is.

## 2020-04-23 DIAGNOSIS — K21.9 GASTROESOPHAGEAL REFLUX DISEASE WITHOUT ESOPHAGITIS: ICD-10-CM

## 2020-04-24 RX ORDER — ESOMEPRAZOLE MAGNESIUM 40 MG/1
CAPSULE, DELAYED RELEASE ORAL
Qty: 90 CAP | Refills: 0 | Status: SHIPPED | OUTPATIENT
Start: 2020-04-24 | End: 2020-10-05 | Stop reason: SDUPTHER

## 2020-06-08 DIAGNOSIS — M25.561 RIGHT KNEE PAIN, UNSPECIFIED CHRONICITY: ICD-10-CM

## 2020-06-08 DIAGNOSIS — I25.10 CORONARY ARTERY DISEASE DUE TO LIPID RICH PLAQUE: ICD-10-CM

## 2020-06-08 DIAGNOSIS — M25.512 ACUTE PAIN OF LEFT SHOULDER: ICD-10-CM

## 2020-06-08 DIAGNOSIS — I25.83 CORONARY ARTERY DISEASE DUE TO LIPID RICH PLAQUE: ICD-10-CM

## 2020-06-08 DIAGNOSIS — F41.8 DEPRESSION WITH ANXIETY: ICD-10-CM

## 2020-06-09 ENCOUNTER — VIRTUAL VISIT (OUTPATIENT)
Dept: FAMILY MEDICINE CLINIC | Age: 70
End: 2020-06-09

## 2020-06-09 DIAGNOSIS — I10 ESSENTIAL HYPERTENSION: Primary | ICD-10-CM

## 2020-06-09 DIAGNOSIS — H54.3 BLINDNESS OF BOTH EYES: ICD-10-CM

## 2020-06-09 DIAGNOSIS — Z00.00 MEDICARE ANNUAL WELLNESS VISIT, SUBSEQUENT: ICD-10-CM

## 2020-06-09 DIAGNOSIS — F41.8 DEPRESSION WITH ANXIETY: ICD-10-CM

## 2020-06-09 DIAGNOSIS — E78.5 HYPERLIPIDEMIA, UNSPECIFIED HYPERLIPIDEMIA TYPE: ICD-10-CM

## 2020-06-09 DIAGNOSIS — Z13.31 SCREENING FOR DEPRESSION: ICD-10-CM

## 2020-06-09 DIAGNOSIS — Z13.39 SCREENING FOR ALCOHOLISM: ICD-10-CM

## 2020-06-09 DIAGNOSIS — F33.1 MODERATE EPISODE OF RECURRENT MAJOR DEPRESSIVE DISORDER (HCC): ICD-10-CM

## 2020-06-09 DIAGNOSIS — I25.83 CORONARY ARTERY DISEASE DUE TO LIPID RICH PLAQUE: ICD-10-CM

## 2020-06-09 DIAGNOSIS — I25.10 CORONARY ARTERY DISEASE DUE TO LIPID RICH PLAQUE: ICD-10-CM

## 2020-06-09 RX ORDER — GABAPENTIN 300 MG/1
300 CAPSULE ORAL
Qty: 90 CAP | Refills: 0 | Status: SHIPPED | OUTPATIENT
Start: 2020-06-09 | End: 2020-08-12

## 2020-06-09 RX ORDER — ATENOLOL 50 MG/1
50 TABLET ORAL DAILY
Qty: 90 TAB | Refills: 0 | Status: SHIPPED | OUTPATIENT
Start: 2020-06-09 | End: 2020-09-04 | Stop reason: SDUPTHER

## 2020-06-09 RX ORDER — LORAZEPAM 1 MG/1
1 TABLET ORAL
Qty: 60 TAB | Refills: 0 | Status: SHIPPED | OUTPATIENT
Start: 2020-06-09 | End: 2020-07-08 | Stop reason: SDUPTHER

## 2020-06-09 NOTE — PROGRESS NOTES
1. Have you been to the ER, urgent care clinic since your last visit? Hospitalized since your last visit? No    2. Have you seen or consulted any other health care providers outside of the [address] [place] [address] since your last visit? Include any pap smears or colon screening.  No      Health Maintenance Due   Topic Date Due    FOBT Q1Y Age,18+  04/22/1970    DTaP/Tdap/Td series (1 - Tdap) 04/22/1973    Shingrix Vaccine Age 50> (1 of 2) 04/22/2002    Bone Densitometry (Dexa) Screening  04/22/2017    Breast Cancer Screen Mammogram  05/20/2017    Medicare Yearly Exam  01/26/2020

## 2020-06-09 NOTE — PROGRESS NOTES
Burgess Jones is a 78 y.o. female evaluated via telephone on 20. Patient Identity confirmed by . Telephone encounter done in lieu of office visit due to extraordinary circumstances. A state of national and state emergency has been declared by the President and the [place] due to the [employer] pandemic. Pursuant to the emergency declaration under the [facility] [address] authority and the [employer] Act, this Virtual  Visit was conducted, with patient's consent, to reduce the patient's risk of exposure to COVID-19 and provide continuity of care for an established patient. Dana Lakhani LPN coordinated virtual visit    Consent:  Patient and/or health care decision maker is aware that he may receive a bill for this telephone encounter, depending on his insurance coverage, and has provided verbal consent to proceed: Yes    Physician Location: Office  Patient Location: Home    CC: chronic health concerns  Information gathered from patient and/or health care decision maker. HPI: Burgess Jones is a 78 y.o. female who was evaluated by synchronous (real-time) audio technology from her home. Patient scheduled for follow up of her chronic medical conditions. Patient with hx of CAD, HTN, HLD, GERD, B12 deficiency, blindness, anxiety and depression. Patient denies HA, dizziness, SOB, CP, abdominal pain, dysuria. She is due for lab work. Patient feeling good sans new complaints or concerns at this time.        Current Outpatient Medications:     clopidogreL (PLAVIX) 75 mg tab, TAKE ONE TABLET BY MOUTH EVERY DAY, Disp: 90 Tab, Rfl: 0    esomeprazole (NEXIUM) 40 mg capsule, TAKE ONE CAPSULE BY MOUTH EVERY DAY, Disp: 90 Cap, Rfl: 0    buPROPion XL (WELLBUTRIN XL) 150 mg tablet, TAKE 1 TABLET BY MOUTH EVERY MORNING, Disp: 30 Tab, Rfl: 2    albuterol (PROVENTIL HFA, VENTOLIN HFA, PROAIR HFA) 90 mcg/actuation inhaler, Take 2 Puffs by inhalation every four (4) hours as needed for Wheezing., Disp: 1 Inhaler, Rfl: 1    melatonin 3 mg tablet, Take 1 Tab by mouth nightly as needed for Insomnia or Sleep., Disp: 90 Tab, Rfl: 0    fexofenadine (ALLEGRA) 180 mg tablet, Take 1 Tab by mouth daily as needed for Allergies. , Disp: 30 Tab, Rfl: 1    simvastatin (ZOCOR) 40 mg tablet, Take 1 Tab by mouth nightly., Disp: 90 Tab, Rfl: 0    mirtazapine (REMERON) 7.5 mg tablet, Take 1 Tab by mouth nightly., Disp: 30 Tab, Rfl: 0    fluticasone propionate (FLONASE) 50 mcg/actuation nasal spray, USE 2 SPRAYS IN BOTH NOSTRILS DAILY, Disp: 16 g, Rfl: 2    furosemide (LASIX) 20 mg tablet, TAKE ONE TABLET BY MOUTH EVERY DAY, Disp: 90 Tab, Rfl: 0    miscellaneous medical supply (BLOOD PRESSURE CUFF) misc, Blood Pressure Cuff That Talks. , Disp: 1 Each, Rfl: 0    potassium chloride SA (MICRO-K) 10 mEq capsule, TAKE ONE CAPSULE BY MOUTH TWICE A DAY, Disp: 180 Cap, Rfl: 0    nitroglycerin (NITROSTAT) 0.4 mg SL tablet, 1 Tab by SubLINGual route as needed for Chest Pain., Disp: 1 Bottle, Rfl: 3    aspirin 81 mg tablet, Take 81 mg by mouth.  , Disp: , Rfl:     atenoloL (TENORMIN) 50 mg tablet, Take 1 Tab by mouth daily. , Disp: 90 Tab, Rfl: 0    LORazepam (ATIVAN) 1 mg tablet, Take 1 Tab by mouth every eight (8) hours as needed for Anxiety.  Max Daily Amount: 3 mg., Disp: 60 Tab, Rfl: 0    gabapentin (NEURONTIN) 300 mg capsule, Take 1 Cap by mouth three (3) times daily as needed for Pain., Disp: 90 Cap, Rfl: 0     Allergies   Allergen Reactions    Percocet [Oxycodone-Acetaminophen] Itching        Patient Active Problem List    Diagnosis Date Noted    Moderate episode of recurrent major depressive disorder ([facility] 10/17/2017    Gastroesophageal reflux disease without esophagitis 02/03/2016    Allergic rhinitis 10/04/2012    Insomnia 06/05/2012    B12 deficiency 11/04/2011    Osteopenia 06/03/2011    Anxiety 05/16/2011    Hyperlipidemia 05/16/2011    CAD (coronary artery disease) 05/16/2011    Blindness 05/16/2011    HTN (hypertension) 05/16/2011        Review of Systems:  Constitutional: Negative for fatigue, malaise  Resp: Negative for cough, wheezing or SOB  CV: Negative for chest pain, dizziness or palpitations  GI: Negative for nausea or abdominal pain  MS: Negative for acute myalgias or arthralgias   Neuro: Negative for HA, weakness or paresthesia  Psych: Positive for anxiety       Assessment/Plan:  Details of this discussion including any medical advice provided: Patient advised as a precaution to self isolate at home, practice regular hand washing with soap and warm water and to wear a mask and utilize social distancing when necessary to be out in public places. ICD-10-CM ICD-9-CM    1. Essential hypertension Z55 345.9 METABOLIC PANEL, COMPREHENSIVE      TSH 3RD GENERATION   2. Coronary artery disease due to lipid rich plaque I25.10 414.00 LIPID PANEL    Q36.61 004.4 METABOLIC PANEL, COMPREHENSIVE      TSH 3RD GENERATION      CBC W/O DIFF      MAGNESIUM   3. Hyperlipidemia, unspecified hyperlipidemia type E78.5 272.4 LIPID PANEL      METABOLIC PANEL, COMPREHENSIVE   4. Moderate episode of recurrent major depressive disorder (HCC) F33.1 296.32    5. Depression with anxiety F41.8 300.4    6. Blindness of both eyes H54.3 369.00      Continue current prescribed medications as written. No medication changes at this time. Symptomatic therapy suggested: call prn if symptoms persist or worsen. Total Time: minutes: 21-30 minutes was spent on phone discussing above problems and plan. Patient medical history, prior OV notes, vitals flow sheet, lab results, medications, and allergies were reviewed during this encounter.     For phone encounters:  I affirm this is a patient initiated episode with an established Patient who has not had a related appointment within my department in the past 7 days or scheduled within the next 24 hours. Note: not billable if this call serves to triage the patient into an appointment for the relevant concern    MD LINA Azar & AVELINA REDMAN [facility]  06/11/20     The following Annual Medicare Wellness Exam is distinct and separate from the medical evaluation and decision making. This is the Subsequent Medicare Annual Wellness Exam, performed 12 months or more after the Initial AWV or the last Subsequent AWV    Consent: Estivenjuan Inman, who was seen by synchronous (real-time) audio only technology, and/or her healthcare decision maker, is aware that this patient-initiated, Telehealth encounter on 6/9/2020 is a billable service. While AWVs are fully covered by Medicare, any services rendered on this date that are not included in an AWV are subject to additional billing, with coverage as determined by her insurance carrier. She is aware that she may receive a bill for any such additional services and has provided verbal consent to proceed: Yes. I have reviewed the patient's medical history in detail and updated the computerized patient record.      History     Patient Active Problem List   Diagnosis Code    Anxiety F41.9    Hyperlipidemia E78.5    CAD (coronary artery disease) I25.10    Blindness H54.7    HTN (hypertension) I10    Osteopenia M85.80    B12 deficiency E53.8    Insomnia G47.00    Allergic rhinitis J30.9    Gastroesophageal reflux disease without esophagitis K21.9    Moderate episode of recurrent major depressive disorder ([facility] [address] F33.1     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- Kevin- has GRIFFIN      Past Surgical History:   Procedure Laterality Date    CABG, VEIN, FIVE  2006    CARDIAC SURG PROCEDURE UNLIST      HX ORTHOPAEDIC       Current Outpatient Medications   Medication Sig Dispense Refill    clopidogreL (PLAVIX) 75 mg tab TAKE ONE TABLET BY MOUTH EVERY DAY 90 Tab 0    esomeprazole (NEXIUM) 40 mg capsule TAKE ONE CAPSULE BY MOUTH EVERY DAY 90 Cap 0    buPROPion XL (WELLBUTRIN XL) 150 mg tablet TAKE 1 TABLET BY MOUTH EVERY MORNING 30 Tab 2    albuterol (PROVENTIL HFA, VENTOLIN HFA, PROAIR HFA) 90 mcg/actuation inhaler Take 2 Puffs by inhalation every four (4) hours as needed for Wheezing. 1 Inhaler 1    melatonin 3 mg tablet Take 1 Tab by mouth nightly as needed for Insomnia or Sleep. 90 Tab 0    fexofenadine (ALLEGRA) 180 mg tablet Take 1 Tab by mouth daily as needed for Allergies. 30 Tab 1    simvastatin (ZOCOR) 40 mg tablet Take 1 Tab by mouth nightly. 90 Tab 0    mirtazapine (REMERON) 7.5 mg tablet Take 1 Tab by mouth nightly. 30 Tab 0    fluticasone propionate (FLONASE) 50 mcg/actuation nasal spray USE 2 SPRAYS IN BOTH NOSTRILS DAILY 16 g 2    furosemide (LASIX) 20 mg tablet TAKE ONE TABLET BY MOUTH EVERY DAY 90 Tab 0    miscellaneous medical supply (BLOOD PRESSURE CUFF) misc Blood Pressure Cuff That Talks. 1 Each 0    potassium chloride SA (MICRO-K) 10 mEq capsule TAKE ONE CAPSULE BY MOUTH TWICE A  Cap 0    nitroglycerin (NITROSTAT) 0.4 mg SL tablet 1 Tab by SubLINGual route as needed for Chest Pain. 1 Bottle 3    aspirin 81 mg tablet Take 81 mg by mouth.  atenoloL (TENORMIN) 50 mg tablet Take 1 Tab by mouth daily. 90 Tab 0    LORazepam (ATIVAN) 1 mg tablet Take 1 Tab by mouth every eight (8) hours as needed for Anxiety. Max Daily Amount: 3 mg. 60 Tab 0    gabapentin (NEURONTIN) 300 mg capsule Take 1 Cap by mouth three (3) times daily as needed for Pain.  90 Cap 0     Allergies   Allergen Reactions    Percocet [Oxycodone-Acetaminophen] Itching       Family History   Problem Relation Age of Onset    No Known Problems Mother     No Known Problems Father      Social History     Tobacco Use    Smoking status: Never Smoker    Smokeless tobacco: Never Used   Substance Use Topics    Alcohol use: Yes     Comment: social       Depression Risk Factor Screening:     3 most recent PHQ Screens 10/17/2017   PHQ Not Done Active Diagnosis of Depression or Bipolar Disorder   Little interest or pleasure in doing things Several days   Feeling down, depressed, irritable, or hopeless Not at all   Total Score PHQ 2 1       Alcohol Risk Factor Screening:   Do you average 1 drink per night or more than 7 drinks a week:  No    On any one occasion in the past three months have you have had more than 3 drinks containing alcohol:  No      Functional Ability and Level of Safety:   Hearing: Hearing is good. Activities of Daily Living: The home contains: no safety equipment. Patient does total self care    Ambulation: with no difficulty    Fall Risk:  Fall Risk Assessment, last 12 mths 3/12/2020   Able to walk? Yes   Fall in past 12 months? No       Abuse Screen:  Patient is not abused    Cognitive Screening   Has your family/caregiver stated any concerns about your memory: no      Patient Care Team   Patient Care Team:  Lili Walters MD as PCP - General (Family Practice)  Lili Walters MD as PCP [facility]  Toby Guajardo MD (Cardiology)  Phillip Leroy MD (Orthopedic Surgery)    Assessment/Plan   Education and counseling provided:  Are appropriate based on today's review and evaluation    Diagnoses and all orders for this visit:    1. Medicare annual wellness visit, subsequent    2. Screening for depression  -     DEPRESSION SCREEN ANNUAL    3. Screening for alcoholism  -     [place] ANNUAL ALCOHOL SCREEN 15 MIN      Health Maintenance Due   Topic Date Due    FOBT Q1Y Age,18+  04/[birth year]1970    Breast Cancer Screen Mammogram  05/20/2017    Patient declines the above. Yasmeen Thomas is a 78 y.o. female who was evaluated by an audio only encounter for concerns as above. Patient identification was verified prior to start of the visit.  Due to this being a TeleHealth encounter (During TASAF-81 public health emergency) physical exam could not be performed. Pursuant to the emergency declaration under the [address] [place] [address], [facility] waiver authority and the [email or web address] and [employer] Act, this Virtual Visit was conducted, with patient's (and/or legal guardian's) consent, to reduce the patient's risk of exposure to COVID-19 and provide necessary medical care. Services were provided through a synchronous discussion virtually to substitute for in-person clinic visit. I was at home. The patient was at home.       Parris Davis MD

## 2020-06-09 NOTE — PATIENT INSTRUCTIONS
Medicare Wellness Visit, Female The best way to live healthy is to have a lifestyle where you eat a well-balanced diet, exercise regularly, limit alcohol use, and quit all forms of tobacco/nicotine, if applicable. Regular preventive services are another way to keep healthy. Preventive services (vaccines, screening tests, monitoring & exams) can help personalize your care plan, which helps you manage your own care. Screening tests can find health problems at the earliest stages, when they are easiest to treat. [facility] follows the current, evidence-based guidelines published by the [facility] ([facility]) when recommending preventive services for our patients. Because we follow these guidelines, sometimes recommendations change over time as research supports it. (For example, mammograms used to be recommended annually. Even though Medicare will still pay for an annual mammogram, the newer guidelines recommend a mammogram every two years for women of average risk). Of course, you and your doctor may decide to screen more often for some diseases, based on your risk and your co-morbidities (chronic disease you are already diagnosed with). Preventive services for you include: - Medicare offers their members a free annual wellness visit, which is time for you and your primary care provider to discuss and plan for your preventive service needs. Take advantage of this benefit every year! 
-All adults over the age of 72 should receive the recommended pneumonia vaccines. Current USPSTF guidelines recommend a series of two vaccines for the best pneumonia protection.  
-All adults should have a flu vaccine yearly and a tetanus vaccine every 10 years.  
-All adults age 48 and older should receive the shingles vaccines (series of two vaccines). -All adults age 38-68 who are overweight should have a diabetes screening test once every three years. -All adults born between [birth year] and 1963 should be screened once for Hepatitis C. 
-Other screening tests and preventive services for persons with diabetes include: an eye exam to screen for diabetic retinopathy, a kidney function test, a foot exam, and stricter control over your cholesterol.  
-Cardiovascular screening for adults with routine risk involves an electrocardiogram (ECG) at intervals determined by your doctor.  
-Colorectal cancer screenings should be done for adults age 54-65 with no increased risk factors for colorectal cancer. There are a number of acceptable methods of screening for this type of cancer. Each test has its own benefits and drawbacks. Discuss with your doctor what is most appropriate for you during your annual wellness visit. The different tests include: colonoscopy (considered the best screening method), a fecal occult blood test, a fecal DNA test, and sigmoidoscopy. 
 
-A bone mass density test is recommended when a woman turns 65 to screen for osteoporosis. This test is only recommended one time, as a screening. Some providers will use this same test as a disease monitoring tool if you already have osteoporosis. -Breast cancer screenings are recommended every other year for women of normal risk, age 54-69. 
-Cervical cancer screenings for women over age 72 are only recommended with certain risk factors. Here is a list of your current Health Maintenance items (your personalized list of preventive services) with a due date: 
Health Maintenance Due Topic Date Due  Stool testing for trace blood  04[birth year]1970  
 DTaP/Tdap/Td  (1 - Tdap) 04/22/1973  Shingles Vaccine (1 of 2) 04/22/2002  Bone Mineral Density   04/22/2017  Mammogram  05/20/2017 [facility] Annual Well Visit  01/26/2020

## 2020-06-15 ENCOUNTER — HOSPITAL ENCOUNTER (OUTPATIENT)
Dept: LAB | Age: 70
Discharge: HOME | End: 2020-06-15

## 2020-06-15 DIAGNOSIS — I25.10 CORONARY ARTERY DISEASE DUE TO LIPID RICH PLAQUE: ICD-10-CM

## 2020-06-15 DIAGNOSIS — E78.5 HYPERLIPIDEMIA, UNSPECIFIED HYPERLIPIDEMIA TYPE: ICD-10-CM

## 2020-06-15 DIAGNOSIS — I25.83 CORONARY ARTERY DISEASE DUE TO LIPID RICH PLAQUE: ICD-10-CM

## 2020-06-15 DIAGNOSIS — I10 ESSENTIAL HYPERTENSION: ICD-10-CM

## 2020-06-15 LAB
ALBUMIN SERPL-MCNC: 3.9 G/DL (ref 3.5–5)
ALBUMIN/GLOB SERPL: 1.1 {RATIO} (ref 1.1–2.2)
ALP SERPL-CCNC: 78 U/L (ref 45–117)
ALT SERPL-CCNC: 19 U/L (ref 12–78)
ANION GAP SERPL CALC-SCNC: 4 MMOL/L (ref 5–15)
AST SERPL-CCNC: 14 U/L (ref 15–37)
BILIRUB SERPL-MCNC: 0.3 MG/DL (ref 0.2–1)
BUN SERPL-MCNC: 14 MG/DL (ref 6–20)
BUN/CREAT SERPL: 15 (ref 12–20)
CALCIUM SERPL-MCNC: 9.4 MG/DL (ref 8.5–10.1)
CHLORIDE SERPL-SCNC: 109 MMOL/L (ref 97–108)
CHOLEST SERPL-MCNC: 181 MG/DL
CO2 SERPL-SCNC: 28 MMOL/L (ref 21–32)
CREAT SERPL-MCNC: 0.93 MG/DL (ref 0.55–1.02)
ERYTHROCYTE [DISTWIDTH] IN BLOOD BY AUTOMATED COUNT: 12.5 % (ref 11.5–14.5)
GLOBULIN SER CALC-MCNC: 3.5 G/DL (ref 2–4)
GLUCOSE SERPL-MCNC: 93 MG/DL (ref 65–100)
HCT VFR BLD AUTO: 43.6 % (ref 35–47)
HDLC SERPL-MCNC: 56 MG/DL
HDLC SERPL: 3.2 {RATIO} (ref 0–5)
HGB BLD-MCNC: 13.8 G/DL (ref 11.5–16)
LDLC SERPL CALC-MCNC: 93.8 MG/DL (ref 0–100)
LIPID PROFILE,FLP: ABNORMAL
MAGNESIUM SERPL-MCNC: 2.3 MG/DL (ref 1.6–2.4)
MCH RBC QN AUTO: 32.9 PG (ref 26–34)
MCHC RBC AUTO-ENTMCNC: 31.7 G/DL (ref 30–36.5)
MCV RBC AUTO: 104.1 FL (ref 80–99)
NRBC # BLD: 0 K/UL (ref 0–0.01)
NRBC BLD-RTO: 0 PER 100 WBC
PLATELET # BLD AUTO: 177 K/UL (ref 150–400)
PMV BLD AUTO: 10.9 FL (ref 8.9–12.9)
POTASSIUM SERPL-SCNC: 4.6 MMOL/L (ref 3.5–5.1)
PROT SERPL-MCNC: 7.4 G/DL (ref 6.4–8.2)
RBC # BLD AUTO: 4.19 M/UL (ref 3.8–5.2)
SODIUM SERPL-SCNC: 141 MMOL/L (ref 136–145)
TRIGL SERPL-MCNC: 156 MG/DL (ref ?–150)
TSH SERPL DL<=0.05 MIU/L-ACNC: 2.29 UIU/ML (ref 0.36–3.74)
VLDLC SERPL CALC-MCNC: 31.2 MG/DL
WBC # BLD AUTO: 6.5 K/UL (ref 3.6–11)

## 2020-06-18 ENCOUNTER — TELEPHONE (OUTPATIENT)
Dept: FAMILY MEDICINE CLINIC | Age: 70
End: 2020-06-18

## 2020-06-19 DIAGNOSIS — E78.5 HYPERLIPIDEMIA, UNSPECIFIED HYPERLIPIDEMIA TYPE: ICD-10-CM

## 2020-06-19 DIAGNOSIS — I25.83 CORONARY ARTERY DISEASE DUE TO LIPID RICH PLAQUE: ICD-10-CM

## 2020-06-19 DIAGNOSIS — I25.10 CORONARY ARTERY DISEASE DUE TO LIPID RICH PLAQUE: ICD-10-CM

## 2020-06-21 RX ORDER — SIMVASTATIN 40 MG/1
40 TABLET, FILM COATED ORAL
Qty: 90 TAB | Refills: 1 | Status: SHIPPED | OUTPATIENT
Start: 2020-06-21 | End: 2020-09-11 | Stop reason: SDUPTHER

## 2020-06-29 ENCOUNTER — TELEPHONE (OUTPATIENT)
Dept: FAMILY MEDICINE CLINIC | Age: 70
End: 2020-06-29

## 2020-06-29 NOTE — TELEPHONE ENCOUNTER
----- Message from Mitch Pichardo sent at 6/29/2020 12:59 PM EDT -----  Regarding: Dr. Nohemy Jackson  Caller: pt    Reason: The patient would like to speak with Luisa Reddy about a private matter.      Best contact number(s): [phone]

## 2020-07-02 ENCOUNTER — VIRTUAL VISIT (OUTPATIENT)
Dept: FAMILY MEDICINE CLINIC | Age: 70
End: 2020-07-02

## 2020-07-02 DIAGNOSIS — A60.9 HSV (HERPES SIMPLEX VIRUS) ANOGENITAL INFECTION: Primary | ICD-10-CM

## 2020-07-02 DIAGNOSIS — F41.9 ANXIETY: ICD-10-CM

## 2020-07-02 RX ORDER — ACYCLOVIR 400 MG/1
400 TABLET ORAL 3 TIMES DAILY
Qty: 30 TAB | Refills: 0 | Status: SHIPPED | OUTPATIENT
Start: 2020-07-02 | End: 2020-07-12

## 2020-07-02 RX ORDER — ESCITALOPRAM OXALATE 10 MG/1
TABLET ORAL
Qty: 30 TAB | Refills: 1 | Status: SHIPPED | OUTPATIENT
Start: 2020-07-02 | End: 2020-10-26 | Stop reason: ALTCHOICE

## 2020-07-02 NOTE — PROGRESS NOTES
Cheryl Angulo  78 y.o. female  1950  [facility]  [facility]  [id number]    [phone] (home)      [facility] [address]:    Telephone Encounter  Phoebe Rosario MD       Encounter Date: 7/2/2020 at 2:14 PM    Consent: Cheryl Angulo, who was seen by synchronous (real-time) audio only technology, and/or her healthcare decision maker, is aware that this patient-initiated, Telehealth encounter on 7/2/2020 is a billable service, with coverage as determined by her insurance carrier. She is aware that she may receive a bill and has provided verbal consent to proceed: Yes. Chief Complaint   Patient presents with    Rash       History of Present Illness   Chyna Robles is a 78 y.o. female was evaluated by telephone. I communicated with the patient and/or health care decision maker about her breakout. Pt states that years ago, she fell into a lake, so they did blood work. They found that she was positive for HSV at that time. Pt feels that she has had small blisters in the past, but she hasn't had a breakout like this before. She usually only has a couple of blisters. This has been going on for weeks now. Pt feels like it is flaring up more. The blisters create a burning sensation, but she also is itching. She feels the blisters when she goes to wash them. Pt is not having any vaginal discharge. Pt reports her previous boyfriend had genital herpes. Pt has an appointment at [facility] on [facility]/27 to have a pap smear. Pt states that she is on ativan and wellbutrin for her anxiety. She reports that with the [place] pandemic going on, she is stuck in her home. She feels like she constantly worrying. She does not feel like the wellbutrin is working well for her. She is not taking the mirtazapine. She reports she was previously prescribed effexor, but she did not continue this as it made her nauseous.     Review of Systems   Review of Systems Constitutional: Negative for chills, fever and malaise/fatigue. HENT: Negative for congestion. Skin: Positive for itching and rash. Positive for blisters and burning sensation   Neurological: Negative for dizziness and headaches. Psychiatric/Behavioral: Negative for depression, hallucinations, memory loss, substance abuse and suicidal ideas. The patient is nervous/anxious. The patient does not have insomnia. Vitals/Objective:   General: Patient speaking in complete sentences without effort. Normal speech and cooperative. Due to this being a Virtual Check-in/Telephone evaluation, many elements of the physical examination are unable to be assessed. Assessment and Plan:   Time-based coding, delete if not needed: I spent at least 25 minutes with this established patient, and >50% of the time was spent counseling and/or coordinating care regarding HSV outbreak and anxiety  Total Time: minutes: 21-30 minutes      1. HSV (herpes simplex virus) anogenital infection - pt has pap appt scheduled for end of month with gynecology. Pt reports that she has had exposure and previous outbreaks of HSV. She reports testing positive for this on blood work for unrelated hospitalization.   - Will treat with acyclovir for 10 days  - Asked pt to call if the rash gets worse or does not improve over course of medication    2. Anxiety - pt reports that the wellbutrin is not working well for her anxiety. She has not tried any other medication. It appears that the current pandemic is worsening her symptoms. - Will discontinue wellbutrin as she feels this is not helping her symptoms at all. Discussed taking half pill of wellbutrin for one week to wean off while titrating lexapro. - Start lexapro 10 mg. Advised to start with 5 mg daily for 1 week and then increase to 10 mg by second week.   - Discussed black box warning and asked pt to call or go to closest ED if she experiences any suicidal thoughts        We discussed the expected course, resolution and complications of the diagnosis(es) in detail. Medication risks, benefits, costs, interactions, and alternatives were discussed as indicated. I advised her to contact the office if her condition worsens, changes or fails to improve as anticipated. She expressed understanding with the diagnosis(es) and plan. Patient understands that this encounter was a temporary measure, and the importance of further follow up and examination was emphasized. Patient verbalized understanding. Patient informed to follow up: 1 month    I affirm this is a Patient Initiated Episode with an Established Patient who has not had a related appointment within my department in the past 7 days or scheduled within the next 24 hours. Note: not billable if this call serves to triage the patient into an appointment for the relevant concern      Electronically Signed: Gretchen Alba MD  Providers location when delivering service: office        ICD-10-CM ICD-9-CM    1. HSV (herpes simplex virus) anogenital infection A60.9 054.9    2. Anxiety F41.9 300.00        Pursuant to the emergency declaration under the [facility], [facility] waiver authority and the [employer] Act, this Virtual  Visit was conducted, with patient's consent, to reduce the patient's risk of exposure to COVID-19 and provide continuity of care for an established patient. History   Patients past medical, surgical and family histories were personally reviewed and updated.       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     Past Surgical History:   Procedure Laterality Date    CABG, VEIN, FIVE  2006    CARDIAC SURG PROCEDURE UNLIST       ORTHOPAEDIC       Family History   Problem Relation Age of Onset    No Known Problems Mother     No Known Problems Father      Social History     Socioeconomic History    Marital status:      Spouse name: Not on file    Number of children: Not on file    Years of education: Not on file    Highest education level: Not on file   Occupational History    Not on file   Social Needs    Financial resource strain: Not on file    Food insecurity     Worry: Not on file     Inability: Not on file    Transportation needs     Medical: Not on file     Non-medical: Not on file   Tobacco Use    Smoking status: Never Smoker    Smokeless tobacco: Never Used   Substance and Sexual Activity    Alcohol use: Yes     Comment: social    Drug use: No    Sexual activity: Yes     Partners: Male     Birth control/protection: None   Lifestyle    Physical activity     Days per week: Not on file     Minutes per session: Not on file    Stress: Not on file   Relationships    Social connections     Talks on phone: Not on file     Gets together: Not on file     Attends Judaism service: Not on file     Active member of club or organization: Not on file     Attends meetings of clubs or organizations: Not on file     Relationship status: Not on file    Intimate partner violence     Fear of current or ex partner: Not on file     Emotionally abused: Not on file     Physically abused: Not on file     Forced sexual activity: Not on file   Other Topics Concern    Not on file   Social History Narrative    Not on file            Current Medications/Allergies   Medications and Allergies reviewed:    Current Outpatient Medications   Medication Sig Dispense Refill    escitalopram oxalate (LEXAPRO) 10 mg tablet Take half tablet (0.5 mg) daily for 1 week. Increase to a full tablet (10 mg) after that until further notice. 30 Tab 1    acyclovir (ZOVIRAX) 400 mg tablet Take 1 Tab by mouth three (3) times daily for 10 days.  30 Tab 0    simvastatin (ZOCOR) 40 mg tablet Take 1 Tab by mouth nightly. 90 Tab 1    atenoloL (TENORMIN) 50 mg tablet Take 1 Tab by mouth daily. 90 Tab 0    LORazepam (ATIVAN) 1 mg tablet Take 1 Tab by mouth every eight (8) hours as needed for Anxiety. Max Daily Amount: 3 mg. 60 Tab 0    gabapentin (NEURONTIN) 300 mg capsule Take 1 Cap by mouth three (3) times daily as needed for Pain. 90 Cap 0    clopidogreL (PLAVIX) 75 mg tab TAKE ONE TABLET BY MOUTH EVERY DAY 90 Tab 0    esomeprazole (NEXIUM) 40 mg capsule TAKE ONE CAPSULE BY MOUTH EVERY DAY 90 Cap 0    albuterol (PROVENTIL HFA, VENTOLIN HFA, PROAIR HFA) 90 mcg/actuation inhaler Take 2 Puffs by inhalation every four (4) hours as needed for Wheezing. 1 Inhaler 1    melatonin 3 mg tablet Take 1 Tab by mouth nightly as needed for Insomnia or Sleep. 90 Tab 0    fexofenadine (ALLEGRA) 180 mg tablet Take 1 Tab by mouth daily as needed for Allergies. 30 Tab 1    fluticasone propionate (FLONASE) 50 mcg/actuation nasal spray USE 2 SPRAYS IN BOTH NOSTRILS DAILY 16 g 2    furosemide (LASIX) 20 mg tablet TAKE ONE TABLET BY MOUTH EVERY DAY 90 Tab 0    miscellaneous medical supply (BLOOD PRESSURE CUFF) misc Blood Pressure Cuff That Talks. 1 Each 0    potassium chloride SA (MICRO-K) 10 mEq capsule TAKE ONE CAPSULE BY MOUTH TWICE A  Cap 0    nitroglycerin (NITROSTAT) 0.4 mg SL tablet 1 Tab by SubLINGual route as needed for Chest Pain. 1 Bottle 3    aspirin 81 mg tablet Take 81 mg by mouth.          Allergies   Allergen Reactions    Percocet [Oxycodone-Acetaminophen] Itching

## 2020-07-08 DIAGNOSIS — F41.8 DEPRESSION WITH ANXIETY: ICD-10-CM

## 2020-07-10 RX ORDER — LORAZEPAM 1 MG/1
1 TABLET ORAL
Qty: 60 TAB | Refills: 0 | Status: SHIPPED | OUTPATIENT
Start: 2020-07-10 | End: 2020-08-06

## 2020-07-16 NOTE — PROGRESS NOTES
[address] Medicine Residency Attending Addendum:  Dr. Maria D Miles MD,  the patient and I were not physically present during this encounter. The resident and I are concurrently monitoring the patient care through appropriate telecommunication technology. I discussed the findings, assessment and plan with the resident and agree with the resident's findings and plan as documented in the resident's note.       Roxanne Garcia MD

## 2020-08-03 ENCOUNTER — TELEPHONE (OUTPATIENT)
Dept: FAMILY MEDICINE CLINIC | Age: 70
End: 2020-08-03

## 2020-08-03 NOTE — TELEPHONE ENCOUNTER
Pt went to the [facility] in [place]. She want you to get the records. They told her you would have to request them. Dr. Char Rodriguez  and then ask for a transfer.

## 2020-09-04 DIAGNOSIS — I25.83 CORONARY ARTERY DISEASE DUE TO LIPID RICH PLAQUE: ICD-10-CM

## 2020-09-04 DIAGNOSIS — I25.10 CORONARY ARTERY DISEASE DUE TO LIPID RICH PLAQUE: ICD-10-CM

## 2020-09-04 DIAGNOSIS — I10 ESSENTIAL HYPERTENSION: ICD-10-CM

## 2020-09-04 DIAGNOSIS — F41.8 DEPRESSION WITH ANXIETY: ICD-10-CM

## 2020-09-08 RX ORDER — FUROSEMIDE 20 MG/1
TABLET ORAL
Qty: 90 TAB | Refills: 0 | Status: SHIPPED | OUTPATIENT
Start: 2020-09-08 | End: 2021-06-08

## 2020-09-08 RX ORDER — ATENOLOL 50 MG/1
50 TABLET ORAL DAILY
Qty: 90 TAB | Refills: 0 | Status: SHIPPED | OUTPATIENT
Start: 2020-09-08 | End: 2020-12-01 | Stop reason: SDUPTHER

## 2020-09-08 NOTE — TELEPHONE ENCOUNTER
Pt states she had an VV with Dr. Joon Mendoza on June 09. He stated she would not need to be seen again until 2021. Please call Pt on her refill for the LORAZEPAM.    She did not even receive the 30 either.

## 2020-09-08 NOTE — TELEPHONE ENCOUNTER
Appointment scheduled for 9/11/2020 -- this was the first available. She has enough medication to last until tomorrow night. She says that she went to [place] and had a pap smear and bloodwork. Do you know if you've seen the records? If not, we can request them.

## 2020-09-09 RX ORDER — LORAZEPAM 1 MG/1
TABLET ORAL
Qty: 60 TAB | Refills: 0 | OUTPATIENT
Start: 2020-09-09

## 2020-09-11 ENCOUNTER — VIRTUAL VISIT (OUTPATIENT)
Dept: FAMILY MEDICINE CLINIC | Age: 70
End: 2020-09-11
Payer: MEDICARE

## 2020-09-11 DIAGNOSIS — Z00.00 MEDICARE ANNUAL WELLNESS VISIT, SUBSEQUENT: ICD-10-CM

## 2020-09-11 DIAGNOSIS — I25.83 CORONARY ARTERY DISEASE DUE TO LIPID RICH PLAQUE: ICD-10-CM

## 2020-09-11 DIAGNOSIS — F41.8 DEPRESSION WITH ANXIETY: Primary | ICD-10-CM

## 2020-09-11 DIAGNOSIS — E78.5 HYPERLIPIDEMIA, UNSPECIFIED HYPERLIPIDEMIA TYPE: ICD-10-CM

## 2020-09-11 DIAGNOSIS — I25.10 CORONARY ARTERY DISEASE DUE TO LIPID RICH PLAQUE: ICD-10-CM

## 2020-09-11 DIAGNOSIS — M25.561 RIGHT KNEE PAIN, UNSPECIFIED CHRONICITY: ICD-10-CM

## 2020-09-11 DIAGNOSIS — Z13.39 SCREENING FOR ALCOHOLISM: ICD-10-CM

## 2020-09-11 PROCEDURE — 99442 PR PHYS/QHP TELEPHONE EVALUATION 11-20 MIN: CPT | Performed by: FAMILY MEDICINE

## 2020-09-11 PROCEDURE — G0439 PPPS, SUBSEQ VISIT: HCPCS | Performed by: FAMILY MEDICINE

## 2020-09-11 RX ORDER — SIMVASTATIN 40 MG/1
40 TABLET, FILM COATED ORAL
Qty: 90 TAB | Refills: 1 | Status: SHIPPED | OUTPATIENT
Start: 2020-09-11 | End: 2020-12-11 | Stop reason: SDUPTHER

## 2020-09-11 RX ORDER — GABAPENTIN 300 MG/1
CAPSULE ORAL
Qty: 90 CAP | Refills: 2 | Status: SHIPPED | OUTPATIENT
Start: 2020-09-11 | End: 2020-12-11 | Stop reason: SDUPTHER

## 2020-09-11 NOTE — PROGRESS NOTES
1. Have you been to the ER, urgent care clinic, or been hospitalized since your last visit? No     2. Have you seen or consulted any other health care providers outside of the [address] [place] [address] since your last visit? No     Reviewed record in preparation for visit and have necessary documentation  Goals that were addressed and/or need to be completed during or after this appointment include   Health Maintenance Due   Topic Date Due    FOBT Q1Y Age,18+  04/22/1970    Breast Cancer Screen Mammogram  05/20/2017    Medicare Yearly Exam  01/26/2020    Flu Vaccine (1) 09/01/2020    GLAUCOMA SCREENING Q2Y  10/01/2020       I have received verbal consent from Amy Claire to discuss any/all medical information while others present in the room.

## 2020-09-11 NOTE — PATIENT INSTRUCTIONS
Medicare Wellness Visit, Female The best way to live healthy is to have a lifestyle where you eat a well-balanced diet, exercise regularly, limit alcohol use, and quit all forms of tobacco/nicotine, if applicable. Regular preventive services are another way to keep healthy. Preventive services (vaccines, screening tests, monitoring & exams) can help personalize your care plan, which helps you manage your own care. Screening tests can find health problems at the earliest stages, when they are easiest to treat. [facility] follows the current, evidence-based guidelines published by the [facility] ([facility]) when recommending preventive services for our patients. Because we follow these guidelines, sometimes recommendations change over time as research supports it. (For example, mammograms used to be recommended annually. Even though Medicare will still pay for an annual mammogram, the newer guidelines recommend a mammogram every two years for women of average risk). Of course, you and your doctor may decide to screen more often for some diseases, based on your risk and your co-morbidities (chronic disease you are already diagnosed with). Preventive services for you include: - Medicare offers their members a free annual wellness visit, which is time for you and your primary care provider to discuss and plan for your preventive service needs. Take advantage of this benefit every year! 
-All adults over the age of 72 should receive the recommended pneumonia vaccines. Current USPSTF guidelines recommend a series of two vaccines for the best pneumonia protection.  
-All adults should have a flu vaccine yearly and a tetanus vaccine every 10 years.  
-All adults age 48 and older should receive the shingles vaccines (series of two vaccines). -All adults age 38-68 who are overweight should have a diabetes screening test once every three years. -All adults born between [birth year] and 1965 should be screened once for Hepatitis C. 
-Other screening tests and preventive services for persons with diabetes include: an eye exam to screen for diabetic retinopathy, a kidney function test, a foot exam, and stricter control over your cholesterol.  
-Cardiovascular screening for adults with routine risk involves an electrocardiogram (ECG) at intervals determined by your doctor.  
-Colorectal cancer screenings should be done for adults age 54-65 with no increased risk factors for colorectal cancer. There are a number of acceptable methods of screening for this type of cancer. Each test has its own benefits and drawbacks. Discuss with your doctor what is most appropriate for you during your annual wellness visit. The different tests include: colonoscopy (considered the best screening method), a fecal occult blood test, a fecal DNA test, and sigmoidoscopy. 
 
-A bone mass density test is recommended when a woman turns 65 to screen for osteoporosis. This test is only recommended one time, as a screening. Some providers will use this same test as a disease monitoring tool if you already have osteoporosis. -Breast cancer screenings are recommended every other year for women of normal risk, age 54-69. 
-Cervical cancer screenings for women over age 72 are only recommended with certain risk factors. Here is a list of your current Health Maintenance items (your personalized list of preventive services) with a due date: 
Health Maintenance Due Topic Date Due  Stool testing for trace blood  04/22/1970  Mammogram  05/20/2017 [facility] Annual Well Visit  01/26/2020  Yearly Flu Vaccine (1) 09/01/2020  Glaucoma Screening   10/01/2020

## 2020-09-11 NOTE — PROGRESS NOTES
Kyle Rodriguez is a 78 y.o. female evaluated via telephone on 20. Patient Identity confirmed by . Telephone encounter done in lieu of office visit due to extraordinary circumstances. A state of national and state emergency has been declared by the President and the [place] due to the [employer] pandemic. Pursuant to the emergency declaration under the [facility], [facility] waiver authority and the [employer] and [employer] Act, this Virtual  Visit was conducted, with patient's consent, to reduce the patient's risk of exposure to COVID-19 and provide continuity of care for an established patient. Ben Moreno LPN coordinated virtual visit    Consent:  Patient and/or health care decision maker is aware that he/she may receive a bill for this telephone encounter, depending on his insurance coverage, and has provided verbal consent to proceed: Yes    Physician Location: Office  Patient Location: Home    CC:   Information gathered from patient and/or health care decision maker. HPI: Kyle Rodriguez is a 78 y.o. female who was evaluated by synchronous (real-time) audio technology from his/her home. Patient scheduled for 3 month follow up. Patient with [facility]. Patient with hx of CAD, HTN, HLD, GERD, B12 deficiency, blindness, anxiety and depression. Patient denies HA, dizziness, SOB, CP, abdominal pain, dysuria. Patient feeling good sans new complaints or concerns at this time. Encounter Diagnoses   Name Primary?     Hyperlipidemia, unspecified hyperlipidemia type     Coronary artery disease due to lipid rich plaque     Right knee pain, unspecified chronicity          Current Outpatient Medications:     gabapentin (NEURONTIN) 300 mg capsule, TAKE ONE CAPSULE BY MOUTH 3 TIMES A DAY AS NEEDED FOR PAIN  Indications: \"change of life\" signs, Disp: 90 Cap, Rfl: 2    simvastatin (ZOCOR) 40 mg tablet, Take 1 Tab by mouth nightly., Disp: 90 Tab, Rfl: 1    LORazepam (ATIVAN) 1 mg tablet, TAKE ONE TABLET BY MOUTH EVERY 8 HOURS AS NEEDED FOR ANXIETY. MAX DAILY AMOUNT IS 3MG, Disp: 60 Tab, Rfl: 0    atenoloL (TENORMIN) 50 mg tablet, Take 1 Tab by mouth daily. , Disp: 90 Tab, Rfl: 0    furosemide (LASIX) 20 mg tablet, TAKE ONE TABLET BY MOUTH EVERY DAY, Disp: 90 Tab, Rfl: 0    clopidogreL (PLAVIX) 75 mg tab, TAKE ONE TABLET BY MOUTH EVERY DAY, Disp: 90 Tab, Rfl: 0    escitalopram oxalate (LEXAPRO) 10 mg tablet, Take half tablet (0.5 mg) daily for 1 week. Increase to a full tablet (10 mg) after that until further notice. , Disp: 30 Tab, Rfl: 1    esomeprazole (NEXIUM) 40 mg capsule, TAKE ONE CAPSULE BY MOUTH EVERY DAY, Disp: 90 Cap, Rfl: 0    albuterol (PROVENTIL HFA, VENTOLIN HFA, PROAIR HFA) 90 mcg/actuation inhaler, Take 2 Puffs by inhalation every four (4) hours as needed for Wheezing., Disp: 1 Inhaler, Rfl: 1    melatonin 3 mg tablet, Take 1 Tab by mouth nightly as needed for Insomnia or Sleep., Disp: 90 Tab, Rfl: 0    fexofenadine (ALLEGRA) 180 mg tablet, Take 1 Tab by mouth daily as needed for Allergies. , Disp: 30 Tab, Rfl: 1    fluticasone propionate (FLONASE) 50 mcg/actuation nasal spray, USE 2 SPRAYS IN BOTH NOSTRILS DAILY, Disp: 16 g, Rfl: 2    miscellaneous medical supply (BLOOD PRESSURE CUFF) misc, Blood Pressure Cuff That Talks. , Disp: 1 Each, Rfl: 0    potassium chloride SA (MICRO-K) 10 mEq capsule, TAKE ONE CAPSULE BY MOUTH TWICE A DAY, Disp: 180 Cap, Rfl: 0    nitroglycerin (NITROSTAT) 0.4 mg SL tablet, 1 Tab by SubLINGual route as needed for Chest Pain., Disp: 1 Bottle, Rfl: 3    aspirin 81 mg tablet, Take 81 mg by mouth.  , Disp: , Rfl:      Allergies   Allergen Reactions    Percocet [Oxycodone-Acetaminophen] Itching        Patient Active Problem List    Diagnosis Date Noted    Moderate episode of recurrent major depressive disorder ([address] 10/17/2017    Gastroesophageal reflux disease without esophagitis 02/03/2016    Allergic rhinitis 10/04/2012    Insomnia 06/05/2012    B12 deficiency 11/04/2011    Osteopenia 06/03/2011    Anxiety 05/16/2011    Hyperlipidemia 05/16/2011    CAD (coronary artery disease) 05/16/2011    Blindness 05/16/2011    HTN (hypertension) 05/16/2011        Review of Systems:  Constitutional: Negative for fatigue, malaise  Resp: Negative for cough, wheezing or SOB  CV: Negative for chest pain, dizziness or palpitations  GI: Negative for nausea or abdominal pain  MS: Negative for acute myalgias or arthralgias   Neuro: Negative for HA, weakness or paresthesia  Psych: Negative for depression or anxiety       Assessment/Plan:  Details of this discussion including any medical advice provided:       ICD-10-CM ICD-9-CM    1. Depression with anxiety  F41.8 300.4    2. Hyperlipidemia, unspecified hyperlipidemia type  E78.5 272.4 simvastatin (ZOCOR) 40 mg tablet   3. Coronary artery disease due to lipid rich plaque  I25.10 414.00 simvastatin (ZOCOR) 40 mg tablet    I25.83 414.3    4. Right knee pain, unspecified chronicity  M25.561 719.46 gabapentin (NEURONTIN) 300 mg capsule       Symptomatic therapy suggested: call prn if symptoms persist or worsen. Total Time: minutes: 11-20 minutes was spent on phone discussing above problems and plan. Patient medical history, prior OV notes, vitals flow sheet, lab results, medications, and allergies were reviewed during this encounter. For phone encounters:  I affirm this is a patient initiated episode with an established Patient who has not had a related appointment within my department in the past 7 days or scheduled within the next 24 hours.     Note: not billable if this call serves to triage the patient into an appointment for the relevant concern        MD LINA Sutton & AVELINA REDMAN [facility]  09/11/20         This is the Subsequent Medicare Annual Wellness Exam, performed 12 months or more after the Initial AWV or the last Subsequent MARLENE    I have reviewed the patient's medical history in detail and updated the computerized patient record. History     Patient Active Problem List   Diagnosis Code    Anxiety F41.9    Hyperlipidemia E78.5    CAD (coronary artery disease) I25.10    Blindness H54.7    HTN (hypertension) I10    Osteopenia M85.80    B12 deficiency E53.8    Insomnia G47.00    Allergic rhinitis J30.9    Gastroesophageal reflux disease without esophagitis K21.9    Moderate episode of recurrent major depressive disorder ([facility] [address] F33.1     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 90 Tab 1    LORazepam (ATIVAN) 1 mg tablet TAKE ONE TABLET BY MOUTH EVERY 8 HOURS AS NEEDED FOR ANXIETY. MAX DAILY AMOUNT IS 3MG 60 Tab 0    atenoloL (TENORMIN) 50 mg tablet Take 1 Tab by mouth daily. 90 Tab 0    furosemide (LASIX) 20 mg tablet TAKE ONE TABLET BY MOUTH EVERY DAY 90 Tab 0    clopidogreL (PLAVIX) 75 mg tab TAKE ONE TABLET BY MOUTH EVERY DAY 90 Tab 0    escitalopram oxalate (LEXAPRO) 10 mg tablet Take half tablet (0.5 mg) daily for 1 week. Increase to a full tablet (10 mg) after that until further notice. 30 Tab 1    esomeprazole (NEXIUM) 40 mg capsule TAKE ONE CAPSULE BY MOUTH EVERY DAY 90 Cap 0    albuterol (PROVENTIL HFA, VENTOLIN HFA, PROAIR HFA) 90 mcg/actuation inhaler Take 2 Puffs by inhalation every four (4) hours as needed for Wheezing. 1 Inhaler 1    melatonin 3 mg tablet Take 1 Tab by mouth nightly as needed for Insomnia or Sleep. 90 Tab 0    fexofenadine (ALLEGRA) 180 mg tablet Take 1 Tab by mouth daily as needed for Allergies. 30 Tab 1    fluticasone propionate (FLONASE) 50 mcg/actuation nasal spray USE 2 SPRAYS IN BOTH NOSTRILS DAILY 16 g 2    miscellaneous medical supply (BLOOD PRESSURE CUFF) misc Blood Pressure Cuff That Talks. 1 Each 0    potassium chloride SA (MICRO-K) 10 mEq capsule TAKE ONE CAPSULE BY MOUTH TWICE A  Cap 0    nitroglycerin (NITROSTAT) 0.4 mg SL tablet 1 Tab by SubLINGual route as needed for Chest Pain. 1 Bottle 3    aspirin 81 mg tablet Take 81 mg by mouth. Allergies   Allergen Reactions    Percocet [Oxycodone-Acetaminophen] Itching       Family History   Problem Relation Age of Onset    No Known Problems Mother     No Known Problems Father      Social History     Tobacco Use    Smoking status: Never Smoker    Smokeless tobacco: Never Used   Substance Use Topics    Alcohol use: Yes     Comment: social       Depression Risk Factor Screening:     3 most recent PHQ Screens 10/17/2017   PHQ Not Done Active Diagnosis of Depression or Bipolar Disorder   Little interest or pleasure in doing things Several days   Feeling down, depressed, irritable, or hopeless Not at all   Total Score PHQ 2 1       Alcohol Risk Screen   Do you average more than 1 drink per night or more than 7 drinks a week:  No    On any one occasion in the past three months have you have had more than 3 drinks containing alcohol:  No        Functional Ability and Level of Safety:   Hearing: Hearing is good. Activities of Daily Living: The home contains: no safety equipment. Patient needs help with:  transportation, shopping and housework     Ambulation: with no difficulty     Fall Risk:  Fall Risk Assessment, last 12 mths 3/12/2020   Able to walk? Yes   Fall in past 12 months?  No     Abuse Screen:  Patient is not abused Cognitive Screening   Has your family/caregiver stated any concerns about your memory: no        Patient Care Team   Patient Care Team:  Shilo Cota MD as PCP - General (Family Medicine)  Shilo Cota MD as PCP - [facility]  German Logan MD (Cardiology)  Mercedes Soriano MD (Orthopedic Surgery)    Assessment/Plan   Education and counseling provided:  Are appropriate based on today's review and evaluation  End-of-Life planning (with patient's consent)    Diagnoses and all orders for this visit:    1. Medicare annual wellness visit, subsequent    2. Screening for alcoholism        Health Maintenance Due   Topic Date Due    FOBT Q1Y Age,18+  04/22/1968    Breast Cancer Screen Mammogram  05/20/2017    Medicare Yearly Exam  01/26/2020    Flu Vaccine (1) 09/01/2020    GLAUCOMA SCREENING Q2Y  10/01/2020       Chyna Jaramillo, who was evaluated through a synchronous (real-time) audio only encounter, and/or her healthcare decision maker, is aware that it is a billable service, with coverage as determined by her insurance carrier. She provided verbal consent to proceed: Yes, and patient identification was verified. It was conducted pursuant to the emergency declaration under the [facility], [facility] waiver authority and the [employer] Act. A caregiver was present when appropriate. Ability to conduct physical exam was limited. I was in the office. The patient was in the office.     Gomez Baig MD

## 2020-09-18 ENCOUNTER — TELEPHONE (OUTPATIENT)
Dept: FAMILY MEDICINE CLINIC | Age: 70
End: 2020-09-18

## 2020-09-18 NOTE — TELEPHONE ENCOUNTER
Returned call to patient. Advised her that I do not see any record of her blood type. She verbalized understanding.

## 2020-10-08 ENCOUNTER — TELEPHONE (OUTPATIENT)
Dept: FAMILY MEDICINE CLINIC | Age: 70
End: 2020-10-08

## 2020-10-08 ENCOUNTER — TRANSCRIBE ORDER (OUTPATIENT)
Dept: FAMILY MEDICINE CLINIC | Age: 70
End: 2020-10-08

## 2020-10-08 NOTE — TELEPHONE ENCOUNTER
----- Message from Tesha Lacy sent at 10/8/2020  2:18 PM EDT -----  Regarding: Tory Ellis MD  General Message/Vendor Calls    Caller's first and last name: Phu Erickson [[id number]]      Reason for call: Rx Consultation/Request      Callback required yes/no and why: Yes      Best contact number(s): [phone]      Details to clarify the request: pt is requesting medication for ear infection to be sent to [address], [facility].  MAIN ST before 3pm-if able to sent before eob 10/08/2020 pt would like callback to confirm    Tesha Lacy

## 2020-10-09 ENCOUNTER — VIRTUAL VISIT (OUTPATIENT)
Dept: FAMILY MEDICINE CLINIC | Age: 70
End: 2020-10-09
Payer: MEDICARE

## 2020-10-09 DIAGNOSIS — J01.90 ACUTE BACTERIAL RHINOSINUSITIS: Primary | ICD-10-CM

## 2020-10-09 DIAGNOSIS — B96.89 ACUTE BACTERIAL RHINOSINUSITIS: Primary | ICD-10-CM

## 2020-10-09 PROCEDURE — 99442 PR PHYS/QHP TELEPHONE EVALUATION 11-20 MIN: CPT | Performed by: STUDENT IN AN ORGANIZED HEALTH CARE EDUCATION/TRAINING PROGRAM

## 2020-10-09 RX ORDER — ALBUTEROL SULFATE 90 UG/1
2 AEROSOL, METERED RESPIRATORY (INHALATION)
Qty: 1 INHALER | Refills: 1 | Status: SHIPPED | OUTPATIENT
Start: 2020-10-09 | End: 2021-01-04 | Stop reason: SDUPTHER

## 2020-10-09 RX ORDER — AMOXICILLIN AND CLAVULANATE POTASSIUM 875; 125 MG/1; MG/1
1 TABLET, FILM COATED ORAL 2 TIMES DAILY
Qty: 14 TAB | Refills: 0 | Status: SHIPPED | OUTPATIENT
Start: 2020-10-09 | End: 2021-02-22 | Stop reason: SDUPTHER

## 2020-10-09 NOTE — PROGRESS NOTES
[address] Medicine Residency Attending Addendum:  Dr. Kathy Law MD,  the patient and I were not physically present during this encounter. The resident and I are concurrently monitoring the patient care through appropriate telecommunication technology. I discussed the findings, assessment and plan with the resident and agree with the resident's findings and plan as documented in the resident's note.       Katherine De La Garza MD

## 2020-10-09 NOTE — PROGRESS NOTES
Eliud Kruse  78 y.o. female  1950  [facility]  [place]   [id number]    [phone] (home)      [address]:    Telephone Encounter  My Liu MD       Encounter Date: 10/9/2020 at 8:15 AM    Consent: Eliud Kruse, who was seen by synchronous (real-time) audio only technology, and/or her healthcare decision maker, is aware that this patient-initiated, Telehealth encounter on 10/9/2020 is a billable service, with coverage as determined by her insurance carrier. She is aware that she may receive a bill and has provided verbal consent to proceed: Yes. No chief complaint on file. History of Present Illness   Chyna Bell is a 78 y.o. female was evaluated by telephone. I communicated with the patient and/or health care decision maker about sinus infection. Sinus infection- Started about 6 days ago. Nasal congestion with purulent drainage. Sore throat. R ear pain/pressure. Gets sinus infections every 6 months or so. Denies subjective fevers, chills, nausea, abdominal pain, SoB and sick contacts. Has been socially isolating. Tried- Zyrtec, allegra. \"Sudafed makes me sick\". Review of Systems   Review of Systems   Constitutional: Negative for chills and fever. HENT: Positive for congestion, ear pain, sinus pain and sore throat. Respiratory: Negative for cough and shortness of breath. Cardiovascular: Negative for chest pain and palpitations. Gastrointestinal: Negative for nausea and vomiting. Neurological: Negative for dizziness and headaches. Vitals/Objective:   General: Patient speaking in complete sentences without effort. Normal speech and cooperative. Due to this being a Virtual Check-in/Telephone evaluation, many elements of the physical examination are unable to be assessed.     Assessment and Plan:   Time-based coding, delete if not needed: I spent at least 15 minutes with this established patient, and >50% of the time was spent counseling and/or coordinating care regarding sinus infection  Total Time: minutes: 11-20 minutes    1. Acute bacterial rhinosinusitis- Symptoms present ~6 days and getting worse each day. As today is Friday, will send a Rx in with recommendation to wait another day or two to pick it up. If not better then begin taking it for full 7 days. If improvement over the weekend do not take. Also discussed symptomatic treatment and COVID isolation precautions. - amoxicillin-clavulanate (AUGMENTIN) 875-125 mg per tablet; Take 1 Tab by mouth two (2) times a day for 7 days. Dispense: 14 Tab; Refill: 0  - albuterol (PROVENTIL HFA, VENTOLIN HFA, PROAIR HFA) 90 mcg/actuation inhaler; Take 2 Puffs by inhalation every four (4) hours as needed for Wheezing. Dispense: 1 Inhaler; Refill: 1        We discussed the expected course, resolution and complications of the diagnosis(es) in detail. Medication risks, benefits, costs, interactions, and alternatives were discussed as indicated. I advised her to contact the office if her condition worsens, changes or fails to improve as anticipated. She expressed understanding with the diagnosis(es) and plan. Patient understands that this encounter was a temporary measure, and the importance of further follow up and examination was emphasized. Patient verbalized understanding. Patient informed to follow up: 3-5 days prn    I affirm this is a Patient Initiated Episode with an Established Patient who has not had a related appointment within my department in the past 7 days or scheduled within the next 24 hours.   Note: not billable if this call serves to triage the patient into an appointment for the relevant concern      Electronically Signed: Brigida Brown MD  Providers location when delivering service: office    CPT:  55159 (5-10 minutes)  [phone] (11-20 minutes)  21  (21-30 minutes)    Medicare:  [address]      ICD-10-CM ICD-9-CM 1. Acute bacterial rhinosinusitis  J01.90 461.9 amoxicillin-clavulanate (AUGMENTIN) 875-125 mg per tablet    B96.89  albuterol (PROVENTIL HFA, VENTOLIN HFA, PROAIR HFA) 90 mcg/actuation inhaler       Pursuant to the emergency declaration under the [address] authority and the [employer] Act, this Virtual  Visit was conducted, with patient's consent, to reduce the patient's risk of exposure to COVID-19 and provide continuity of care for an established patient. History   Patients past medical, surgical and family histories were personally reviewed and updated.       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- [facility]- has GRIFFIN     Past Surgical History:   Procedure Laterality Date    CABG, VEIN, FIVE  2006    CARDIAC SURG PROCEDURE UNLIST      HX ORTHOPAEDIC       Family History   Problem Relation Age of Onset    No Known Problems Mother     No Known Problems Father      Social History     Socioeconomic History    Marital status:      Spouse name: Not on file    Number of children: Not on file    Years of education: Not on file    Highest education level: Not on file   Occupational History    Not on file   Social Needs    Financial resource strain: Not on file    Food insecurity     Worry: Not on file     Inability: Not on file    Transportation needs     Medical: Not on file     Non-medical: Not on file   Tobacco Use    Smoking status: Never Smoker    Smokeless tobacco: Never Used   Substance and Sexual Activity    Alcohol use: Yes     Comment: social    Drug use: No    Sexual activity: Yes     Partners: Male     Birth control/protection: None   Lifestyle    Physical activity     Days per week: Not on file     Minutes per session: Not on file    Stress: Not on file   Relationships    Social connections     Talks on phone: Not on file     Gets together: Not on file     Attends [employer] service: Not on file     Active member of club or organization: Not on file     Attends meetings of clubs or organizations: Not on file     Relationship status: Not on file    Intimate partner violence     Fear of current or ex partner: Not on file     Emotionally abused: Not on file     Physically abused: Not on file     Forced sexual activity: Not on file   Other Topics Concern    Not on file   Social History Narrative    Not on file            Current Medications/Allergies   Medications and Allergies reviewed:    Current Outpatient Medications   Medication Sig Dispense Refill    amoxicillin-clavulanate (AUGMENTIN) 875-125 mg per tablet Take 1 Tab by mouth two (2) times a day for 7 days. 14 Tab 0    albuterol (PROVENTIL HFA, VENTOLIN HFA, PROAIR HFA) 90 mcg/actuation inhaler Take 2 Puffs by inhalation every four (4) hours as needed for Wheezing. 1 Inhaler 1    LORazepam (ATIVAN) 1 mg tablet TAKE ONE TABLET BY MOUTH EVERY 8 HOURS AS NEEDED FOR ANXIETY. MAX DAILY AMOUNT IS 3MG 60 Tab 0    esomeprazole (NEXIUM) 40 mg capsule TAKE ONE CAPSULE BY MOUTH EVERY DAY 90 Cap 0    gabapentin (NEURONTIN) 300 mg capsule TAKE ONE CAPSULE BY MOUTH 3 TIMES A DAY AS NEEDED FOR PAIN  Indications: \"change of life\" signs 90 Cap 2    simvastatin (ZOCOR) 40 mg tablet Take 1 Tab by mouth nightly. 90 Tab 1    atenoloL (TENORMIN) 50 mg tablet Take 1 Tab by mouth daily. 90 Tab 0    furosemide (LASIX) 20 mg tablet TAKE ONE TABLET BY MOUTH EVERY DAY 90 Tab 0    clopidogreL (PLAVIX) 75 mg tab TAKE ONE TABLET BY MOUTH EVERY DAY 90 Tab 0    escitalopram oxalate (LEXAPRO) 10 mg tablet Take half tablet (0.5 mg) daily for 1 week. Increase to a full tablet (10 mg) after that until further notice.  30 Tab 1    melatonin 3 mg tablet Take 1 Tab by mouth nightly as needed for Insomnia or Sleep. 90 Tab 0    fexofenadine (ALLEGRA) 180 mg tablet Take 1 Tab by mouth daily as needed for Allergies. 30 Tab 1    fluticasone propionate (FLONASE) 50 mcg/actuation nasal spray USE 2 SPRAYS IN BOTH NOSTRILS DAILY 16 g 2    miscellaneous medical supply (BLOOD PRESSURE CUFF) misc Blood Pressure Cuff That Talks. 1 Each 0    potassium chloride SA (MICRO-K) 10 mEq capsule TAKE ONE CAPSULE BY MOUTH TWICE A  Cap 0    nitroglycerin (NITROSTAT) 0.4 mg SL tablet 1 Tab by SubLINGual route as needed for Chest Pain. 1 Bottle 3    aspirin 81 mg tablet Take 81 mg by mouth.          Allergies   Allergen Reactions    Percocet [Oxycodone-Acetaminophen] Itching

## 2020-10-16 ENCOUNTER — TELEPHONE (OUTPATIENT)
Dept: FAMILY MEDICINE CLINIC | Age: 70
End: 2020-10-16

## 2020-10-16 NOTE — TELEPHONE ENCOUNTER
Pt is calling back, the Congestion has moved down into her chest. She is still taking the Augmentin. She is suppose to continue for a week. Wanted to know if she can come in for an x-ray. She is coughing up mucus. She has a wheezing also. Concern about it because of her open heart surgery.

## 2020-10-19 ENCOUNTER — TRANSCRIBE ORDER (OUTPATIENT)
Dept: FAMILY MEDICINE CLINIC | Age: 70
End: 2020-10-19

## 2020-10-19 ENCOUNTER — TELEPHONE (OUTPATIENT)
Dept: FAMILY MEDICINE CLINIC | Age: 70
End: 2020-10-19

## 2020-10-19 NOTE — TELEPHONE ENCOUNTER
----- Message from Anne Osborn sent at 10/19/2020 11:32 AM EDT -----  Regarding: Ghassan Burns MD / telephone  General Message/Vendor Calls    Caller's first and last name: Rogers Mooney      Reason for call: pt is requesting a nebulizer.  Pt states that sometimes her nose gets stuffy because of sprays or when someone is cooking      Callback required yes/no and why:      Best contact number(s): [phone]      Details to clarify the request:      Anne Osborn

## 2020-10-19 NOTE — TELEPHONE ENCOUNTER
Patient states ever since she has open heart surgery she has some trouble breathing at times around greasy cooking or air sprays. She is requesting nebulizer. I advised her that the nebulizer would not help the stuffy nose. She states she thought \"it would her in her chest\" please advise.

## 2020-10-19 NOTE — TELEPHONE ENCOUNTER
----- Message from Zayra Parker sent at 10/19/2020  2:34 PM EDT -----  Regarding: Delia Aleman MD  Patient return call    Caller's first and last name and relationship (if not the patient):      Best contact number(s): [phone]      Whose call is being returned: Newton Reyes LPN       Details to clarify the request:       Zayra Parker

## 2020-10-20 RX ORDER — FLUTICASONE PROPIONATE 50 MCG
SPRAY, SUSPENSION (ML) NASAL
Qty: 16 G | Refills: 2 | Status: SHIPPED | OUTPATIENT
Start: 2020-10-20 | End: 2021-12-03

## 2020-10-20 NOTE — TELEPHONE ENCOUNTER
She has a prescription for an albuterol inhaler. Will refill Flonase for her rhinitis. Nebul;izer not indicated without dx of asthma or COPD and poses a risk of adverse side effects.

## 2020-10-21 ENCOUNTER — CLINICAL SUPPORT (OUTPATIENT)
Dept: FAMILY MEDICINE CLINIC | Age: 70
End: 2020-10-21
Payer: MEDICARE

## 2020-10-21 VITALS — TEMPERATURE: 98.1 F

## 2020-10-21 DIAGNOSIS — Z23 ENCOUNTER FOR IMMUNIZATION: Primary | ICD-10-CM

## 2020-10-21 PROCEDURE — 90694 VACC AIIV4 NO PRSRV 0.5ML IM: CPT | Performed by: FAMILY MEDICINE

## 2020-10-21 PROCEDURE — G0008 ADMIN INFLUENZA VIRUS VAC: HCPCS | Performed by: FAMILY MEDICINE

## 2020-10-21 NOTE — PROGRESS NOTES
Pt here for flu shot only and denies any recent illness. VIS available, no questions. Pt tolerated well.

## 2020-10-24 ENCOUNTER — NURSE TRIAGE (OUTPATIENT)
Dept: OTHER | Facility: CLINIC | Age: 70
End: 2020-10-24

## 2020-10-24 NOTE — TELEPHONE ENCOUNTER
CALL RECEIVED FROM: Patient  PATIENT STATES: Has had some symptoms of covid and had cardiac surgery years ago and is concerned about getting pneumonia. Weakness, fatigue, cough, shortness of breath, rash, abdominal pain runny nose. Gets nauseated when she thinks she may need to go out. States when she uses an inhaler it makes her feel better. PROTOCOL RECOMMENDATION: To go to ED, she doesn't really want to go to ED. States she will wait and see how she is. Informed her even if she doesn't go to ED, call the pcp on Monday. Informed her to call back anytime for any other problems or questions. PLEASE DO NOT RESPOND TO THIS TRIAGE NOTE THROUGH THIS ENCOUNTER. ANY SUBSEQUENT COMMUNICATION SHOULD BE DIRECTLY WITH THE PATIENT. Reason for Disposition   Patient sounds very sick or weak to the triager    Answer Assessment - Initial Assessment Questions  1. COVID-19 DIAGNOSIS: \"Who made your Coronavirus (COVID-19) diagnosis? \" \"Was it confirmed by a positive lab test?\" If not diagnosed by a HCP, ask \"Are there lots of cases (community spread) where you live? \" (See public health department website, if unsure)      no  2. ONSET: \"When did the COVID-19 symptoms start? \"       Two weeks ago  3. WORST SYMPTOM: \"What is your worst symptom? \" (e.g., cough, fever, shortness of breath, muscle aches)      Cough, sob, wheezing, weak and worse  4. COUGH: \"Do you have a cough? \" If so, ask: \"How bad is the cough? \"        Cough was worse but mostly at nighttime now is when it starts. 5. FEVER: \"Do you have a fever? \" If so, ask: \"What is your temperature, how was it measured, and when did it start? \"       97 this morning, has not had fever        6. RESPIRATORY STATUS: \"Describe your breathing? \" (e.g., shortness of breath, wheezing, unable to speak)       Wheezing and writer can hear  7. BETTER-SAME-WORSE: [employer] you getting better, staying the same or getting worse compared to yesterday? \"  If getting worse, ask, \"In what way? \" Worse  8. HIGH RISK DISEASE: \"Do you have any chronic medical problems? 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 Has been taking BP meds for many years. 10. OTHER SYMPTOMS: \"Do you have any other symptoms? \"  (e.g., chills, fatigue, headache, loss of smell or taste, muscle pain, sore throat)        Nausea now thinks it was due to eating two bananas.     Protocols used: CORONAVIRUS (COVID-19) DIAGNOSED OR SUSPECTED-ADULT-

## 2020-10-26 ENCOUNTER — VIRTUAL VISIT (OUTPATIENT)
Dept: FAMILY MEDICINE CLINIC | Age: 70
End: 2020-10-26
Payer: MEDICARE

## 2020-10-26 ENCOUNTER — TRANSCRIBE ORDER (OUTPATIENT)
Dept: FAMILY MEDICINE CLINIC | Age: 70
End: 2020-10-26

## 2020-10-26 DIAGNOSIS — J30.2 SEASONAL ALLERGIC RHINITIS, UNSPECIFIED TRIGGER: Primary | ICD-10-CM

## 2020-10-26 DIAGNOSIS — R06.2 WHEEZING: ICD-10-CM

## 2020-10-26 DIAGNOSIS — R05.9 COUGH: ICD-10-CM

## 2020-10-26 PROCEDURE — 99442 PR PHYS/QHP TELEPHONE EVALUATION 11-20 MIN: CPT | Performed by: FAMILY MEDICINE

## 2020-10-26 RX ORDER — ALBUTEROL SULFATE 90 UG/1
2 AEROSOL, METERED RESPIRATORY (INHALATION)
Qty: 1 INHALER | Refills: 1 | Status: CANCELLED | OUTPATIENT
Start: 2020-10-26

## 2020-10-26 RX ORDER — BENZONATATE 100 MG/1
100 CAPSULE ORAL
Qty: 30 CAP | Refills: 1 | Status: SHIPPED | OUTPATIENT
Start: 2020-10-26 | End: 2021-02-22

## 2020-10-26 RX ORDER — CETIRIZINE HCL 10 MG
TABLET ORAL
COMMUNITY
End: 2021-06-08

## 2020-10-26 RX ORDER — MONTELUKAST SODIUM 10 MG/1
10 TABLET ORAL DAILY
Qty: 30 TAB | Refills: 2 | Status: SHIPPED | OUTPATIENT
Start: 2020-10-26 | End: 2021-09-16

## 2020-10-26 NOTE — PROGRESS NOTES
Amy Godinez is a 78 y.o. female evaluated via telephone on 10/26/20. Patient Identity confirmed by . Telephone encounter done in lieu of office visit due to extraordinary circumstances. A state of national and state emergency has been declared by the President and the [place] due to the [employer] pandemic. Pursuant to the emergency declaration under the [facility] [address] authority and the [employer] and [employer] Act, this Virtual  Visit was conducted, with patient's consent, to reduce the patient's risk of exposure to COVID-19 and provide continuity of care for an established patient. Cady Ireland LPN coordinated virtual visit    Consent:  Patient and/or health care decision maker is aware that he/she may receive a bill for this telephone encounter, depending on his insurance coverage, and has provided verbal consent to proceed: Yes    Physician Location: Office  Patient Location: Home    CC: congestion  Information gathered from patient and/or health care decision maker. HPI: Amy Godinez is a 78 y.o. female who was evaluated by synchronous (real-time) audio technology from his/her home. Patient with hx of allergic rhinits. She was put on course of antibiotic 2 weeks ago. Says symptoms improved. However these worsened over the weekend. She says she feels better today. Patient blind sans transportation to get tested for Covid-19. She was advised to be tested. She complains of ear fullness, post nasal drip and cough. Patient denies F/C, HA, dizziness, SOB, CP, abdominal pain, dysuria, acute myalgias or arthralgias. Encounter Diagnoses   Name Primary?     Seasonal allergic rhinitis, unspecified trigger Yes    Wheezing     Cough          Current Outpatient Medications:     cetirizine (ZyrTEC) 10 mg tablet, Take  by mouth., Disp: , Rfl:     montelukast (SINGULAIR) 10 mg tablet, Take 1 Tab by mouth daily., Disp: 30 Tab, Rfl: 2    benzonatate (TESSALON) 100 mg capsule, Take 1 Cap by mouth three (3) times daily as needed for Cough. , Disp: 30 Cap, Rfl: 1    clopidogreL (PLAVIX) 75 mg tab, TAKE ONE TABLET BY MOUTH EVERY DAY, Disp: 90 Tab, Rfl: 0    buPROPion XL (WELLBUTRIN XL) 150 mg tablet, TAKE 1 TABLET BY MOUTH EVERY MORNING, Disp: 90 Tab, Rfl: 0    fluticasone propionate (FLONASE) 50 mcg/actuation nasal spray, USE 2 SPRAYS IN BOTH NOSTRILS DAILY, Disp: 16 g, Rfl: 2    albuterol (PROVENTIL HFA, VENTOLIN HFA, PROAIR HFA) 90 mcg/actuation inhaler, Take 2 Puffs by inhalation every four (4) hours as needed for Wheezing., Disp: 1 Inhaler, Rfl: 1    LORazepam (ATIVAN) 1 mg tablet, TAKE ONE TABLET BY MOUTH EVERY 8 HOURS AS NEEDED FOR ANXIETY. MAX DAILY AMOUNT IS 3MG, Disp: 60 Tab, Rfl: 0    esomeprazole (NEXIUM) 40 mg capsule, TAKE ONE CAPSULE BY MOUTH EVERY DAY, Disp: 90 Cap, Rfl: 0    gabapentin (NEURONTIN) 300 mg capsule, TAKE ONE CAPSULE BY MOUTH 3 TIMES A DAY AS NEEDED FOR PAIN  Indications: \"change of life\" signs, Disp: 90 Cap, Rfl: 2    simvastatin (ZOCOR) 40 mg tablet, Take 1 Tab by mouth nightly., Disp: 90 Tab, Rfl: 1    atenoloL (TENORMIN) 50 mg tablet, Take 1 Tab by mouth daily. , Disp: 90 Tab, Rfl: 0    furosemide (LASIX) 20 mg tablet, TAKE ONE TABLET BY MOUTH EVERY DAY, Disp: 90 Tab, Rfl: 0    miscellaneous medical supply (BLOOD PRESSURE CUFF) misc, Blood Pressure Cuff That Talks. , Disp: 1 Each, Rfl: 0    potassium chloride SA (MICRO-K) 10 mEq capsule, TAKE ONE CAPSULE BY MOUTH TWICE A DAY, Disp: 180 Cap, Rfl: 0    aspirin 81 mg tablet, Take 81 mg by mouth.  , Disp: , Rfl:     nitroglycerin (NITROSTAT) 0.4 mg SL tablet, 1 Tab by SubLINGual route as needed for Chest Pain., Disp: 1 Bottle, Rfl: 3     Allergies   Allergen Reactions    Percocet [Oxycodone-Acetaminophen] Itching        Patient Active Problem List    Diagnosis Date Noted    Moderate episode of recurrent major depressive disorder ([facility] 10/17/2017    Gastroesophageal reflux disease without esophagitis 02/03/2016    Allergic rhinitis 10/04/2012    Insomnia 06/05/2012    B12 deficiency 11/04/2011    Osteopenia 06/03/2011    Anxiety 05/16/2011    Hyperlipidemia 05/16/2011    CAD (coronary artery disease) 05/16/2011    Blindness 05/16/2011    HTN (hypertension) 05/16/2011        Review of Systems:  Constitutional: Negative for fatigue, malaise  Resp: see HPI, Negative for wheezing or SOB  CV: Negative for chest pain, dizziness or palpitations  GI: Negative for nausea or abdominal pain  MS: Negative for acute myalgias or arthralgias   Neuro: Negative for HA, weakness or paresthesia  Psych: hx of anxiety       Assessment/Plan:  Details of this discussion including any medical advice provided: Patient advised to be tested for Covid-19. As a precaution, stay at home, practice regular hand washing with soap and warm water, wear a mask and utilize social distancing. ICD-10-CM ICD-9-CM    1. Seasonal allergic rhinitis, unspecified trigger  J30.2 477.9 montelukast (SINGULAIR) 10 mg tablet   2. Wheezing  R06.2 786.07 montelukast (SINGULAIR) 10 mg tablet   3. Cough  R05 786.2 benzonatate (TESSALON) 100 mg capsule       Symptomatic therapy suggested: rest and call prn if symptoms persist or worsen. Total Time: minutes: 11-20 minutes was spent on phone discussing above problems and plan. Patient medical history, prior OV notes, vitals flow sheet, lab results, medications, and allergies were reviewed during this encounter. For phone encounters:  I affirm this is a patient initiated episode with an established Patient who has not had a related appointment within my department in the past 7 days or scheduled within the next 24 hours.     Note: not billable if this call serves to triage the patient into an appointment for the relevant concern        MD LINA Hudson & AVELINA REDMAN [facility]  10/26/20

## 2020-10-26 NOTE — PROGRESS NOTES
Chief Complaint   Patient presents with    Ear Pain    Cough       1. Have you been to the ER, urgent care clinic since your last visit? Hospitalized since your last visit? No    2. Have you seen or consulted any other health care providers outside of the [address] [place] [address] since your last visit? Include any pap smears or colon screening.  No    Reviewed record in preparation for visit and have necessary documentation  Pt did not bring medication to office visit for review  Information was given to pt on Advanced Directives, Living Will  Information was given on Shingles Vaccine  Opportunity was given for questions  Goals that were addressed and/or need to be completed after this appointment include:     Health Maintenance Due   Topic Date Due    Breast Cancer Screen Mammogram  05/20/2017    GLAUCOMA SCREENING Q2Y  10/01/2020

## 2020-11-04 DIAGNOSIS — F41.8 DEPRESSION WITH ANXIETY: ICD-10-CM

## 2020-11-05 ENCOUNTER — TELEPHONE (OUTPATIENT)
Dept: FAMILY MEDICINE CLINIC | Age: 70
End: 2020-11-05

## 2020-11-06 RX ORDER — LORAZEPAM 1 MG/1
TABLET ORAL
Qty: 60 TAB | Refills: 0 | Status: SHIPPED | OUTPATIENT
Start: 2020-11-06 | End: 2020-12-01 | Stop reason: SDUPTHER

## 2020-12-01 DIAGNOSIS — I25.83 CORONARY ARTERY DISEASE DUE TO LIPID RICH PLAQUE: ICD-10-CM

## 2020-12-01 DIAGNOSIS — I10 ESSENTIAL HYPERTENSION: ICD-10-CM

## 2020-12-01 DIAGNOSIS — F41.8 DEPRESSION WITH ANXIETY: ICD-10-CM

## 2020-12-01 DIAGNOSIS — I25.10 CORONARY ARTERY DISEASE DUE TO LIPID RICH PLAQUE: ICD-10-CM

## 2020-12-02 RX ORDER — ATENOLOL 50 MG/1
50 TABLET ORAL DAILY
Qty: 90 TAB | Refills: 0 | Status: SHIPPED | OUTPATIENT
Start: 2020-12-02 | End: 2021-03-04 | Stop reason: SDUPTHER

## 2020-12-02 RX ORDER — LORAZEPAM 1 MG/1
TABLET ORAL
Qty: 60 TAB | Refills: 0 | Status: SHIPPED | OUTPATIENT
Start: 2020-12-02 | End: 2021-01-04 | Stop reason: SDUPTHER

## 2020-12-11 ENCOUNTER — VIRTUAL VISIT (OUTPATIENT)
Dept: FAMILY MEDICINE CLINIC | Age: 70
End: 2020-12-11
Payer: MEDICARE

## 2020-12-11 DIAGNOSIS — I25.10 CORONARY ARTERY DISEASE DUE TO LIPID RICH PLAQUE: ICD-10-CM

## 2020-12-11 DIAGNOSIS — Z76.0 ENCOUNTER FOR MEDICATION REFILL: ICD-10-CM

## 2020-12-11 DIAGNOSIS — I25.83 CORONARY ARTERY DISEASE DUE TO LIPID RICH PLAQUE: ICD-10-CM

## 2020-12-11 DIAGNOSIS — E78.5 HYPERLIPIDEMIA, UNSPECIFIED HYPERLIPIDEMIA TYPE: ICD-10-CM

## 2020-12-11 DIAGNOSIS — M25.561 RIGHT KNEE PAIN, UNSPECIFIED CHRONICITY: ICD-10-CM

## 2020-12-11 PROCEDURE — 99442 PR PHYS/QHP TELEPHONE EVALUATION 11-20 MIN: CPT | Performed by: FAMILY MEDICINE

## 2020-12-11 RX ORDER — SIMVASTATIN 40 MG/1
40 TABLET, FILM COATED ORAL
Qty: 90 TAB | Refills: 1 | Status: SHIPPED | OUTPATIENT
Start: 2020-12-11 | End: 2021-03-22 | Stop reason: SDUPTHER

## 2020-12-11 RX ORDER — POTASSIUM CHLORIDE 750 MG/1
10 CAPSULE, EXTENDED RELEASE ORAL DAILY
Qty: 90 CAP | Refills: 0 | Status: SHIPPED | OUTPATIENT
Start: 2020-12-11 | End: 2021-02-22

## 2020-12-11 RX ORDER — GABAPENTIN 300 MG/1
CAPSULE ORAL
Qty: 90 CAP | Refills: 0 | Status: SHIPPED | OUTPATIENT
Start: 2020-12-11 | End: 2021-02-22

## 2020-12-11 NOTE — PROGRESS NOTES
Real Shah is a 78 y.o. female evaluated via telephone on 20. Patient Identity confirmed by . Telephone encounter done in lieu of office visit due to extraordinary circumstances. A state of national and state emergency has been declared by the President and the [place] due to the [employer] pandemic. Pursuant to the emergency declaration under the [facility] waiver authority and the [employer] and [employer] Act, this Virtual  Visit was conducted, with patient's consent, to reduce the patient's risk of exposure to COVID-19 and provide continuity of care for an established patient. Yolette Dias LPN coordinated virtual visit    Consent:  Patient and/or health care decision maker is aware that he/she may receive a bill for this telephone encounter, depending on his insurance coverage, and has provided verbal consent to proceed: Yes    Physician Location: Office  Patient Location: Home    CC: medication   Information gathered from patient and/or health care decision maker. HPI: Real Shah is a 78 y.o. female who was evaluated by synchronous (real-time) audio technology from his/her home. Patient scheduled for 3 month follow up of her chronic medical conditions. Patient with hx of CAD, HTN, HLD, GERD, B12 deficiency, blindness, anxiety and depression. Patient denies HA, dizziness, SOB, CP, abdominal pain, dysuria. I once again explained to patient that she needs an encounter every 3 months for refills of lorazepam.     Encounter Diagnoses   Name Primary?     Coronary artery disease due to lipid rich plaque     Hyperlipidemia, unspecified hyperlipidemia type     Right knee pain, unspecified chronicity     Encounter for medication refill          Current Outpatient Medications:     simvastatin (ZOCOR) 40 mg tablet, Take 1 Tab by mouth nightly., Disp: 90 Tab, Rfl: 1    gabapentin (NEURONTIN) 300 mg capsule, TAKE ONE CAPSULE BY MOUTH 3 TIMES A DAY AS NEEDED FOR PAIN  Indications: \"change of life\" signs, Disp: 90 Cap, Rfl: 0    potassium chloride SA (MICRO-K) 10 mEq capsule, Take 1 Cap by mouth daily. , Disp: 90 Cap, Rfl: 0    LORazepam (ATIVAN) 1 mg tablet, TAKE ONE TABLET BY MOUTH EVERY 8 HOURS AS NEEDED FOR ANXIETY. MAX DAILY AMOUNT IS 3MG, Disp: 60 Tab, Rfl: 0    atenoloL (TENORMIN) 50 mg tablet, Take 1 Tab by mouth daily. , Disp: 90 Tab, Rfl: 0    cetirizine (ZyrTEC) 10 mg tablet, Take  by mouth., Disp: , Rfl:     montelukast (SINGULAIR) 10 mg tablet, Take 1 Tab by mouth daily. , Disp: 30 Tab, Rfl: 2    benzonatate (TESSALON) 100 mg capsule, Take 1 Cap by mouth three (3) times daily as needed for Cough. , Disp: 30 Cap, Rfl: 1    clopidogreL (PLAVIX) 75 mg tab, TAKE ONE TABLET BY MOUTH EVERY DAY, Disp: 90 Tab, Rfl: 0    buPROPion XL (WELLBUTRIN XL) 150 mg tablet, TAKE 1 TABLET BY MOUTH EVERY MORNING, Disp: 90 Tab, Rfl: 0    fluticasone propionate (FLONASE) 50 mcg/actuation nasal spray, USE 2 SPRAYS IN BOTH NOSTRILS DAILY, Disp: 16 g, Rfl: 2    albuterol (PROVENTIL HFA, VENTOLIN HFA, PROAIR HFA) 90 mcg/actuation inhaler, Take 2 Puffs by inhalation every four (4) hours as needed for Wheezing., Disp: 1 Inhaler, Rfl: 1    esomeprazole (NEXIUM) 40 mg capsule, TAKE ONE CAPSULE BY MOUTH EVERY DAY, Disp: 90 Cap, Rfl: 0    furosemide (LASIX) 20 mg tablet, TAKE ONE TABLET BY MOUTH EVERY DAY, Disp: 90 Tab, Rfl: 0    miscellaneous medical supply (BLOOD PRESSURE CUFF) misc, Blood Pressure Cuff That Talks. , Disp: 1 Each, Rfl: 0    nitroglycerin (NITROSTAT) 0.4 mg SL tablet, 1 Tab by SubLINGual route as needed for Chest Pain., Disp: 1 Bottle, Rfl: 3    aspirin 81 mg tablet, Take 81 mg by mouth.  , Disp: , Rfl:      Allergies   Allergen Reactions    Percocet [Oxycodone-Acetaminophen] Itching        Patient Active Problem List    Diagnosis Date Noted    [facility]    Gastroesophageal reflux disease without esophagitis 02/03/2016    Allergic rhinitis 10/04/2012    Insomnia 06/05/2012    B12 deficiency 11/04/2011    Osteopenia 06/03/2011    Anxiety 05/16/2011    Hyperlipidemia 05/16/2011    CAD (coronary artery disease) 05/16/2011    Blindness 05/16/2011    HTN (hypertension) 05/16/2011        Review of Systems:  Constitutional: Negative for fatigue, malaise  Resp: Negative for cough, wheezing or SOB  CV: Negative for chest pain, dizziness or palpitations  GI: Negative for nausea or abdominal pain  MS: Negative for acute myalgias or arthralgias   Neuro: Negative for HA, weakness or paresthesia  Psych: Negative for depression or anxiety       Assessment/Plan:  Details of this discussion including any medical advice provided: Patient advised as a precaution to stay at home, practice regular hand washing with soap and warm water and to wear a mask and utilize social distancing when necessary to be out in public places. ICD-10-CM ICD-9-CM    1. Coronary artery disease due to lipid rich plaque  I25.10 414.00 simvastatin (ZOCOR) 40 mg tablet    I25.83 414.3    2. Hyperlipidemia, unspecified hyperlipidemia type  E78.5 272.4 simvastatin (ZOCOR) 40 mg tablet   3. Right knee pain, unspecified chronicity  M25.561 719.46 gabapentin (NEURONTIN) 300 mg capsule   4. Encounter for medication refill  Z76.0 V68.1 potassium chloride SA (MICRO-K) 10 mEq capsule       Symptomatic therapy suggested: call prn if symptoms persist or worsen. Total Time: minutes: 11-20 minutes was spent on phone discussing above problems and plan. Patient medical history, prior OV notes, vitals flow sheet, lab results, medications, and allergies were reviewed during this encounter.     For phone encounters:  I affirm this is a patient initiated episode with an established Patient who has not had a related appointment within my department in the past 7 days or scheduled within the next 24 hours.     Note: not billable if this call serves to triage the patient into an appointment for the relevant concern        MD LINA Mckinney & AVELINA REDMAN [facility]  12/11/20

## 2020-12-11 NOTE — PROGRESS NOTES
1. Have you been to the ER, urgent care clinic since your last visit? Hospitalized since your last visit? Yes When: urgent  care in [place] - sinus infection     2. Have you seen or consulted any other health care providers outside of the [address] [place] [address] since your last visit? Include any pap smears or colon screening.  No        Health Maintenance Due   Topic Date Due    Breast Cancer Screen Mammogram  05/20/2017    GLAUCOMA SCREENING Q2Y  10/01/2020

## 2021-01-04 DIAGNOSIS — B96.89 ACUTE BACTERIAL RHINOSINUSITIS: ICD-10-CM

## 2021-01-04 DIAGNOSIS — K21.9 GASTROESOPHAGEAL REFLUX DISEASE WITHOUT ESOPHAGITIS: ICD-10-CM

## 2021-01-04 DIAGNOSIS — J01.90 ACUTE BACTERIAL RHINOSINUSITIS: ICD-10-CM

## 2021-01-04 DIAGNOSIS — F41.8 DEPRESSION WITH ANXIETY: ICD-10-CM

## 2021-01-06 RX ORDER — ESOMEPRAZOLE MAGNESIUM 40 MG/1
CAPSULE, DELAYED RELEASE ORAL
Qty: 90 CAP | Refills: 0 | Status: SHIPPED | OUTPATIENT
Start: 2021-01-06 | End: 2021-04-15 | Stop reason: SDUPTHER

## 2021-01-06 RX ORDER — LORAZEPAM 1 MG/1
TABLET ORAL
Qty: 60 TAB | Refills: 0 | Status: SHIPPED | OUTPATIENT
Start: 2021-01-06 | End: 2021-02-05

## 2021-01-06 RX ORDER — ALBUTEROL SULFATE 90 UG/1
2 AEROSOL, METERED RESPIRATORY (INHALATION)
Qty: 1 INHALER | Refills: 1 | Status: SHIPPED | OUTPATIENT
Start: 2021-01-06 | End: 2022-01-27 | Stop reason: SDUPTHER

## 2021-01-15 DIAGNOSIS — F41.8 DEPRESSION WITH ANXIETY: ICD-10-CM

## 2021-01-15 NOTE — TELEPHONE ENCOUNTER
----- Message from [facility] sent at 1/15/2021  9:45 AM EST -----  Regarding: Dr. Liam Menchaca (if not patient):N/A  Relationship of caller (if not patient):N/A  Best contact number(s):[phone]  Name of medication and dosage if known:buPROPion XL (WELLBUTRIN XL) 150 mg tablet X 90 DS  Is patient out of this medication (yes/no):Yes  Pharmacy name:[facility]  Pharmacy listed in chart? (yes/no):Yes  Pharmacy phone [place]  Date of last visit:12/11/20  Details to clarify the request:Pt states she has Qty #5 pills left.

## 2021-01-18 RX ORDER — BUPROPION HYDROCHLORIDE 150 MG/1
TABLET ORAL
Qty: 90 TAB | Refills: 1 | Status: SHIPPED | OUTPATIENT
Start: 2021-01-18 | End: 2021-02-22

## 2021-01-22 DIAGNOSIS — I25.10 CORONARY ARTERY DISEASE DUE TO LIPID RICH PLAQUE: ICD-10-CM

## 2021-01-22 DIAGNOSIS — I25.83 CORONARY ARTERY DISEASE DUE TO LIPID RICH PLAQUE: ICD-10-CM

## 2021-01-25 RX ORDER — CLOPIDOGREL BISULFATE 75 MG/1
75 TABLET ORAL DAILY
Qty: 90 TAB | Refills: 1 | Status: SHIPPED | OUTPATIENT
Start: 2021-01-25 | End: 2021-04-23

## 2021-02-01 ENCOUNTER — TELEPHONE (OUTPATIENT)
Dept: FAMILY MEDICINE CLINIC | Age: 71
End: 2021-02-01

## 2021-02-01 NOTE — TELEPHONE ENCOUNTER
Spoke with patient. Advised patient per Dr. Latisha Hirsch that she could receive Covid 19 vaccination. Patient verbalized understanding.

## 2021-02-01 NOTE — TELEPHONE ENCOUNTER
Pt is able to get the VIRUS vaccination if it is ok with her PCP since she had open heart surgery. . Please call her back today. Transportation is being arranged today. Asking if Nurse could please call her back today.  Thanks

## 2021-02-02 DIAGNOSIS — F41.8 DEPRESSION WITH ANXIETY: ICD-10-CM

## 2021-02-02 RX ORDER — LORAZEPAM 1 MG/1
TABLET ORAL
Qty: 60 TAB | Refills: 0 | OUTPATIENT
Start: 2021-02-02

## 2021-02-22 ENCOUNTER — VIRTUAL VISIT (OUTPATIENT)
Dept: FAMILY MEDICINE CLINIC | Age: 71
End: 2021-02-22
Payer: MEDICARE

## 2021-02-22 DIAGNOSIS — B96.89 ACUTE BACTERIAL RHINOSINUSITIS: ICD-10-CM

## 2021-02-22 DIAGNOSIS — J01.90 ACUTE BACTERIAL RHINOSINUSITIS: ICD-10-CM

## 2021-02-22 PROCEDURE — 99442 PR PHYS/QHP TELEPHONE EVALUATION 11-20 MIN: CPT | Performed by: FAMILY MEDICINE

## 2021-02-22 RX ORDER — AMOXICILLIN AND CLAVULANATE POTASSIUM 875; 125 MG/1; MG/1
1 TABLET, FILM COATED ORAL 2 TIMES DAILY
Qty: 14 TAB | Refills: 0 | Status: SHIPPED | OUTPATIENT
Start: 2021-02-22 | End: 2021-03-01

## 2021-02-22 NOTE — PROGRESS NOTES
Stacia Peterson is a 78 y.o. female evaluated via telephone on 21. Patient Identity confirmed by . Telephone encounter done in lieu of office visit due to extraordinary circumstances. A state of national and state emergency has been declared by the President and the [place] due to the [employer] pandemic. Pursuant to the emergency declaration under the [facility] [place] [address] [facility] authority and the [employer] and [employer] Act, this Virtual  Visit was conducted, with patient's consent, to reduce the patient's risk of exposure to COVID-19 and provide continuity of care for an established patient. Mary Jane Evangelista LPN coordinated virtual visit    Consent:  Patient and/or health care decision maker is aware that he/she may receive a bill for this telephone encounter, depending on his insurance coverage, and has provided verbal consent to proceed: Yes    Physician Location: Office  Patient Location: Home    CC: sinus infection  Information gathered from patient and/or health care decision maker. HPI: Stacia Peterson is a 78 y.o. female who was evaluated by synchronous (real-time) audio technology from his/her home. Patient complains of congestion, dry cough and bilateral sinus pain for 3 days. no nausea and no vomiting. she has not had  myalgias, headache, fever and chills. Symptoms are moderate. There have not been contacts with similar infections. Encounter Diagnoses   Name Primary?  Acute bacterial rhinosinusitis          Current Outpatient Medications:     amoxicillin-clavulanate (AUGMENTIN) 875-125 mg per tablet, Take 1 Tab by mouth two (2) times a day for 7 days. , Disp: 14 Tab, Rfl: 0    LORazepam (ATIVAN) 1 mg tablet, TAKE ONE TABLET BY MOUTH EVERY 8 HOURS AS NEEDED FOR ANXIETY. MAX DAILY AMOUNT IS 3MG, Disp: 60 Tab, Rfl: 0    clopidogreL (PLAVIX) 75 mg tab, Take 1 Tab by mouth daily. , Disp: 90 Tab, Rfl: 1    esomeprazole (NEXIUM) 40 mg capsule, TAKE ONE CAPSULE BY MOUTH EVERY DAY, Disp: 90 Cap, Rfl: 0    albuterol (PROVENTIL HFA, VENTOLIN HFA, PROAIR HFA) 90 mcg/actuation inhaler, Take 2 Puffs by inhalation every four (4) hours as needed for Wheezing., Disp: 1 Inhaler, Rfl: 1    simvastatin (ZOCOR) 40 mg tablet, Take 1 Tab by mouth nightly., Disp: 90 Tab, Rfl: 1    atenoloL (TENORMIN) 50 mg tablet, Take 1 Tab by mouth daily. , Disp: 90 Tab, Rfl: 0    cetirizine (ZyrTEC) 10 mg tablet, Take  by mouth., Disp: , Rfl:     montelukast (SINGULAIR) 10 mg tablet, Take 1 Tab by mouth daily. , Disp: 30 Tab, Rfl: 2    fluticasone propionate (FLONASE) 50 mcg/actuation nasal spray, USE 2 SPRAYS IN BOTH NOSTRILS DAILY, Disp: 16 g, Rfl: 2    furosemide (LASIX) 20 mg tablet, TAKE ONE TABLET BY MOUTH EVERY DAY, Disp: 90 Tab, Rfl: 0    nitroglycerin (NITROSTAT) 0.4 mg SL tablet, 1 Tab by SubLINGual route as needed for Chest Pain., Disp: 1 Bottle, Rfl: 3    aspirin 81 mg tablet, Take 81 mg by mouth.  , Disp: , Rfl:     miscellaneous medical supply (BLOOD PRESSURE CUFF) misc, Blood Pressure Cuff That Talks. , Disp: 1 Each, Rfl: 0     Allergies   Allergen Reactions    Percocet [Oxycodone-Acetaminophen] Itching        Patient Active Problem List    Diagnosis Date Noted    [facility]    Gastroesophageal reflux disease without esophagitis 02/03/2016    Allergic rhinitis 10/04/2012    Insomnia 06/05/2012    B12 deficiency 11/04/2011    Osteopenia 06/03/2011    Anxiety 05/16/2011    Hyperlipidemia 05/16/2011    CAD (coronary artery disease) 05/16/2011    Blindness 05/16/2011    HTN (hypertension) 05/16/2011        Review of Systems:  Constitutional: Negative for fatigue, malaise  Resp: Negative for cough, wheezing or SOB  CV: Negative for chest pain, dizziness or palpitations  GI: Negative for nausea or abdominal pain  MS: Negative for acute myalgias or arthralgias   Neuro: Negative for HA, weakness or paresthesia  Psych: Negative for depression or anxiety       Assessment/Plan:  Details of this discussion including any medical advice provided: Patient advised as a precaution to stay at home, practice regular hand washing with soap and warm water and to wear a mask and utilize social distancing when necessary to be out in public places. ICD-10-CM ICD-9-CM    1. Acute bacterial rhinosinusitis  J01.90 461.9 amoxicillin-clavulanate (AUGMENTIN) 875-125 mg per tablet    B96.89         Symptomatic therapy suggested: rest, increase fluids and call prn if symptoms persist or worsen. Total Time: minutes: 11-20 minutes was spent addressing above problems and plan. Patient medical history, prior OV notes, vitals flow sheet, lab results, medications, and allergies were reviewed during this encounter. All of the patient's questions were addressed and answered to apparent satisfaction. The patient understands and agrees with our plan of care. The patient knows to call back if they have questions about the plan of care or if symptoms change. For phone encounters:  I affirm this is a patient initiated episode with an established Patient who has not had a related appointment within my department in the past 7 days or scheduled within the next 24 hours.     Note: not billable if this call serves to triage the patient into an appointment for the relevant concern        MD LINA Azar & AVELINA REDMAN [facility]  02/28/21

## 2021-02-22 NOTE — PROGRESS NOTES
1. Have you been to the ER, urgent care clinic since your last visit? Hospitalized since your last visit? No    2. Have you seen or consulted any other health care providers outside of the [address] [place] [address] since your last visit? Include any pap smears or colon screening.  No        Health Maintenance Due   Topic Date Due    COVID-19 Vaccine (1 of 2) 04/22/1966    Breast Cancer Screen Mammogram  05/20/2017    GLAUCOMA SCREENING Q2Y  10/01/2020

## 2021-03-04 DIAGNOSIS — I25.83 CORONARY ARTERY DISEASE DUE TO LIPID RICH PLAQUE: ICD-10-CM

## 2021-03-04 DIAGNOSIS — M25.561 RIGHT KNEE PAIN, UNSPECIFIED CHRONICITY: ICD-10-CM

## 2021-03-04 DIAGNOSIS — I25.10 CORONARY ARTERY DISEASE DUE TO LIPID RICH PLAQUE: ICD-10-CM

## 2021-03-05 DIAGNOSIS — F41.8 DEPRESSION WITH ANXIETY: ICD-10-CM

## 2021-03-05 RX ORDER — GABAPENTIN 300 MG/1
CAPSULE ORAL
Qty: 90 CAP | Refills: 0 | Status: SHIPPED | OUTPATIENT
Start: 2021-03-05 | End: 2021-04-15 | Stop reason: SDUPTHER

## 2021-03-05 RX ORDER — ATENOLOL 50 MG/1
50 TABLET ORAL DAILY
Qty: 90 TAB | Refills: 0 | Status: SHIPPED | OUTPATIENT
Start: 2021-03-05 | End: 2021-06-16

## 2021-03-05 RX ORDER — LORAZEPAM 1 MG/1
TABLET ORAL
Qty: 60 TAB | Refills: 0 | Status: SHIPPED | OUTPATIENT
Start: 2021-03-05 | End: 2021-04-02 | Stop reason: SDUPTHER

## 2021-03-11 ENCOUNTER — VIRTUAL VISIT (OUTPATIENT)
Dept: FAMILY MEDICINE CLINIC | Age: 71
End: 2021-03-11
Payer: MEDICARE

## 2021-03-11 DIAGNOSIS — J01.90 ACUTE BACTERIAL RHINOSINUSITIS: Primary | ICD-10-CM

## 2021-03-11 DIAGNOSIS — B96.89 ACUTE BACTERIAL RHINOSINUSITIS: Primary | ICD-10-CM

## 2021-03-11 PROCEDURE — 99442 PR PHYS/QHP TELEPHONE EVALUATION 11-20 MIN: CPT | Performed by: STUDENT IN AN ORGANIZED HEALTH CARE EDUCATION/TRAINING PROGRAM

## 2021-03-11 RX ORDER — LEVOFLOXACIN 750 MG/1
750 TABLET ORAL DAILY
Qty: 5 TAB | Refills: 0 | Status: SHIPPED | OUTPATIENT
Start: 2021-03-11 | End: 2021-03-16

## 2021-03-11 RX ORDER — GUAIFENESIN 600 MG/1
600 TABLET, EXTENDED RELEASE ORAL
Qty: 30 TAB | Refills: 0 | Status: SHIPPED | OUTPATIENT
Start: 2021-03-11 | End: 2021-06-08

## 2021-03-11 RX ORDER — BENZONATATE 200 MG/1
200 CAPSULE ORAL
Qty: 21 CAP | Refills: 0 | Status: SHIPPED | OUTPATIENT
Start: 2021-03-11 | End: 2021-03-18

## 2021-03-11 NOTE — PROGRESS NOTES
Jeny Javier (: 1950) is a 78 y.o. female, established patient, here for evaluation of the following chief complaint(s):   URI       ASSESSMENT/PLAN:  1. Acute bacterial rhinosinusitis  Comments:  Failed treatment with Augmentin, will attempt Levaquin as well as symptomatic treatments. Orders:  -     benzonatate (TESSALON) 200 mg capsule; Take 1 Cap by mouth three (3) times daily as needed for Cough for up to 7 days. , Normal, Disp-21 Cap, R-0  -     levoFLOXacin (LEVAQUIN) 750 mg tablet; Take 1 Tab by mouth daily for 5 days. , Normal, Disp-5 Tab, R-0  -     guaiFENesin ER (MUCINEX) 600 mg ER tablet; Take 1 Tab by mouth two (2) times daily as needed for Congestion. , Normal, Disp-30 Tab, R-0      Return in about 1 week (around 3/18/2021). SUBJECTIVE/OBJECTIVE:  URI symptoms- Symptoms never really resolved from treatment with Augmentin on . Now these have worsened and include non-productive cough, pharyngeal exudates, severe nasal congestion, bilateral ear pressure, difficulty sleeping. Meds attempted: nasal spray, zyrtec, singulair. Pt did get second covid shot on last Friday. Review of Systems   Constitutional: Negative for chills, fatigue and fever. HENT: Positive for congestion, ear pain, postnasal drip, rhinorrhea, sinus pressure, sinus pain and voice change. Negative for facial swelling, hearing loss and sore throat. Respiratory: Positive for cough. Negative for apnea and shortness of breath. Genitourinary: Negative for difficulty urinating, dysuria, frequency and genital sores. Patient-Reported Vitals 3/11/2021   Patient-Reported Temperature 98.4   Patient-Reported Systolic  -     Physical Exam  Vitals reviewed: General: Patient speaking in complete sentences without effort. Normal speech and cooperative.             Current Outpatient Medications:     benzonatate (TESSALON) 200 mg capsule, Take 1 Cap by mouth three (3) times daily as needed for Cough for up to 7 days. , Disp: 21 Cap, Rfl: 0    levoFLOXacin (LEVAQUIN) 750 mg tablet, Take 1 Tab by mouth daily for 5 days. , Disp: 5 Tab, Rfl: 0    guaiFENesin ER (MUCINEX) 600 mg ER tablet, Take 1 Tab by mouth two (2) times daily as needed for Congestion. , Disp: 30 Tab, Rfl: 0    atenoloL (TENORMIN) 50 mg tablet, Take 1 Tab by mouth daily. , Disp: 90 Tab, Rfl: 0    gabapentin (NEURONTIN) 300 mg capsule, TAKE ONE CAPSULE BY MOUTH 3 TIMES A DAY AS NEEDED FOR PAIN  Indications: \"change of life\" signs, Disp: 90 Cap, Rfl: 0    LORazepam (ATIVAN) 1 mg tablet, TAKE ONE TABLET BY MOUTH EVERY 8 HOURS AS NEEDED FOR ANXIETY. MAX DAILY AMOUNT IS 3MG, Disp: 60 Tab, Rfl: 0    clopidogreL (PLAVIX) 75 mg tab, Take 1 Tab by mouth daily. , Disp: 90 Tab, Rfl: 1    esomeprazole (NEXIUM) 40 mg capsule, TAKE ONE CAPSULE BY MOUTH EVERY DAY, Disp: 90 Cap, Rfl: 0    albuterol (PROVENTIL HFA, VENTOLIN HFA, PROAIR HFA) 90 mcg/actuation inhaler, Take 2 Puffs by inhalation every four (4) hours as needed for Wheezing., Disp: 1 Inhaler, Rfl: 1    simvastatin (ZOCOR) 40 mg tablet, Take 1 Tab by mouth nightly., Disp: 90 Tab, Rfl: 1    cetirizine (ZyrTEC) 10 mg tablet, Take  by mouth., Disp: , Rfl:     montelukast (SINGULAIR) 10 mg tablet, Take 1 Tab by mouth daily. , Disp: 30 Tab, Rfl: 2    fluticasone propionate (FLONASE) 50 mcg/actuation nasal spray, USE 2 SPRAYS IN BOTH NOSTRILS DAILY, Disp: 16 g, Rfl: 2    furosemide (LASIX) 20 mg tablet, TAKE ONE TABLET BY MOUTH EVERY DAY, Disp: 90 Tab, Rfl: 0    miscellaneous medical supply (BLOOD PRESSURE CUFF) misc, Blood Pressure Cuff That Talks. , Disp: 1 Each, Rfl: 0    nitroglycerin (NITROSTAT) 0.4 mg SL tablet, 1 Tab by SubLINGual route as needed for Chest Pain., Disp: 1 Bottle, Rfl: 3    aspirin 81 mg tablet, Take 81 mg by mouth.  , Disp: , Rfl:           Chyna Alvares, was evaluated through a synchronous (real-time) audio-video encounter.  The patient (or guardian if applicable) is aware that this is a billable service. Verbal consent to proceed has been obtained within the past 12 months. The visit was conducted pursuant to the emergency declaration under the [address] [place] [address] and the [employer] General Act. Patient identification was verified, and a caregiver was present when appropriate. The patient was located in a state where the provider was credentialed to provide care. An electronic signature was used to authenticate this note.   -- Jayy Aparicio MD

## 2021-03-11 NOTE — PROGRESS NOTES
[address] Medicine Residency Attending Addendum:  Dr. Precious De La Garza MD,  the patient and I were not physically present during this encounter. The resident and I are concurrently monitoring the patient care through appropriate telecommunication technology. I discussed the findings, assessment and plan with the resident and agree with the resident's findings and plan as documented in the resident's note.       Jagdeep Nguyen MD

## 2021-03-22 ENCOUNTER — TELEPHONE (OUTPATIENT)
Dept: FAMILY MEDICINE CLINIC | Age: 71
End: 2021-03-22

## 2021-03-22 DIAGNOSIS — I25.83 CORONARY ARTERY DISEASE DUE TO LIPID RICH PLAQUE: ICD-10-CM

## 2021-03-22 DIAGNOSIS — E78.5 HYPERLIPIDEMIA, UNSPECIFIED HYPERLIPIDEMIA TYPE: ICD-10-CM

## 2021-03-22 DIAGNOSIS — I25.10 CORONARY ARTERY DISEASE DUE TO LIPID RICH PLAQUE: ICD-10-CM

## 2021-03-22 RX ORDER — PREDNISONE 20 MG/1
20 TABLET ORAL 2 TIMES DAILY
Qty: 10 TAB | Refills: 0 | Status: SHIPPED | OUTPATIENT
Start: 2021-03-22 | End: 2021-03-27

## 2021-03-22 RX ORDER — SIMVASTATIN 40 MG/1
40 TABLET, FILM COATED ORAL
Qty: 90 TAB | Refills: 1 | Status: SHIPPED | OUTPATIENT
Start: 2021-03-22 | End: 2021-09-03 | Stop reason: SDUPTHER

## 2021-03-22 NOTE — TELEPHONE ENCOUNTER
If levaquin did not resolve her symptoms then they are  not due to a bacterial infection. A steroid dose pack should help.

## 2021-03-22 NOTE — TELEPHONE ENCOUNTER
Pt states that she finished the Levaquin and still seems to be hanging on. Pt states she would like another refill before.  If you can please send over by 2 so [facility] can deliver this to her

## 2021-03-22 NOTE — TELEPHONE ENCOUNTER
Called and spoke to patient. Advised her per Dr. Angelo Solo:     If Valorie Fernandez did not resolve her symptoms then they are  not due to a bacterial infection. A steroid dose pack should help. Patient states she would like the medication sent to [facility].

## 2021-03-24 DIAGNOSIS — I25.10 CORONARY ARTERY DISEASE DUE TO LIPID RICH PLAQUE: ICD-10-CM

## 2021-03-24 DIAGNOSIS — E78.5 HYPERLIPIDEMIA, UNSPECIFIED HYPERLIPIDEMIA TYPE: ICD-10-CM

## 2021-03-24 DIAGNOSIS — I25.83 CORONARY ARTERY DISEASE DUE TO LIPID RICH PLAQUE: ICD-10-CM

## 2021-03-24 RX ORDER — SIMVASTATIN 40 MG/1
40 TABLET, FILM COATED ORAL
Qty: 90 TAB | Refills: 1 | Status: CANCELLED | OUTPATIENT
Start: 2021-03-24

## 2021-03-24 NOTE — TELEPHONE ENCOUNTER
----- Message from Usha Warren sent at 3/24/2021  9:44 AM EDT -----  Regarding: Medication Refill  Contact: [phone]  Medication Refill    Caller (if not patient): NA      Relationship of caller (if not patient): Self       Best contact number(s): [phone]      Name of medication and dosage if known: Simvastatin 40 mg      Is patient out of this medication (yes/no): No      Pharmacy name:[facility]      Pharmacy listed in chart? (yes/no): Yes  Pharmacy phone number: NA      Details to clarify the request: Patient is advising has two pills remaining and called in request on Monday, please fill as soon as possible.        Usha Warren

## 2021-03-24 NOTE — TELEPHONE ENCOUNTER
Returned call to pt. Pt made aware that the medication was sent to [address] [place] [address] 2 days ago on 3/22 so call the pharmacy.  She verbalized understanding

## 2021-04-02 DIAGNOSIS — F41.8 DEPRESSION WITH ANXIETY: ICD-10-CM

## 2021-04-02 RX ORDER — LORAZEPAM 1 MG/1
TABLET ORAL
Qty: 60 TAB | Refills: 0 | Status: SHIPPED | OUTPATIENT
Start: 2021-04-02 | End: 2021-05-07

## 2021-04-02 NOTE — TELEPHONE ENCOUNTER
----- Message from Eric Solo sent at 4/2/2021 12:55 PM EDT -----  Regarding: Dr. Scar Lang (if not patient): Pt.       Relationship of caller (if not patient): N/a. Best contact number(s): [phone]      Name of medication and dosage if known: \"Lorazapam\". Is patient out of this medication (yes/no): No, has enough for weekend. Pharmacy name: [facility]. Pharmacy listed in chart? (yes/no): Yes. Pharmacy phone number: [phone]      Details to clarify the request: N/a.       Eric Solo

## 2021-04-15 DIAGNOSIS — M25.561 RIGHT KNEE PAIN, UNSPECIFIED CHRONICITY: ICD-10-CM

## 2021-04-15 DIAGNOSIS — K21.9 GASTROESOPHAGEAL REFLUX DISEASE WITHOUT ESOPHAGITIS: ICD-10-CM

## 2021-04-15 NOTE — TELEPHONE ENCOUNTER
----- Message from Arjun Gao sent at 4/15/2021  8:51 AM EDT -----  Regarding: Dr. Yossi Romero / refill  Medication Refill    Caller (if not patient): pt      Relationship of caller (if not patient):n/a      Best contact number(s):[phone]      Name of medication and dosage if known: nexium , gabapentin,      Is patient out of this medication (yes/no): yes 1 caplet left      [facility]    Pharmacy listed in chart? (yes/no): yes    [facility]      Details to clarify the request:n/a      Arjun Gao

## 2021-04-16 RX ORDER — GABAPENTIN 300 MG/1
CAPSULE ORAL
Qty: 90 CAP | Refills: 0 | Status: SHIPPED | OUTPATIENT
Start: 2021-04-16 | End: 2021-06-08 | Stop reason: SDUPTHER

## 2021-04-16 RX ORDER — ESOMEPRAZOLE MAGNESIUM 40 MG/1
CAPSULE, DELAYED RELEASE ORAL
Qty: 90 CAP | Refills: 0 | Status: SHIPPED | OUTPATIENT
Start: 2021-04-16 | End: 2021-07-01

## 2021-04-21 DIAGNOSIS — I25.10 CORONARY ARTERY DISEASE DUE TO LIPID RICH PLAQUE: ICD-10-CM

## 2021-04-21 DIAGNOSIS — I25.83 CORONARY ARTERY DISEASE DUE TO LIPID RICH PLAQUE: ICD-10-CM

## 2021-04-23 RX ORDER — CLOPIDOGREL BISULFATE 75 MG/1
75 TABLET ORAL DAILY
Qty: 90 TAB | Refills: 1 | OUTPATIENT
Start: 2021-04-23

## 2021-06-03 DIAGNOSIS — M25.561 RIGHT KNEE PAIN, UNSPECIFIED CHRONICITY: ICD-10-CM

## 2021-06-03 RX ORDER — GABAPENTIN 300 MG/1
CAPSULE ORAL
Qty: 90 CAPSULE | Refills: 0 | Status: CANCELLED | OUTPATIENT
Start: 2021-06-03

## 2021-06-04 NOTE — TELEPHONE ENCOUNTER
Called and spoke to patient. Advised her of office visit needed. She states she does not have family to bring her and the friend that used to bring her, she no longer has. She asked for virtual visit but I advised her of recommended in office visit. States she will call back when she is able to arrange transportation.

## 2021-06-08 ENCOUNTER — OFFICE VISIT (OUTPATIENT)
Dept: FAMILY MEDICINE CLINIC | Age: 71
End: 2021-06-08
Payer: MEDICARE

## 2021-06-08 VITALS
HEIGHT: 61 IN | DIASTOLIC BLOOD PRESSURE: 76 MMHG | TEMPERATURE: 99 F | RESPIRATION RATE: 18 BRPM | OXYGEN SATURATION: 98 % | WEIGHT: 177.2 LBS | SYSTOLIC BLOOD PRESSURE: 120 MMHG | BODY MASS INDEX: 33.46 KG/M2 | HEART RATE: 69 BPM

## 2021-06-08 DIAGNOSIS — E78.5 HYPERLIPIDEMIA, UNSPECIFIED HYPERLIPIDEMIA TYPE: ICD-10-CM

## 2021-06-08 DIAGNOSIS — F41.8 DEPRESSION WITH ANXIETY: ICD-10-CM

## 2021-06-08 DIAGNOSIS — I10 ESSENTIAL HYPERTENSION: Primary | ICD-10-CM

## 2021-06-08 DIAGNOSIS — I25.10 CORONARY ARTERY DISEASE DUE TO LIPID RICH PLAQUE: ICD-10-CM

## 2021-06-08 DIAGNOSIS — F33.1 MODERATE EPISODE OF RECURRENT MAJOR DEPRESSIVE DISORDER (HCC): ICD-10-CM

## 2021-06-08 DIAGNOSIS — J30.2 SEASONAL ALLERGIC RHINITIS, UNSPECIFIED TRIGGER: ICD-10-CM

## 2021-06-08 DIAGNOSIS — I25.83 CORONARY ARTERY DISEASE DUE TO LIPID RICH PLAQUE: ICD-10-CM

## 2021-06-08 DIAGNOSIS — M25.561 RIGHT KNEE PAIN, UNSPECIFIED CHRONICITY: ICD-10-CM

## 2021-06-08 PROCEDURE — G8417 CALC BMI ABV UP PARAM F/U: HCPCS | Performed by: FAMILY MEDICINE

## 2021-06-08 PROCEDURE — 1101F PT FALLS ASSESS-DOCD LE1/YR: CPT | Performed by: FAMILY MEDICINE

## 2021-06-08 PROCEDURE — G9717 DOC PT DX DEP/BP F/U NT REQ: HCPCS | Performed by: FAMILY MEDICINE

## 2021-06-08 PROCEDURE — G8427 DOCREV CUR MEDS BY ELIG CLIN: HCPCS | Performed by: FAMILY MEDICINE

## 2021-06-08 PROCEDURE — G8400 PT W/DXA NO RESULTS DOC: HCPCS | Performed by: FAMILY MEDICINE

## 2021-06-08 PROCEDURE — G8536 NO DOC ELDER MAL SCRN: HCPCS | Performed by: FAMILY MEDICINE

## 2021-06-08 PROCEDURE — 3017F COLORECTAL CA SCREEN DOC REV: CPT | Performed by: FAMILY MEDICINE

## 2021-06-08 PROCEDURE — 99214 OFFICE O/P EST MOD 30 MIN: CPT | Performed by: FAMILY MEDICINE

## 2021-06-08 PROCEDURE — 1090F PRES/ABSN URINE INCON ASSESS: CPT | Performed by: FAMILY MEDICINE

## 2021-06-08 PROCEDURE — G8752 SYS BP LESS 140: HCPCS | Performed by: FAMILY MEDICINE

## 2021-06-08 PROCEDURE — G8754 DIAS BP LESS 90: HCPCS | Performed by: FAMILY MEDICINE

## 2021-06-08 RX ORDER — LORAZEPAM 1 MG/1
1 TABLET ORAL
Qty: 60 TABLET | Refills: 0 | Status: SHIPPED | OUTPATIENT
Start: 2021-06-08 | End: 2021-07-06

## 2021-06-08 RX ORDER — GABAPENTIN 300 MG/1
CAPSULE ORAL
Qty: 90 CAPSULE | Refills: 0 | Status: SHIPPED | OUTPATIENT
Start: 2021-06-08 | End: 2022-01-10 | Stop reason: SDUPTHER

## 2021-06-08 NOTE — PROGRESS NOTES
1. Have you been to the ER, urgent care clinic since your last visit? Hospitalized since your last visit? No    2. Have you seen or consulted any other health care providers outside of the [address] [place] [address] since your last visit? Include any pap smears or colon screening. No    Reviewed record in preparation for visit and have necessary documentation  Goals that were addressed and/or need to be completed during or after this appointment include     Health Maintenance Due   Topic Date Due    COVID-19 Vaccine (1) Never done    Breast Cancer Screen Mammogram  05/20/2017    Lipid Screen  06/15/2021       Patient is accompanied by self I have received verbal consent from Amy Claire to discuss any/all medical information while they are present in the room.

## 2021-06-09 ENCOUNTER — TELEPHONE (OUTPATIENT)
Dept: FAMILY MEDICINE CLINIC | Age: 71
End: 2021-06-09

## 2021-06-09 DIAGNOSIS — J01.90 ACUTE BACTERIAL RHINOSINUSITIS: ICD-10-CM

## 2021-06-09 DIAGNOSIS — B96.89 ACUTE BACTERIAL RHINOSINUSITIS: ICD-10-CM

## 2021-06-09 LAB
ALBUMIN SERPL-MCNC: 3.8 G/DL (ref 3.5–5)
ALBUMIN/GLOB SERPL: 1.1 {RATIO} (ref 1.1–2.2)
ALP SERPL-CCNC: 84 U/L (ref 45–117)
ALT SERPL-CCNC: 21 U/L (ref 12–78)
ANION GAP SERPL CALC-SCNC: 9 MMOL/L (ref 5–15)
AST SERPL-CCNC: 19 U/L (ref 15–37)
BILIRUB SERPL-MCNC: 0.4 MG/DL (ref 0.2–1)
BUN SERPL-MCNC: 12 MG/DL (ref 6–20)
BUN/CREAT SERPL: 15 (ref 12–20)
CALCIUM SERPL-MCNC: 9.2 MG/DL (ref 8.5–10.1)
CHLORIDE SERPL-SCNC: 109 MMOL/L (ref 97–108)
CHOLEST SERPL-MCNC: 176 MG/DL
CO2 SERPL-SCNC: 26 MMOL/L (ref 21–32)
CREAT SERPL-MCNC: 0.82 MG/DL (ref 0.55–1.02)
ERYTHROCYTE [DISTWIDTH] IN BLOOD BY AUTOMATED COUNT: 12.9 % (ref 11.5–14.5)
GLOBULIN SER CALC-MCNC: 3.6 G/DL (ref 2–4)
GLUCOSE SERPL-MCNC: 92 MG/DL (ref 65–100)
HCT VFR BLD AUTO: 39.4 % (ref 35–47)
HDLC SERPL-MCNC: 54 MG/DL
HDLC SERPL: 3.3 {RATIO} (ref 0–5)
HGB BLD-MCNC: 12.7 G/DL (ref 11.5–16)
LDLC SERPL CALC-MCNC: 103.4 MG/DL (ref 0–100)
MCH RBC QN AUTO: 32.6 PG (ref 26–34)
MCHC RBC AUTO-ENTMCNC: 32.2 G/DL (ref 30–36.5)
MCV RBC AUTO: 101.3 FL (ref 80–99)
NRBC # BLD: 0 K/UL (ref 0–0.01)
NRBC BLD-RTO: 0 PER 100 WBC
PLATELET # BLD AUTO: 171 K/UL (ref 150–400)
PMV BLD AUTO: 10.8 FL (ref 8.9–12.9)
POTASSIUM SERPL-SCNC: 4.3 MMOL/L (ref 3.5–5.1)
PROT SERPL-MCNC: 7.4 G/DL (ref 6.4–8.2)
RBC # BLD AUTO: 3.89 M/UL (ref 3.8–5.2)
SODIUM SERPL-SCNC: 144 MMOL/L (ref 136–145)
TRIGL SERPL-MCNC: 93 MG/DL (ref ?–150)
TSH SERPL DL<=0.05 MIU/L-ACNC: 1.12 UIU/ML (ref 0.36–3.74)
VLDLC SERPL CALC-MCNC: 18.6 MG/DL
WBC # BLD AUTO: 8.1 K/UL (ref 3.6–11)

## 2021-06-09 RX ORDER — AMOXICILLIN AND CLAVULANATE POTASSIUM 875; 125 MG/1; MG/1
1 TABLET, FILM COATED ORAL 2 TIMES DAILY
Qty: 14 TABLET | Refills: 0 | Status: SHIPPED | OUTPATIENT
Start: 2021-06-09 | End: 2021-06-16

## 2021-06-09 NOTE — TELEPHONE ENCOUNTER
Returned call to patient. She stated that she can taste a little bit but can't smell anything. Patient stated she did get some Mucinex this morning when she went out to see if it would help unstuff her nose because she thinks she has a sinus infection.

## 2021-06-09 NOTE — TELEPHONE ENCOUNTER
Pt states that she saw Dr. Kwame John regarding her medications and labs for Cholesterol. She got up this morning and cannot taste nor smell anything. Please call her. She has had both of COVID19 injections.

## 2021-06-11 ENCOUNTER — TELEPHONE (OUTPATIENT)
Dept: FAMILY MEDICINE CLINIC | Age: 71
End: 2021-06-11

## 2021-06-11 NOTE — TELEPHONE ENCOUNTER
She has had a rx for an albuterol inhaler. Is she using that? A nebulizer would only be helpful if she was asthmatic.

## 2021-06-11 NOTE — TELEPHONE ENCOUNTER
Called patient. She stated that she has been using the inhaler. Advised patient nebulizer would help if she was asthmatic. She stated that she was not but thought the nebulizer would help with allergies that she is having. Appreciative of call.

## 2021-06-11 NOTE — TELEPHONE ENCOUNTER
----- Message from Rosetta Young sent at 6/11/2021  1:47 PM EDT -----  Regarding: Dr. Mine Maciel Message/Vendor Calls    Caller's first and last name: n/a      Reason for call: Wants a nebulizer for her sinus infection      Callback required yes/no and why: yes      Best contact number(s): [phone]      Details to clarify the request: [address]

## 2021-06-14 NOTE — PROGRESS NOTES
Chief Complaint   Patient presents with    Follow Up Chronic Condition    Nasal Congestion     cough-approx 5 days     she is a 73y.o. year old female who presents for follow up of her chronic medical conditions. Patient with hx of CAD, HTN, HLD, GERD, B12 deficiency, blindness, anxiety and depression. Patient denies HA, dizziness, SOB, CP, abdominal pain, dysuria. She complains of nasal congestion. She has a hx of allergic rhinitis. Hypertension:  The patient reports: Has run out of medication, no medication side effects noted, no TIA's, no chest pain on exertion, no dyspnea on exertion, no swelling of ankles. Lifestyle modification/social history: sedentary     BP Readings from Last 3 Encounters:   06/08/21 120/76   03/12/20 136/74   11/14/19 128/69     Lab Results   Component Value Date/Time    Sodium 144 06/08/2021 02:45 PM    Potassium 4.3 06/08/2021 02:45 PM    Chloride 109 (H) 06/08/2021 02:45 PM    CO2 26 06/08/2021 02:45 PM    Anion gap 9 06/08/2021 02:45 PM    Glucose 92 06/08/2021 02:45 PM    BUN 12 06/08/2021 02:45 PM    Creatinine 0.82 06/08/2021 02:45 PM    BUN/Creatinine ratio 15 06/08/2021 02:45 PM    GFR est AA >60 06/08/2021 02:45 PM    GFR est non-AA >60 06/08/2021 02:45 PM    Calcium 9.2 06/08/2021 02:45 PM    Bilirubin, total 0.4 06/08/2021 02:45 PM    ALT (SGPT) 21 06/08/2021 02:45 PM    Alk. phosphatase 84 06/08/2021 02:45 PM    Protein, total 7.4 06/08/2021 02:45 PM    Albumin 3.8 06/08/2021 02:45 PM    Globulin 3.6 06/08/2021 02:45 PM    A-G Ratio 1.1 06/08/2021 02:45 PM          Patient advised to log blood pressures at home 3-5 times monthly and bring to next visit. Call office as soon as possible if BP's over 140/90 on multiple occasions or with symptoms of dizziness, chest pain, shortness of breath, headache or ankle swelling. Our goal is to normalize the blood pressure to decrease the risks of strokes and heart attacks.  The patient is in agreement with the plan.    Hyperlipidemia    Cardiovascular risks for her are: Age, HTN, CAD    Lab Results   Component Value Date/Time    Cholesterol, total 176 06/08/2021 02:45 PM    HDL Cholesterol 54 06/08/2021 02:45 PM    LDL, calculated 103.4 (H) 06/08/2021 02:45 PM    Triglyceride 93 06/08/2021 02:45 PM    CHOL/HDL Ratio 3.3 06/08/2021 02:45 PM     Our goal is to lower hyperlipidemia to decrease the risks of strokes and heart attacks      Patient Active Problem List   Diagnosis Code    Anxiety F41.9    Hyperlipidemia E78.5    CAD (coronary artery disease) I25.10    Blindness H54.7    HTN (hypertension) I10    Osteopenia M85.80    B12 deficiency E53.8    Insomnia G47.00    Allergic rhinitis J30.9    Gastroesophageal reflux disease without esophagitis K21.9    Moderate episode of recurrent major depressive disorder ([facility] [place] [address] F33.1     Past Surgical History:   Procedure Laterality Date    HX ORTHOPAEDIC      NM CABG, VEIN, FIVE  2006    NM CARDIAC SURG PROCEDURE UNLIST       Social History     Socioeconomic History    Marital status:      Spouse name: Not on file    Number of children: Not on file    Years of education: Not on file    Highest education level: Not on file   Occupational History    Not on file   Tobacco Use    Smoking status: Never Smoker    Smokeless tobacco: Never Used   Substance and Sexual Activity    Alcohol use: Yes     Comment: social    Drug use: No    Sexual activity: Yes     Partners: Male     Birth control/protection: None   Other Topics Concern    Not on file   Social History Narrative    Not on file     Social Determinants of Health     Financial Resource Strain:     Difficulty of Paying Living Expenses:    Food Insecurity:     Worried About Running Out of Food in the Last Year:     Ran Out of Food in the Last Year:    Transportation Needs:     Lack of Transportation (Medical):      Lack of Transportation (Non-Medical):    Physical Activity:     Days of Exercise per Week:     Minutes of Exercise per Session:    Stress:     Feeling of Stress :    Social Connections:     Frequency of Communication with Friends and Family:     Frequency of Social Gatherings with Friends and Family:     Attends Jainism Services:     Active Member of Clubs or Organizations:     Attends Club or Organization Meetings:     Marital Status:    Intimate Partner Violence:     Fear of Current or Ex-Partner:     Emotionally Abused:     Physically Abused:     Sexually Abused:      Family History   Problem Relation Age of Onset    No Known Problems Mother     No Known Problems Father      Current Outpatient Medications   Medication Sig    gabapentin (NEURONTIN) 300 mg capsule TAKE ONE CAPSULE BY MOUTH 3 TIMES A DAY AS NEEDED FOR PAIN  Indications: \"change of life\" signs    LORazepam (ATIVAN) 1 mg tablet Take 1 Tablet by mouth every eight to twelve (8-12) hours as needed for Anxiety. Max Daily Amount: 3 mg.  clopidogreL (PLAVIX) 75 mg tab TAKE ONE TABLET BY MOUTH ONCE A DAY    esomeprazole (NEXIUM) 40 mg capsule TAKE ONE CAPSULE BY MOUTH EVERY DAY    simvastatin (ZOCOR) 40 mg tablet Take 1 Tab by mouth nightly.  atenoloL (TENORMIN) 50 mg tablet Take 1 Tab by mouth daily.  albuterol (PROVENTIL HFA, VENTOLIN HFA, PROAIR HFA) 90 mcg/actuation inhaler Take 2 Puffs by inhalation every four (4) hours as needed for Wheezing.  montelukast (SINGULAIR) 10 mg tablet Take 1 Tab by mouth daily.  fluticasone propionate (FLONASE) 50 mcg/actuation nasal spray USE 2 SPRAYS IN BOTH NOSTRILS DAILY    miscellaneous medical supply (BLOOD PRESSURE CUFF) misc Blood Pressure Cuff That Talks.  nitroglycerin (NITROSTAT) 0.4 mg SL tablet 1 Tab by SubLINGual route as needed for Chest Pain.  aspirin 81 mg tablet Take 81 mg by mouth.  amoxicillin-clavulanate (AUGMENTIN) 875-125 mg per tablet Take 1 Tablet by mouth two (2) times a day for 7 days.      No current facility-administered medications for this visit. Allergies   Allergen Reactions    Percocet [Oxycodone-Acetaminophen] Itching       Review of Systems:  Constitutional: Feeling well, denies fatigue, malaise  Skin: Negative for rash or lesion  HEENT: see HPI, Negative for acute hearing changes  Respiratory: Negative for cough, wheezing or SOB  Cardiovascular: Negative for chest pain, dizziness or palpitations  Gastrointestinal: Negative for nausea or abdominal pain  Genital/urinary: Negative for dysuria or voiding dysfunction  Musculoskeletal: Negative for acute myalgia or arthralgia  Neurological: Negative for HA, weakness or paresthesia  Psychlogical: Negative for depression or anxiety     Vitals:    06/08/21 1405 06/08/21 1412   BP: (!) 147/76 120/76   Pulse: 69    Resp: 18    Temp: 99 °F (37.2 °C)    TempSrc: Oral    SpO2: 98%    Weight: 177 lb 3.2 oz (80.4 kg)    Height: 5' 1\" (1.549 m)        Physical Examination:  General: Well developed, well nourished, in no acute distress  Skin: Warm and dry, no rash or lesion appreciated  Head: Normocephalic, atraumatic  Neck: Normal range of motion  Respiratory: Clear to auscultation bilaterally with symmetrical effort  Cardiovascular: Normal S1, S2, Regular rate and rhythm  Extremities: FROM, normal gait  Neurological: blind, Normal strength and sensation. Psychological: Active, alert and oriented. Affect appropriate       Diagnoses and all orders for this visit:    1. Essential hypertension  -     METABOLIC PANEL, COMPREHENSIVE; Future  -     TSH 3RD GENERATION; Future    2. Hyperlipidemia, unspecified hyperlipidemia type  -     LIPID PANEL; Future  -     METABOLIC PANEL, COMPREHENSIVE; Future    3. Coronary artery disease due to lipid rich plaque  -     LIPID PANEL; Future  -     CBC W/O DIFF; Future    4. Depression with anxiety  -     LORazepam (ATIVAN) 1 mg tablet; Take 1 Tablet by mouth every eight to twelve (8-12) hours as needed for Anxiety.  Max Daily Amount: 3 mg.    5. Right knee pain, unspecified chronicity  -     gabapentin (NEURONTIN) 300 mg capsule; TAKE ONE CAPSULE BY MOUTH 3 TIMES A DAY AS NEEDED FOR PAIN  Indications: \"change of life\" signs    6. Mod[facility]    7. Seasonal allergic rhinitis, unspecified trigger        Plan of care:  Diagnoses were discussed in detail with patient. Medication risks/benefits/side effects discussed with patient. All of the patient's questions were addressed and answered to apparent satisfaction. The patient understands and agrees with our plan of care. The patient knows to call back if they have questions about the plan of care or if symptoms change. The patient received an After-Visit Summary which contains VS, diagnoses, orders, allergy and medication lists. No future appointments. Follow-up and Dispositions    · Return in about 3 months (around 9/8/2021), or if symptoms worsen or fail to improve.

## 2021-06-17 ENCOUNTER — TELEPHONE (OUTPATIENT)
Dept: FAMILY MEDICINE CLINIC | Age: 71
End: 2021-06-17

## 2021-06-18 NOTE — TELEPHONE ENCOUNTER
Called patient. She was advised per Dr Hsu:\"Results stable and acceptable when compared with prior lab values. \" Verbalized understanding.

## 2021-07-06 ENCOUNTER — TELEPHONE (OUTPATIENT)
Dept: FAMILY MEDICINE CLINIC | Age: 71
End: 2021-07-06

## 2021-07-06 NOTE — TELEPHONE ENCOUNTER
Since Pt has had open heart surgery when she smells the Pesticides she can hardly breath. They are suppose to spray her apartment on next week. Please call Pt to let her know so she can let the landlord in her apartment know.

## 2021-07-06 NOTE — TELEPHONE ENCOUNTER
Called patient. She is asking for a note from Dr Paul Dan to give to her landlord so her apartment won't be sprayed.

## 2021-07-06 NOTE — LETTER
7/6/2021 2:27 PM    Ms. Nina [phone]      To Whom It May Concern:    Susy Dolan is currently under the care of Ellen Bernal. She has medical conditions which can be exacerbated by environmental exposures such as pesticides. Please do not spray these in her home. If there are questions or concerns please have the patient contact our office.         Sincerely,      Clifton Mckeon MD

## 2021-07-12 DIAGNOSIS — K21.9 GASTROESOPHAGEAL REFLUX DISEASE WITHOUT ESOPHAGITIS: ICD-10-CM

## 2021-07-12 RX ORDER — RANITIDINE 150 MG/1
TABLET, FILM COATED ORAL
Qty: 90 TABLET | Refills: 0 | Status: CANCELLED | OUTPATIENT
Start: 2021-07-12

## 2021-07-13 ENCOUNTER — TELEPHONE (OUTPATIENT)
Dept: FAMILY MEDICINE CLINIC | Age: 71
End: 2021-07-13

## 2021-07-13 RX ORDER — RANITIDINE 150 MG/1
150 TABLET, FILM COATED ORAL 2 TIMES DAILY
Qty: 180 TABLET | Refills: 0 | Status: SHIPPED | OUTPATIENT
Start: 2021-07-13 | End: 2021-07-13

## 2021-07-13 RX ORDER — FAMOTIDINE 40 MG/1
40 TABLET, FILM COATED ORAL DAILY
Qty: 90 TABLET | Refills: 0 | Status: SHIPPED | OUTPATIENT
Start: 2021-07-13 | End: 2021-09-16

## 2021-07-13 NOTE — TELEPHONE ENCOUNTER
Pt called for a refill and was told that they are waiting to see what alternative the doctor would prescribe.

## 2021-07-21 DIAGNOSIS — I25.10 CORONARY ARTERY DISEASE DUE TO LIPID RICH PLAQUE: ICD-10-CM

## 2021-07-21 DIAGNOSIS — I25.83 CORONARY ARTERY DISEASE DUE TO LIPID RICH PLAQUE: ICD-10-CM

## 2021-07-23 RX ORDER — CLOPIDOGREL BISULFATE 75 MG/1
TABLET ORAL
Qty: 90 TABLET | Refills: 1 | Status: SHIPPED | OUTPATIENT
Start: 2021-07-23 | End: 2022-01-20 | Stop reason: SDUPTHER

## 2021-08-02 ENCOUNTER — TELEPHONE (OUTPATIENT)
Dept: FAMILY MEDICINE CLINIC | Age: 71
End: 2021-08-02

## 2021-08-02 NOTE — TELEPHONE ENCOUNTER
Called patient. She is asking for symptoms to look for because she is concerned for her health. She was advised a barking cough, fever, hoarseness, and labored or noisy breathing. Verbalized understanding.

## 2021-08-02 NOTE — TELEPHONE ENCOUNTER
Pt states that her grand baby has croup. The baby does not live with with pt. The parents brought the baby to her to see her and she states it felt like it had a fever. What should she look for with croup? Pt is worried about her health.

## 2021-08-13 NOTE — TELEPHONE ENCOUNTER
After the Pt received her prescription for the ranitidine. Pharmacy told Pt that they no longer made this medication. They gave her the FAMOTIDINE 40 mg instead. This medication does not work well with her acid reflux either. Since she cannot get the RANITIDINE, please put her back on the [employer]

## 2021-08-16 NOTE — TELEPHONE ENCOUNTER
Called patient. She was advised per Dr Tam Burrell: \"Nexium Rx sent to pharmacy 7-1-21 and should already be taking it. \" Verbalized understanding.

## 2021-09-03 ENCOUNTER — TELEPHONE (OUTPATIENT)
Dept: FAMILY MEDICINE CLINIC | Age: 71
End: 2021-09-03

## 2021-09-03 DIAGNOSIS — I25.10 CORONARY ARTERY DISEASE DUE TO LIPID RICH PLAQUE: ICD-10-CM

## 2021-09-03 DIAGNOSIS — F41.8 DEPRESSION WITH ANXIETY: ICD-10-CM

## 2021-09-03 DIAGNOSIS — I25.83 CORONARY ARTERY DISEASE DUE TO LIPID RICH PLAQUE: ICD-10-CM

## 2021-09-03 DIAGNOSIS — E78.5 HYPERLIPIDEMIA, UNSPECIFIED HYPERLIPIDEMIA TYPE: ICD-10-CM

## 2021-09-03 RX ORDER — LORAZEPAM 1 MG/1
TABLET ORAL
Qty: 60 TABLET | Refills: 0 | Status: SHIPPED | OUTPATIENT
Start: 2021-09-03 | End: 2021-11-22

## 2021-09-03 RX ORDER — SIMVASTATIN 40 MG/1
40 TABLET, FILM COATED ORAL
Qty: 90 TABLET | Refills: 0 | Status: SHIPPED | OUTPATIENT
Start: 2021-09-03 | End: 2021-11-22 | Stop reason: SDUPTHER

## 2021-09-03 NOTE — TELEPHONE ENCOUNTER
Rx request for Ativan while pt's PCP out of office.  reviewed and appropriate. Can we please get her an appt with Dr. Elissa Navarro to f/u anxiety and get a UDS? Thanks!

## 2021-09-07 ENCOUNTER — TELEPHONE (OUTPATIENT)
Dept: FAMILY MEDICINE CLINIC | Age: 71
End: 2021-09-07

## 2021-09-08 NOTE — TELEPHONE ENCOUNTER
Called patient. She was advised she would have to provide a urine drug screen for any further refills of her Ativan. Verbalized understanding. Patient stated she would try to keep her appt with Dr Jessica Aguilar.

## 2021-09-08 NOTE — TELEPHONE ENCOUNTER
Patient has been told repeatedly that an appointment is required every 3 months for refills of controlled medications such as lorazepam. In the future I will not refill the medication unless a 3 month follow up is scheduled. If she does not keep her appointment and follow these guidelines then I will have to refer her to psychiatry for treatment of her anxiety.

## 2021-09-08 NOTE — TELEPHONE ENCOUNTER
Returned call to patient. She is asking if she has to come in and provide a UDS for her Ativan? She also stated that she doesn't want to come out because of the virus spreading.

## 2021-09-16 ENCOUNTER — OFFICE VISIT (OUTPATIENT)
Dept: FAMILY MEDICINE CLINIC | Age: 71
End: 2021-09-16
Payer: MEDICARE

## 2021-09-16 VITALS
BODY MASS INDEX: 33.27 KG/M2 | WEIGHT: 176.2 LBS | RESPIRATION RATE: 12 BRPM | HEART RATE: 55 BPM | SYSTOLIC BLOOD PRESSURE: 157 MMHG | TEMPERATURE: 98.2 F | DIASTOLIC BLOOD PRESSURE: 76 MMHG | OXYGEN SATURATION: 98 % | HEIGHT: 61 IN

## 2021-09-16 DIAGNOSIS — I25.83 CORONARY ARTERY DISEASE DUE TO LIPID RICH PLAQUE: ICD-10-CM

## 2021-09-16 DIAGNOSIS — I10 ESSENTIAL HYPERTENSION: ICD-10-CM

## 2021-09-16 DIAGNOSIS — I25.10 CORONARY ARTERY DISEASE DUE TO LIPID RICH PLAQUE: ICD-10-CM

## 2021-09-16 DIAGNOSIS — Z23 ENCOUNTER FOR IMMUNIZATION: ICD-10-CM

## 2021-09-16 DIAGNOSIS — F41.9 ANXIETY: Primary | ICD-10-CM

## 2021-09-16 LAB
AMPHETAMINE QL URINE POC: NEGATIVE
BARBITURATES UR POC: NEGATIVE
BENZODIAZEPINES UR POC: NEGATIVE
COCAINE QL URINE POC: NEGATIVE
LOT EXP DATE POC: NORMAL
LOT NUMBER POC: NORMAL
MARIJUANA (THC) QL URINE POC: NEGATIVE
MDMA/ECSTASY UR POC: NEGATIVE
METHADONE QL URINE POC: NEGATIVE
METHAMPHETAMINE QL URINE POC: NEGATIVE
OPIATES 300 QL URINE POC: NEGATIVE
OXYCODONE UR POC: NEGATIVE
PHENCYCLIDINE QL URINE POC: NEGATIVE
TRICYCLIC ANTIDEPRESSANTS (TCA) QL URINE POC: NEGATIVE

## 2021-09-16 PROCEDURE — G8536 NO DOC ELDER MAL SCRN: HCPCS | Performed by: FAMILY MEDICINE

## 2021-09-16 PROCEDURE — 80305 DRUG TEST PRSMV DIR OPT OBS: CPT | Performed by: FAMILY MEDICINE

## 2021-09-16 PROCEDURE — 1101F PT FALLS ASSESS-DOCD LE1/YR: CPT | Performed by: FAMILY MEDICINE

## 2021-09-16 PROCEDURE — 3017F COLORECTAL CA SCREEN DOC REV: CPT | Performed by: FAMILY MEDICINE

## 2021-09-16 PROCEDURE — G8753 SYS BP > OR = 140: HCPCS | Performed by: FAMILY MEDICINE

## 2021-09-16 PROCEDURE — G8400 PT W/DXA NO RESULTS DOC: HCPCS | Performed by: FAMILY MEDICINE

## 2021-09-16 PROCEDURE — 99214 OFFICE O/P EST MOD 30 MIN: CPT | Performed by: FAMILY MEDICINE

## 2021-09-16 PROCEDURE — G9717 DOC PT DX DEP/BP F/U NT REQ: HCPCS | Performed by: FAMILY MEDICINE

## 2021-09-16 PROCEDURE — G8417 CALC BMI ABV UP PARAM F/U: HCPCS | Performed by: FAMILY MEDICINE

## 2021-09-16 PROCEDURE — 1090F PRES/ABSN URINE INCON ASSESS: CPT | Performed by: FAMILY MEDICINE

## 2021-09-16 PROCEDURE — G8754 DIAS BP LESS 90: HCPCS | Performed by: FAMILY MEDICINE

## 2021-09-16 PROCEDURE — G8427 DOCREV CUR MEDS BY ELIG CLIN: HCPCS | Performed by: FAMILY MEDICINE

## 2021-09-16 PROCEDURE — G0008 ADMIN INFLUENZA VIRUS VAC: HCPCS | Performed by: FAMILY MEDICINE

## 2021-09-16 PROCEDURE — 90694 VACC AIIV4 NO PRSRV 0.5ML IM: CPT | Performed by: FAMILY MEDICINE

## 2021-09-16 RX ORDER — NITROGLYCERIN 0.4 MG/1
0.4 TABLET SUBLINGUAL AS NEEDED
Qty: 25 EACH | Refills: 0 | Status: SHIPPED | OUTPATIENT
Start: 2021-09-16

## 2021-09-16 RX ORDER — ATENOLOL 50 MG/1
50 TABLET ORAL DAILY
Qty: 90 TABLET | Refills: 0 | Status: SHIPPED | OUTPATIENT
Start: 2021-09-16 | End: 2021-11-22 | Stop reason: SDUPTHER

## 2021-09-16 NOTE — PROGRESS NOTES
1. Have you been to the ER, urgent care clinic since your last visit? Hospitalized since your last visit? No    2. Have you seen or consulted any other health care providers outside of the [address] [place] [address] since your last visit? Include any pap smears or colon screening. No    Reviewed record in preparation for visit and have necessary documentation  Pt did not bring medication to office visit for review  Patient is accompanied by self I have received verbal consent from Amy Claire to discuss any/all medical information while they are present in the room.     Goals that were addressed and/or need to be completed during or after this appointment include     Health Maintenance Due   Topic Date Due    COVID-19 Vaccine (1) Never done    DTaP/Tdap/Td series (1 - Tdap) Never done    Shingrix Vaccine Age 50> (1 of 2) Never done    Bone Densitometry (Dexa) Screening  Never done    Breast Cancer Screen Mammogram  05/20/2017    Flu Vaccine (1) 09/01/2021    Medicare Yearly Exam  09/12/2021

## 2021-09-17 ENCOUNTER — TELEPHONE (OUTPATIENT)
Dept: FAMILY MEDICINE CLINIC | Age: 71
End: 2021-09-17

## 2021-09-21 NOTE — PROGRESS NOTES
Chief Complaint   Patient presents with    Follow-up     needs xray     Medication Refill     she is a 73y.o. year old female who presents for follow up of her chronic medical conditions. Patient with hx of CAD, HTN, HLD, GERD, B12 deficiency, blindness, anxiety and depression. Patient denies HA, dizziness, SOB, CP, abdominal pain, dysuria. I once again explained to patient that she needs an encounter every 3 months for refills of lorazepam and should make that today before leaving clinic. Hypertension:  The patient reports: Has run out of medication, no medication side effects noted, no TIA's, no chest pain on exertion, no dyspnea on exertion, no swelling of ankles. Lifestyle modification/social history: sedentary     BP Readings from Last 3 Encounters:   09/16/21 (!) 157/76   06/08/21 120/76   03/12/20 136/74     Lab Results   Component Value Date/Time    Sodium 144 06/08/2021 02:45 PM    Potassium 4.3 06/08/2021 02:45 PM    Chloride 109 (H) 06/08/2021 02:45 PM    CO2 26 06/08/2021 02:45 PM    Anion gap 9 06/08/2021 02:45 PM    Glucose 92 06/08/2021 02:45 PM    BUN 12 06/08/2021 02:45 PM    Creatinine 0.82 06/08/2021 02:45 PM    BUN/Creatinine ratio 15 06/08/2021 02:45 PM    GFR est AA >60 06/08/2021 02:45 PM    GFR est non-AA >60 06/08/2021 02:45 PM    Calcium 9.2 06/08/2021 02:45 PM    Bilirubin, total 0.4 06/08/2021 02:45 PM    ALT (SGPT) 21 06/08/2021 02:45 PM    Alk. phosphatase 84 06/08/2021 02:45 PM    Protein, total 7.4 06/08/2021 02:45 PM    Albumin 3.8 06/08/2021 02:45 PM    Globulin 3.6 06/08/2021 02:45 PM    A-G Ratio 1.1 06/08/2021 02:45 PM          Patient advised to log blood pressures at home 3-5 times monthly and bring to next visit. Call office as soon as possible if BP's over 140/90 on multiple occasions or with symptoms of dizziness, chest pain, shortness of breath, headache or ankle swelling.   Our goal is to normalize the blood pressure to decrease the risks of strokes and heart attacks. The patient is in agreement with the plan. Hyperlipidemia    Cardiovascular risks for her are: Age, HTN, CAD    Lab Results   Component Value Date/Time    Cholesterol, total 176 06/08/2021 02:45 PM    HDL Cholesterol 54 06/08/2021 02:45 PM    LDL, calculated 103.4 (H) 06/08/2021 02:45 PM    Triglyceride 93 06/08/2021 02:45 PM    CHOL/HDL Ratio 3.3 06/08/2021 02:45 PM     Our goal is to lower hyperlipidemia to decrease the risks of strokes and heart attacks      Patient Active Problem List   Diagnosis Code    Anxiety F41.9    Hyperlipidemia E78.5    CAD (coronary artery disease) I25.10    Blindness H54.7    HTN (hypertension) I10    Osteopenia M85.80    B12 deficiency E53.8    Insomnia G47.00    Allergic rhinitis J30.9    Gastroesophageal reflux disease without esophagitis K21.9    Moderate episode of recurrent major depressive disorder ([facility] [address] F33.1     Past Surgical History:   Procedure Laterality Date    HX ORTHOPAEDIC      ND CABG, VEIN, FIVE  2006    ND CARDIAC SURG PROCEDURE UNLIST       Social History     Socioeconomic History    Marital status:      Spouse name: Not on file    Number of children: Not on file    Years of education: Not on file    Highest education level: Not on file   Occupational History    Not on file   Tobacco Use    Smoking status: Never Smoker    Smokeless tobacco: Never Used   Substance and Sexual Activity    Alcohol use: Yes     Comment: social    Drug use: No    Sexual activity: Yes     Partners: Male     Birth control/protection: None   Other Topics Concern    Not on file   Social History Narrative    Not on file     Social Determinants of Health     Financial Resource Strain:     Difficulty of Paying Living Expenses:    Food Insecurity:     Worried About Running Out of Food in the Last Year:     Ran Out of Food in the Last Year:    Transportation Needs:     Lack of Transportation (Medical):      Lack of Transportation (Non-Medical): Physical Activity:     Days of Exercise per Week:     Minutes of Exercise per Session:    Stress:     Feeling of Stress :    Social Connections:     Frequency of Communication with Friends and Family:     Frequency of Social Gatherings with Friends and Family:     Attends Yazidism Services:     Active Member of Clubs or Organizations:     Attends Club or Organization Meetings:     Marital Status:    Intimate Partner Violence:     Fear of Current or Ex-Partner:     Emotionally Abused:     Physically Abused:     Sexually Abused:      Family History   Problem Relation Age of Onset    No Known Problems Mother     No Known Problems Father      Current Outpatient Medications   Medication Sig    atenoloL (TENORMIN) 50 mg tablet Take 1 Tablet by mouth daily.  nitroglycerin (NITROSTAT) 0.4 mg SL tablet 1 Tablet by SubLINGual route as needed for Chest Pain.  LORazepam (ATIVAN) 1 mg tablet TAKE ONE TABLET BY MOUTH EVERY 8-12 HOURS AS NEEDED FOR ANXIETY. MAX DAILY AMOUNT: 3MG    simvastatin (ZOCOR) 40 mg tablet Take 1 Tablet by mouth nightly.  clopidogreL (PLAVIX) 75 mg tab TAKE ONE TABLET BY MOUTH ONCE A DAY    esomeprazole (NEXIUM) 40 mg capsule TAKE ONE CAPSULE BY MOUTH EVERY DAY    gabapentin (NEURONTIN) 300 mg capsule TAKE ONE CAPSULE BY MOUTH 3 TIMES A DAY AS NEEDED FOR PAIN  Indications: \"change of life\" signs    albuterol (PROVENTIL HFA, VENTOLIN HFA, PROAIR HFA) 90 mcg/actuation inhaler Take 2 Puffs by inhalation every four (4) hours as needed for Wheezing.  fluticasone propionate (FLONASE) 50 mcg/actuation nasal spray USE 2 SPRAYS IN BOTH NOSTRILS DAILY    aspirin 81 mg tablet Take 81 mg by mouth. No current facility-administered medications for this visit.      Allergies   Allergen Reactions    Percocet [Oxycodone-Acetaminophen] Itching       Review of Systems:  Constitutional: Feeling well, denies fatigue, malaise  Skin: Negative for rash or lesion  HEENT: see HPI, Negative for acute hearing changes  Respiratory: Negative for cough, wheezing or SOB  Cardiovascular: Negative for chest pain, dizziness or palpitations  Gastrointestinal: Negative for nausea or abdominal pain  Genital/urinary: Negative for dysuria or voiding dysfunction  Musculoskeletal: Negative for acute myalgia or arthralgia  Neurological: Negative for HA, weakness or paresthesia  Psychlogical: Negative for depression or anxiety     Vitals:    09/16/21 1104   BP: (!) 157/76   Pulse: (!) 55   Resp: 12   Temp: 98.2 °F (36.8 °C)   SpO2: 98%   Weight: 176 lb 3.2 oz (79.9 kg)   Height: 5' 1\" (1.549 m)       Physical Examination:  General: Well developed, well nourished, in no acute distress  Skin: Warm and dry, no rash or lesion appreciated  Head: Normocephalic, atraumatic  Neck: Normal range of motion  Respiratory: Clear to auscultation bilaterally with symmetrical effort  Cardiovascular: Normal S1, S2, Regular rate and rhythm  Extremities: FROM, normal gait  Neurological: blind, Normal strength and sensation. Psychological: Active, alert and oriented. Affect appropriate       Diagnoses and all orders for this visit:    1. Anxiety  -     AMB POC USCREEN 12 DRUG     2. Coronary artery disease due to lipid rich plaque  -     atenoloL (TENORMIN) 50 mg tablet; Take 1 Tablet by mouth daily. -     nitroglycerin (NITROSTAT) 0.4 mg SL tablet; 1 Tablet by SubLINGual route as needed for Chest Pain. 3. Essential hypertension  -     atenoloL (TENORMIN) 50 mg tablet; Take 1 Tablet by mouth daily. 4. Encounter for immunization  -     FLU ([facility])  -     [id number] of care:  Diagnoses were discussed in detail with patient. Medication risks/benefits/side effects discussed with patient. All of the patient's questions were addressed and answered to apparent satisfaction. The patient understands and agrees with our plan of care.   The patient knows to call back if they have questions about the plan of care or if symptoms change. The patient received an After-Visit Summary which contains VS, diagnoses, orders, allergy and medication lists. Future Appointments   Date Time Provider Susan Gomez   10/1/2021 10:40 AM MD LATIA Gomez BS AMB   11/30/2021 10:00 AM MD LATIA Gomez BS AMB         Follow-up and Dispositions    · Return in about 2 months (around 11/30/2021).

## 2021-10-01 ENCOUNTER — OFFICE VISIT (OUTPATIENT)
Dept: FAMILY MEDICINE CLINIC | Age: 71
End: 2021-10-01
Payer: MEDICARE

## 2021-10-01 VITALS
RESPIRATION RATE: 18 BRPM | TEMPERATURE: 97 F | SYSTOLIC BLOOD PRESSURE: 180 MMHG | DIASTOLIC BLOOD PRESSURE: 69 MMHG | OXYGEN SATURATION: 99 % | WEIGHT: 176 LBS | BODY MASS INDEX: 33.23 KG/M2 | HEART RATE: 56 BPM | HEIGHT: 61 IN

## 2021-10-01 DIAGNOSIS — F41.9 ANXIETY: Primary | ICD-10-CM

## 2021-10-01 DIAGNOSIS — I25.10 CORONARY ARTERY DISEASE DUE TO LIPID RICH PLAQUE: ICD-10-CM

## 2021-10-01 DIAGNOSIS — F13.20 BENZODIAZEPINE DEPENDENCE (HCC): ICD-10-CM

## 2021-10-01 DIAGNOSIS — F33.1 MODERATE EPISODE OF RECURRENT MAJOR DEPRESSIVE DISORDER (HCC): ICD-10-CM

## 2021-10-01 DIAGNOSIS — I25.83 CORONARY ARTERY DISEASE DUE TO LIPID RICH PLAQUE: ICD-10-CM

## 2021-10-01 DIAGNOSIS — G89.4 CHRONIC PAIN SYNDROME: ICD-10-CM

## 2021-10-01 PROCEDURE — 3017F COLORECTAL CA SCREEN DOC REV: CPT | Performed by: FAMILY MEDICINE

## 2021-10-01 PROCEDURE — G8753 SYS BP > OR = 140: HCPCS | Performed by: FAMILY MEDICINE

## 2021-10-01 PROCEDURE — G8400 PT W/DXA NO RESULTS DOC: HCPCS | Performed by: FAMILY MEDICINE

## 2021-10-01 PROCEDURE — G8754 DIAS BP LESS 90: HCPCS | Performed by: FAMILY MEDICINE

## 2021-10-01 PROCEDURE — G9717 DOC PT DX DEP/BP F/U NT REQ: HCPCS | Performed by: FAMILY MEDICINE

## 2021-10-01 PROCEDURE — G8417 CALC BMI ABV UP PARAM F/U: HCPCS | Performed by: FAMILY MEDICINE

## 2021-10-01 PROCEDURE — 1090F PRES/ABSN URINE INCON ASSESS: CPT | Performed by: FAMILY MEDICINE

## 2021-10-01 PROCEDURE — 99214 OFFICE O/P EST MOD 30 MIN: CPT | Performed by: FAMILY MEDICINE

## 2021-10-01 PROCEDURE — 1101F PT FALLS ASSESS-DOCD LE1/YR: CPT | Performed by: FAMILY MEDICINE

## 2021-10-01 PROCEDURE — G8427 DOCREV CUR MEDS BY ELIG CLIN: HCPCS | Performed by: FAMILY MEDICINE

## 2021-10-01 PROCEDURE — G8536 NO DOC ELDER MAL SCRN: HCPCS | Performed by: FAMILY MEDICINE

## 2021-10-01 NOTE — PROGRESS NOTES
Chief Complaint   Patient presents with    Medication Refill     Visit Vitals  BP (!) 172/58 (BP 1 Location: Right arm)   Pulse (!) 56   Temp 97 °F (36.1 °C) (Temporal)   Resp 18   Ht 5' 1\" (1.549 m)   Wt 176 lb (79.8 kg)   SpO2 99%   BMI 33.25 kg/m²     1. Have you been to the ER, urgent care clinic since your last visit? Hospitalized since your last visit? No    2. Have you seen or consulted any other health care providers outside of the [address] [place] [address] since your last visit? Include any pap smears or colon screening.  No    Reviewed record in preparation for visit and have necessary documentation  Pt did not bring medication to office visit for review  opportunity was given for questions  Goals that were addressed and/or need to be completed during or after this appointment include   Health Maintenance Due   Topic Date Due    COVID-19 Vaccine (1) Never done    DTaP/Tdap/Td series (1 - Tdap) Never done    Shingrix Vaccine Age 50> (1 of 2) Never done    Bone Densitometry (Dexa) Screening  Never done    Breast Cancer Screen Mammogram  05/20/2017    Medicare Yearly Exam  09/12/2021

## 2021-10-01 NOTE — PROGRESS NOTES
[facility]    History of Present Illness:   Sofia Gutierrez is a 73 y.o. female with history of CAD, HTN, GERD, Anxiety, Blindness, Major Depression/Anxiety  CC: Anxiety/Depression  History provided by patient and Records    HPI:  Anxiety/Depression:  Patient reports that she is taking Lorazepam BID and Gabapentin 1-2 times daily. Patient requeststhat she would like to change providers for Controlled substances. Noting POC urine drug test was negative for any controlled substance. Chronic Pain:  Patient Takes Gabapentin 1-2 times daily. For Knee Pain. Blindness: Stable. CAD: On Plavix daily     Health Maintenance  Health Maintenance Due   Topic Date Due    COVID-19 Vaccine (1) Never done    DTaP/Tdap/Td series (1 - Tdap) Never done    Shingrix Vaccine Age 50> (1 of 2) Never done    Bone Densitometry (Dexa) Screening  Never done    Breast Cancer Screen Mammogram  05/20/2017    Medicare Yearly Exam  09/12/2021       Past Medical, Family, and Social History:     Current Outpatient Medications on File Prior to Visit   Medication Sig Dispense Refill    atenoloL (TENORMIN) 50 mg tablet Take 1 Tablet by mouth daily. 90 Tablet 0    nitroglycerin (NITROSTAT) 0.4 mg SL tablet 1 Tablet by SubLINGual route as needed for Chest Pain. 25 Each 0    LORazepam (ATIVAN) 1 mg tablet TAKE ONE TABLET BY MOUTH EVERY 8-12 HOURS AS NEEDED FOR ANXIETY. MAX DAILY AMOUNT: 3MG 60 Tablet 0    simvastatin (ZOCOR) 40 mg tablet Take 1 Tablet by mouth nightly.  90 Tablet 0    clopidogreL (PLAVIX) 75 mg tab TAKE ONE TABLET BY MOUTH ONCE A DAY 90 Tablet 1    esomeprazole (NEXIUM) 40 mg capsule TAKE ONE CAPSULE BY MOUTH EVERY DAY 90 Capsule 1    gabapentin (NEURONTIN) 300 mg capsule TAKE ONE CAPSULE BY MOUTH 3 TIMES A DAY AS NEEDED FOR PAIN  Indications: \"change of life\" signs 90 Capsule 0    albuterol (PROVENTIL HFA, VENTOLIN HFA, PROAIR HFA) 90 mcg/actuation inhaler Take 2 Puffs by inhalation every four (4) hours as needed for Wheezing. 1 Inhaler 1    fluticasone propionate (FLONASE) 50 mcg/actuation nasal spray USE 2 SPRAYS IN BOTH NOSTRILS DAILY 16 g 2    aspirin 81 mg tablet Take 81 mg by mouth. No current facility-administered medications on file prior to visit. Patient Active Problem List   Diagnosis Code    Anxiety F41.9    Hyperlipidemia E78.5    CAD (coronary artery disease) I25.10    Blindness H54.7    HTN (hypertension) I10    Osteopenia M85.80    B12 deficiency E53.8    Insomnia G47.00    Allergic rhinitis J30.9    Gastroesophageal reflux disease without esophagitis K21.9    Moderate episode of recurrent major depressive disorder (HCC) F33.1       Social History     Socioeconomic History    Marital status:      Spouse name: Not on file    Number of children: Not on file    Years of education: Not on file    Highest education level: Not on file   Occupational History    Not on file   Tobacco Use    Smoking status: Never Smoker    Smokeless tobacco: Never Used   Substance and Sexual Activity    Alcohol use: Yes     Comment: social    Drug use: No    Sexual activity: Yes     Partners: Male     Birth control/protection: None   Other Topics Concern    Not on file   Social History Narrative    Not on file     Social Determinants of Health     Financial Resource Strain:     Difficulty of Paying Living Expenses:    Food Insecurity:     Worried About Running Out of Food in the Last Year:     Ran Out of Food in the Last Year:    Transportation Needs:     Lack of Transportation (Medical):      Lack of Transportation (Non-Medical):    Physical Activity:     Days of Exercise per Week:     Minutes of Exercise per Session:    Stress:     Feeling of Stress :    Social Connections:     Frequency of Communication with Friends and Family:     Frequency of Social Gatherings with Friends and Family:     Attends Rastafarian Services:     Active Member of Clubs or Organizations:     Attends Club or Organization Meetings:     Marital Status:    Intimate Partner Violence:     Fear of Current or Ex-Partner:     Emotionally Abused:     Physically Abused:     Sexually Abused:        Review of Systems   Review of Systems   Constitutional: Negative for chills and fever. HENT: Negative for congestion. Cardiovascular: Negative for chest pain and palpitations. Gastrointestinal: Negative for nausea and vomiting. Neurological: Negative for dizziness and headaches. Objective:     Visit Vitals  BP (!) 172/58 (BP 1 Location: Right arm)   Pulse (!) 56   Temp 97 °F (36.1 °C) (Temporal)   Resp 18   Ht 5' 1\" (1.549 m)   Wt 176 lb (79.8 kg)   SpO2 99%   BMI 33.25 kg/m²        Physical Exam  Vitals and nursing note reviewed. Constitutional:       Appearance: Normal appearance. HENT:      Head: Normocephalic and atraumatic. Cardiovascular:      Rate and Rhythm: Normal rate and regular rhythm. Pulses: Normal pulses. Heart sounds: Normal heart sounds. No murmur heard. No friction rub. No gallop. Pulmonary:      Effort: Pulmonary effort is normal.      Breath sounds: Normal breath sounds. Abdominal:      General: Abdomen is flat. Bowel sounds are normal.      Palpations: Abdomen is soft. Musculoskeletal:         General: Normal range of motion. Cervical back: Normal range of motion and neck supple. Skin:     General: Skin is warm and dry. Neurological:      General: No focal deficit present. Mental Status: She is alert and oriented to person, place, and time. Pertinent Labs/Studies:      Assessment and orders:   Diagnoses and all orders for this visit:    1. Anxiety: Negative POC test, will get Send out though as possible error.  -     DRUG SCREEN, URINE; Future  -     COMPLIANCE DRUG SCREEN/PRESCRIPTION MONITORING; Future    2. Chronic pain syndrome: Gabapentin  -     DRUG SCREEN, URINE;  Future  -     COMPLIANCE DRUG SCREEN/PRESCRIPTION MONITORING; Future    3. Benzodiazepine dependence ([facility] [address]): Waiting for Benzo.  -     DRUG SCREEN, URINE; Future  -     COMPLIANCE DRUG SCREEN/PRESCRIPTION MONITORING; Future    4. Moderate episode of recurrent major depressive disorder ([facility] [address]    5. Coronary artery disease due to lipid rich plaque      Follow-up and Dispositions    · Return in about 3 months (around 1/1/2022). I have discussed the diagnosis with the patient and the intended plan as seen in the above orders. Social history, medical history, and labs were reviewed. The patient has received an after-visit summary and questions were answered concerning future plans. I have discussed medication side effects and warnings with the patient as well.     MD LINA Parkinson & AVELINA REDMAN [facility]  10/01/21

## 2021-10-02 LAB
AMPHET UR QL SCN: NEGATIVE
BARBITURATES UR QL SCN: NEGATIVE
BENZODIAZ UR QL: NEGATIVE
CANNABINOIDS UR QL SCN: NEGATIVE
COCAINE UR QL SCN: NEGATIVE
DRUG SCRN COMMENT,DRGCM: NORMAL
METHADONE UR QL: NEGATIVE
OPIATES UR QL: NEGATIVE
PCP UR QL: NEGATIVE

## 2021-10-07 LAB — DRUGS UR: NORMAL

## 2021-11-08 DIAGNOSIS — K21.9 GASTROESOPHAGEAL REFLUX DISEASE WITHOUT ESOPHAGITIS: ICD-10-CM

## 2021-11-08 RX ORDER — ESOMEPRAZOLE MAGNESIUM 40 MG/1
40 CAPSULE, DELAYED RELEASE ORAL DAILY
Qty: 90 CAPSULE | Refills: 1 | Status: SHIPPED | OUTPATIENT
Start: 2021-11-08 | End: 2022-02-07 | Stop reason: SDUPTHER

## 2021-11-08 RX ORDER — ESOMEPRAZOLE MAGNESIUM 40 MG/1
CAPSULE, DELAYED RELEASE ORAL
Qty: 90 CAPSULE | Refills: 0 | OUTPATIENT
Start: 2021-11-08

## 2021-11-22 ENCOUNTER — OFFICE VISIT (OUTPATIENT)
Dept: FAMILY MEDICINE CLINIC | Age: 71
End: 2021-11-22
Payer: MEDICARE

## 2021-11-22 ENCOUNTER — TELEPHONE (OUTPATIENT)
Dept: FAMILY MEDICINE CLINIC | Age: 71
End: 2021-11-22

## 2021-11-22 VITALS
DIASTOLIC BLOOD PRESSURE: 65 MMHG | RESPIRATION RATE: 16 BRPM | OXYGEN SATURATION: 98 % | HEART RATE: 52 BPM | TEMPERATURE: 97.3 F | WEIGHT: 176 LBS | SYSTOLIC BLOOD PRESSURE: 143 MMHG | BODY MASS INDEX: 33.23 KG/M2 | HEIGHT: 61 IN

## 2021-11-22 DIAGNOSIS — I25.83 CORONARY ARTERY DISEASE DUE TO LIPID RICH PLAQUE: ICD-10-CM

## 2021-11-22 DIAGNOSIS — M79.89 LEG SWELLING: Primary | ICD-10-CM

## 2021-11-22 DIAGNOSIS — E78.5 HYPERLIPIDEMIA, UNSPECIFIED HYPERLIPIDEMIA TYPE: ICD-10-CM

## 2021-11-22 DIAGNOSIS — Z00.00 MEDICARE ANNUAL WELLNESS VISIT, SUBSEQUENT: ICD-10-CM

## 2021-11-22 DIAGNOSIS — I25.10 CORONARY ARTERY DISEASE DUE TO LIPID RICH PLAQUE: ICD-10-CM

## 2021-11-22 PROCEDURE — G0439 PPPS, SUBSEQ VISIT: HCPCS | Performed by: FAMILY MEDICINE

## 2021-11-22 PROCEDURE — 3017F COLORECTAL CA SCREEN DOC REV: CPT | Performed by: FAMILY MEDICINE

## 2021-11-22 PROCEDURE — G9717 DOC PT DX DEP/BP F/U NT REQ: HCPCS | Performed by: FAMILY MEDICINE

## 2021-11-22 PROCEDURE — 1101F PT FALLS ASSESS-DOCD LE1/YR: CPT | Performed by: FAMILY MEDICINE

## 2021-11-22 PROCEDURE — G8400 PT W/DXA NO RESULTS DOC: HCPCS | Performed by: FAMILY MEDICINE

## 2021-11-22 PROCEDURE — G8427 DOCREV CUR MEDS BY ELIG CLIN: HCPCS | Performed by: FAMILY MEDICINE

## 2021-11-22 PROCEDURE — G8754 DIAS BP LESS 90: HCPCS | Performed by: FAMILY MEDICINE

## 2021-11-22 PROCEDURE — G8536 NO DOC ELDER MAL SCRN: HCPCS | Performed by: FAMILY MEDICINE

## 2021-11-22 PROCEDURE — G8753 SYS BP > OR = 140: HCPCS | Performed by: FAMILY MEDICINE

## 2021-11-22 PROCEDURE — G8417 CALC BMI ABV UP PARAM F/U: HCPCS | Performed by: FAMILY MEDICINE

## 2021-11-22 RX ORDER — SIMVASTATIN 40 MG/1
40 TABLET, FILM COATED ORAL
Qty: 90 TABLET | Refills: 1 | Status: SHIPPED | OUTPATIENT
Start: 2021-11-22 | End: 2022-03-21 | Stop reason: SDUPTHER

## 2021-11-22 RX ORDER — ATENOLOL 50 MG/1
50 TABLET ORAL DAILY
Qty: 90 TABLET | Refills: 1 | Status: SHIPPED | OUTPATIENT
Start: 2021-11-22 | End: 2022-03-14 | Stop reason: SDUPTHER

## 2021-11-22 RX ORDER — FUROSEMIDE 20 MG/1
TABLET ORAL
Qty: 60 TABLET | Refills: 1 | Status: SHIPPED | OUTPATIENT
Start: 2021-11-22 | End: 2022-05-09 | Stop reason: SDUPTHER

## 2021-11-22 NOTE — PROGRESS NOTES
This is the Subsequent Medicare Annual Wellness Exam, performed 12 months or more after the Initial AWV or the last Subsequent AWV    I have reviewed the patient's medical history in detail and updated the computerized patient record. History     Well Woman exam:  Mike Nguyen is a 73 y.o. female presenting for well woman exam.     How would you rate your health in generally over the last year? Good  Has your physical and emotional health limited your social activities with family or friends? yes    Current Complaints? Left ear pain (See attached Problem visit note if applicable)    Menstrual/Sexual History:  Age at which menses began: 17 y.o. Vaginal Bleeding: None    PAP History: History of cervical Dysplasia, cleared. [id number]  Sexually active: No    Risk factors for breast cancer: None    Diet/Exercise History:  Diet: Favorite food [employer]ocon[employer]. - Does patient eat at least 5 servings of Fruits/vegetables daily? No   - Is patient currently dieting? No    Exercise: Patient does not have structured exercise  - Does patient get 150 minutes of structured exercise weekly? No  - Type of work patient has? retired  - Limitations to exercise? Blind    Healthcare maintenance:   Health Maintenance Due   Topic Date Due    COVID-19 Vaccine (1) Never done    DTaP/Tdap/Td series (1 - Tdap) Never done    Shingrix Vaccine Age 50> (1 of 2) Never done    Bone Densitometry (Dexa) Screening  Never done    Breast Cancer Screen Mammogram  05/20/2017    Medicare Yearly Exam  09/12/2021    Pneumococcal 65+ years (2 of 2 - PPSV23) 10/18/2021     Mammogram indicated? Yes  Colonoscopy indicated? No   DEXA scan indicated? Yes  HIV/STI testing indicated? No   Hepatitis C testing indicated? No   Lung cancer screening indicated? No   AAA screening indicated?   No     Immunization History   Administered Date(s) Administered    Influenza High Dose Vaccine PF 10/18/2017, 10/10/2019    Influenza Vaccine 10/16/2013    Influenza Vaccine (Tri) Adjuvanted (>65 Yrs FLUAD TRI 36874) 09/18/2018    Influenza Vaccine Split 10/04/2012    Influenza, Quadrivalent, Adjuvanted (>65 Yrs FLUAD QUAD 44959) 10/21/2020, 09/16/2021    Pneumococcal Conjugate (PCV-13) 01/29/2019    Pneumococcal Polysaccharide (PPSV-23) 10/18/2016    Zoster Vaccine, Live 08/29/2017     Flu indicated? No   Tdap indicated? No   Pneumovax indicated? Yes  Zostervax indicated? Yes  Meningococcal indicated? No      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- [place]- has GRIFFIN      Past Surgical History:   Procedure Laterality Date    HX ORTHOPAEDIC      CO CABG, VEIN, FIVE  2006    CO CARDIAC SURG PROCEDURE UNLIST       Allergies   Allergen Reactions    Percocet [Oxycodone-Acetaminophen] Itching     Family History   Problem Relation Age of Onset    No Known Problems Mother     No Known Problems Father      Social History     Tobacco Use    Smoking status: Never Smoker    Smokeless tobacco: Never Used   Substance Use Topics    Alcohol use: Yes     Comment: social     Depression Risk Factor Screening:     3 most recent PHQ Screens 10/1/2021   PHQ Not Done -   Little interest or pleasure in doing things Several days   Feeling down, depressed, irritable, or hopeless Not at all   Total Score PHQ 2 1     Alcohol Risk Factor Screening:    Do you average more than 1 drink per night or more than 7 drinks a week: No    In the past three months have you have had more than 4 drinks containing alcohol on one occasion: No  Functional Ability and Level of Safety:   Hearing Loss  Hearing is good. Activities of Daily Living  The home contains: no safety equipment. Patient needs help with:  transportation and shopping  No flowsheet data found.     Ambulation: Blind    Fall Risk  Fall Risk Assessment, last 12 mths 9/16/2021   Able to walk? Yes   Fall in past 12 months? 1   Do you feel unsteady? 1   Are you worried about falling 1   Is the gait abnormal? 0   Number of falls in past 12 months 1   Fall with injury? 1       Abuse Screen  Abuse Screening Questionnaire 11/22/2021   Do you ever feel afraid of your partner? N   Are you in a relationship with someone who physically or mentally threatens you? N   Is it safe for you to go home? Y     Cognitive Screening   Evaluation of Cognitive Function:  Has your family/caregiver stated any concerns about your memory: no  Normal  No flowsheet data found. Patient Care Team   Patient Care Team:  Kashmir Salinas MD as PCP - General (Family Medicine)  Kashmir Salinas MD as PCP - [facility]  Kira Vásquez MD (Cardiology)  Coreen Stuart MD (Orthopedic Surgery)  Assessment/Plan   Education and counseling provided:  Are appropriate based on today's review and evaluation    Diagnoses and all orders for this visit:    Medicare Wellness: Doing    1. Leg swelling: Needs Refill  -     furosemide (LASIX) 20 mg tablet; Take 1 tablet daily as needed for swelling    2. Coronary artery disease due to lipid rich plaque: Needs refill  -     atenoloL (TENORMIN) 50 mg tablet; Take 1 Tablet by mouth daily. -     simvastatin (ZOCOR) 40 mg tablet; Take 1 Tablet by mouth nightly. 3. Hyperlipidemia, unspecified hyperlipidemia type  -     simvastatin (ZOCOR) 40 mg tablet; Take 1 Tablet by mouth nightly.         Health Maintenance Topics with due status: Overdue       Topic Date Due    COVID-19 Vaccine Never done    DTaP/Tdap/Td series Never done    Shingrix Vaccine Age 50> Never done    Bone Densitometry (Dexa) Screening Never done    Breast Cancer Screen Mammogram 05/20/2017    Medicare Yearly Exam 09/12/2021    Pneumococcal 65+ years 10/18/2021     Health Maintenance Topics with due status: Not Due       Topic Last Completion Date    Lipid Screen 06/08/2021 Health Maintenance Topics with due status: Completed       Topic Last Completion Date    Hepatitis C Screening 07/15/2015    Flu Vaccine 09/16/2021

## 2021-11-22 NOTE — PROGRESS NOTES
1. Have you been to the ER, urgent care clinic since your last visit? Hospitalized since your last visit? No    2. Have you seen or consulted any other health care providers outside of the [address] [place] [address] since your last visit? Include any pap smears or colon screening. No    Reviewed record in preparation for visit and have necessary documentation  Goals that were addressed and/or need to be completed during or after this appointment include     Health Maintenance Due   Topic Date Due    COVID-19 Vaccine (1) Never done    DTaP/Tdap/Td series (1 - Tdap) Never done    Shingrix Vaccine Age 50> (1 of 2) Never done    Bone Densitometry (Dexa) Screening  Never done    Breast Cancer Screen Mammogram  05/20/2017    Medicare Yearly Exam  09/12/2021    Pneumococcal 65+ years (2 of 2 - PPSV23) 10/18/2021       Patient is accompanied by self I have received verbal consent from Amy Claire to discuss any/all medical information while they are present in the room.

## 2021-11-22 NOTE — PATIENT INSTRUCTIONS
Medicare Wellness Visit, Female     The best way to live healthy is to have a lifestyle where you eat a well-balanced diet, exercise regularly, limit alcohol use, and quit all forms of tobacco/nicotine, if applicable. Regular preventive services are another way to keep healthy. Preventive services (vaccines, screening tests, monitoring & exams) can help personalize your care plan, which helps you manage your own care. Screening tests can find health problems at the earliest stages, when they are easiest to treat. [facility] follows the current, evidence-based guidelines published by the [facility] ([facility]) when recommending preventive services for our patients. Because we follow these guidelines, sometimes recommendations change over time as research supports it. (For example, mammograms used to be recommended annually. Even though Medicare will still pay for an annual mammogram, the newer guidelines recommend a mammogram every two years for women of average risk). Of course, you and your doctor may decide to screen more often for some diseases, based on your risk and your co-morbidities (chronic disease you are already diagnosed with). Preventive services for you include:  - Medicare offers their members a free annual wellness visit, which is time for you and your primary care provider to discuss and plan for your preventive service needs. Take advantage of this benefit every year!  -All adults over the age of 72 should receive the recommended pneumonia vaccines. Current USPSTF guidelines recommend a series of two vaccines for the best pneumonia protection.   -All adults should have a flu vaccine yearly and a tetanus vaccine every 10 years.   -All adults age 48 and older should receive the shingles vaccines (series of two vaccines).       -All adults age 38-68 who are overweight should have a diabetes screening test once every three years.   -All adults born between [birth year] and 1965 should be screened once for Hepatitis C.  -Other screening tests and preventive services for persons with diabetes include: an eye exam to screen for diabetic retinopathy, a kidney function test, a foot exam, and stricter control over your cholesterol.   -Cardiovascular screening for adults with routine risk involves an electrocardiogram (ECG) at intervals determined by your doctor.   -Colorectal cancer screenings should be done for adults age 54-65 with no increased risk factors for colorectal cancer. There are a number of acceptable methods of screening for this type of cancer. Each test has its own benefits and drawbacks. Discuss with your doctor what is most appropriate for you during your annual wellness visit. The different tests include: colonoscopy (considered the best screening method), a fecal occult blood test, a fecal DNA test, and sigmoidoscopy.    -A bone mass density test is recommended when a woman turns 65 to screen for osteoporosis. This test is only recommended one time, as a screening. Some providers will use this same test as a disease monitoring tool if you already have osteoporosis. -Breast cancer screenings are recommended every other year for women of normal risk, age 54-69.  -Cervical cancer screenings for women over age 72 are only recommended with certain risk factors.      Here is a list of your current Health Maintenance items (your personalized list of preventive services) with a due date:  Health Maintenance Due   Topic Date Due    COVID-19 Vaccine (1) Never done    DTaP/Tdap/Td  (1 - Tdap) Never done    Shingles Vaccine (1 of 2) Never done    Bone Mineral Density   Never done    Mammogram  05/20/2017    Pneumococcal Vaccine (2 of 2 - PPSV23) 10/18/2021

## 2021-11-22 NOTE — TELEPHONE ENCOUNTER
----- Message from Zaid Han sent at 11/22/2021  2:26 PM EST -----  Subject: Message to Provider    QUESTIONS  Information for Provider? Patient called in she would like to know if she   can stop in and get the pneumonia shot she forgot to get it today please   call   ---------------------------------------------------------------------------  --------------  [address]  What is the best way for the office to contact you? OK to leave message on   voicemail  Preferred Call Back Phone Number? [phone]  ---------------------------------------------------------------------------  --------------  SCRIPT ANSWERS  Relationship to Patient?  Self

## 2021-11-23 NOTE — TELEPHONE ENCOUNTER
Called patient. She was asked per Dr Roseanna Leonard \"if she would like to go to the pharmacy to receive her Pneumonia vaccine or she can be double booked to receive it here at the office? \" Patient stated she would call her  and then call the office back.

## 2022-01-10 DIAGNOSIS — M25.561 RIGHT KNEE PAIN, UNSPECIFIED CHRONICITY: ICD-10-CM

## 2022-01-10 RX ORDER — GABAPENTIN 300 MG/1
CAPSULE ORAL
Qty: 90 CAPSULE | Refills: 1 | Status: SHIPPED | OUTPATIENT
Start: 2022-01-10 | End: 2022-04-12 | Stop reason: SDUPTHER

## 2022-01-20 DIAGNOSIS — I25.10 CORONARY ARTERY DISEASE DUE TO LIPID RICH PLAQUE: ICD-10-CM

## 2022-01-20 DIAGNOSIS — I25.83 CORONARY ARTERY DISEASE DUE TO LIPID RICH PLAQUE: ICD-10-CM

## 2022-01-20 RX ORDER — CLOPIDOGREL BISULFATE 75 MG/1
TABLET ORAL
Qty: 90 TABLET | Refills: 1 | Status: SHIPPED | OUTPATIENT
Start: 2022-01-20 | End: 2022-04-25 | Stop reason: SDUPTHER

## 2022-01-26 ENCOUNTER — TELEPHONE (OUTPATIENT)
Dept: FAMILY MEDICINE CLINIC | Age: 72
End: 2022-01-26

## 2022-01-26 NOTE — TELEPHONE ENCOUNTER
----- Message from Rene Rosales sent at 1/26/2022  7:25 AM EST -----  Subject: Message to Provider    QUESTIONS  Information for Provider? Pt called in with concerns for covid, she's   blind and can't take tests per pt. She stated that her symptoms were   throat itching coughing, mucus, can't hold urine, no sleep, and mucus is   moving to her chest. She would like medication called into her pharmacy.   ---------------------------------------------------------------------------  --------------  [address]  What is the best way for the office to contact you? OK to leave message on   voicemail  Preferred Call Back Phone Number? [phone]  ---------------------------------------------------------------------------  --------------  SCRIPT ANSWERS  Relationship to Patient?  Self

## 2022-01-27 ENCOUNTER — VIRTUAL VISIT (OUTPATIENT)
Dept: FAMILY MEDICINE CLINIC | Age: 72
End: 2022-01-27
Payer: MEDICARE

## 2022-01-27 DIAGNOSIS — J01.90 ACUTE BACTERIAL RHINOSINUSITIS: Primary | ICD-10-CM

## 2022-01-27 DIAGNOSIS — B96.89 ACUTE BACTERIAL RHINOSINUSITIS: Primary | ICD-10-CM

## 2022-01-27 PROCEDURE — 99442 PR PHYS/QHP TELEPHONE EVALUATION 11-20 MIN: CPT | Performed by: STUDENT IN AN ORGANIZED HEALTH CARE EDUCATION/TRAINING PROGRAM

## 2022-01-27 RX ORDER — ALBUTEROL SULFATE 90 UG/1
2 AEROSOL, METERED RESPIRATORY (INHALATION)
Qty: 1 EACH | Refills: 3 | Status: SHIPPED | OUTPATIENT
Start: 2022-01-27 | End: 2022-08-04 | Stop reason: SDUPTHER

## 2022-01-27 RX ORDER — AMOXICILLIN AND CLAVULANATE POTASSIUM 875; 125 MG/1; MG/1
1 TABLET, FILM COATED ORAL EVERY 12 HOURS
Qty: 14 TABLET | Refills: 0 | Status: SHIPPED | OUTPATIENT
Start: 2022-01-27 | End: 2022-02-03

## 2022-01-27 NOTE — PROGRESS NOTES
Naomy Skinner  73 y.o. female  1950  [address] [facility]  [id number]    [phone] (home)      [address]:    Telephone Encounter  Gary PAREDES       Encounter Date: 1/27/2022    Consent:  She and/or the health care decision maker is aware that that she may receive a bill for this telephone service, depending on her insurance coverage, and has provided verbal consent to proceed: Yes    Chief Complaint   Patient presents with    Sinus Pain     x3 dailys        History of Present Illness   Chyna Lerner is a 73 y.o. female was evaluated by telephone. Patient with pmhx CAD and CBG, HTN, GERD, blindness who calls complaining of 3 days of sore throat, nasal congestion, ear pain, cough productive of yellow mucous, and chills, and facial pain and pressure. No chest pains, sob, n/v/d. No sick contacts. Booster shot as well. Flonase not helping well. Taking vitamins, keeping hydrated. No taking cold medicines. Review of Systems   See HPI    Vitals/Objective:     General: alert, cooperative, no distress   Mental  status: mental status: alert, oriented to person, place, and time, normal mood, behavior, motor activity, and thought processes   Resp: resp: normal effort and no respiratory distress           Due to this being a TeleHealth evaluation, many elements of the physical examination are unable to be assessed. Assessment and Plan:   Time-based coding, delete if not needed: I spent at least 15 minutes with this established patient, and >50% of the time was spent counseling and/or coordinating care regarding URI  Total Time: minutes: 11-20 minutes    1. Acute bacterial rhinosinusitis: concern for this developing into a pneumonia as she is also having a productive cough. Will have her come to clinic for CXR and COVID testing (she is fully vaccinated +boosted). Patient with no history of asthma, but is prescribed an albuterol inhaler.  Will hold off on steroids for now. - amoxicillin-clavulanate (AUGMENTIN) 875-125 mg per tablet; Take 1 Tablet by mouth every twelve (12) hours for 7 days. Dispense: 14 Tablet; Refill: 0  - albuterol (PROVENTIL HFA, VENTOLIN HFA, PROAIR HFA) 90 mcg/actuation inhaler; Take 2 Puffs by inhalation every four (4) hours as needed for Wheezing. Dispense: 1 Each; Refill: 3  - XR CHEST PA LAT; Future  - NOVEL CORONAVIRUS (COVID-19)  -Advised her to go to the ED if she experiences increased sob, chest pain  -F/u next week      I affirm this is a Patient Initiated Episode with an Established Patient who has not had a related appointment within my department in the past 7 days or scheduled within the next 24 hours. Note: not billable if this call serves to triage the patient into an appointment for the relevant concern     We discussed the expected course, resolution and complications of the diagnosis(es) in detail. Medication risks, benefits, costs, interactions, and alternatives were discussed as indicated. I advised her to contact the office if her condition worsens, changes or fails to improve as anticipated. She expressed understanding with the diagnosis(es) and plan. Patient understands that this encounter was a temporary measure, and the importance of further follow up and examination was emphasized. Patient verbalized understanding. Electronically Signed: Tashia Kohli DO    Providers location when delivering service: home    CPT:  90994 (5-10 minutes)  17786 (11-20 minutes)  21  (21-30 minutes)      ICD-10-CM ICD-9-CM    1.  Acute bacterial rhinosinusitis  J01.90 461.9 amoxicillin-clavulanate (AUGMENTIN) 875-125 mg per tablet    B96.89  albuterol (PROVENTIL HFA, VENTOLIN HFA, PROAIR HFA) 90 mcg/actuation inhaler      XR CHEST PA LAT      NOVEL CORONAVIRUS (COVID-19)       Pursuant to the emergency declaration under the [facility] waiver authority and the Coronavirus Preparedness and Response Supplemental Appropriations Act, this Virtual  Visit was conducted, with patient's consent, to reduce the patient's risk of exposure to COVID-19 and provide continuity of care for an established patient. Services were provided through a video synchronous discussion virtually to substitute for in-person clinic visit. History   Patients past medical, surgical and family histories were personally reviewed and updated. 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     Past Surgical History:   Procedure Laterality Date    HX ORTHOPAEDIC      VA CABG, VEIN, FIVE  2006    VA CARDIAC SURG PROCEDURE UNLIST       Family History   Problem Relation Age of Onset    No Known Problems Mother     No Known Problems Father      Social History     Socioeconomic History    Marital status:      Spouse name: Not on file    Number of children: Not on file    Years of education: Not on file    Highest education level: Not on file   Occupational History    Not on file   Tobacco Use    Smoking status: Never Smoker    Smokeless tobacco: Never Used   Substance and Sexual Activity    Alcohol use: Yes     Comment: social    Drug use: No    Sexual activity: Yes     Partners: Male     Birth control/protection: None   Other Topics Concern    Not on file   Social History Narrative    Not on file     Social Determinants of Health     Financial Resource Strain:     Difficulty of Paying Living Expenses: Not on file   Food Insecurity:     Worried About Running Out of Food in the Last Year: Not on file    Jose of Food in the Last Year: Not on file   Transportation Needs:     Lack of Transportation (Medical): Not on file    Lack of Transportation (Non-Medical):  Not on file   Physical Activity:     Days of Exercise per Week: Not on file    Minutes of Exercise per Session: Not on file   Stress:     Feeling of Stress : Not on file   Social Connections:     Frequency of Communication with Friends and Family: Not on file    Frequency of Social Gatherings with Friends and Family: Not on file    Attends Hindu Services: Not on file    Active Member of [address] [place] [address] or Organizations: Not on file    Attends Club or Organization Meetings: Not on file    Marital Status: Not on file   Intimate Partner Violence:     Fear of Current or Ex-Partner: Not on file    Emotionally Abused: Not on file    Physically Abused: Not on file    Sexually Abused: Not on file   Housing Stability:     Unable to Pay for Housing in the Last Year: Not on file    Number of [place] in the Last Year: Not on file    Unstable Housing in the Last Year: Not on file            Current Medications/Allergies   Medications and Allergies reviewed:    Current Outpatient Medications   Medication Sig Dispense Refill    amoxicillin-clavulanate (AUGMENTIN) 875-125 mg per tablet Take 1 Tablet by mouth every twelve (12) hours for 7 days. 14 Tablet 0    albuterol (PROVENTIL HFA, VENTOLIN HFA, PROAIR HFA) 90 mcg/actuation inhaler Take 2 Puffs by inhalation every four (4) hours as needed for Wheezing. 1 Each 3    clopidogreL (PLAVIX) 75 mg tab TAKE ONE TABLET BY MOUTH ONCE A DAY 90 Tablet 1    gabapentin (NEURONTIN) 300 mg capsule TAKE ONE CAPSULE BY MOUTH 3 TIMES A DAY AS NEEDED FOR PAIN  Indications: \"change of life\" signs 90 Capsule 1    fluticasone propionate (FLONASE) 50 mcg/actuation nasal spray USE 2 SPRAYS IN BOTH NOSTRILS DAILY 16 g 2    furosemide (LASIX) 20 mg tablet Take 1 tablet daily as needed for swelling 60 Tablet 1    atenoloL (TENORMIN) 50 mg tablet Take 1 Tablet by mouth daily. 90 Tablet 1    simvastatin (ZOCOR) 40 mg tablet Take 1 Tablet by mouth nightly.  90 Tablet 1    esomeprazole (NEXIUM) 40 mg capsule Take 1 Capsule by mouth daily. 90 Capsule 1    nitroglycerin (NITROSTAT) 0.4 mg SL tablet 1 Tablet by SubLINGual route as needed for Chest Pain. 25 Each 0    aspirin 81 mg tablet Take 81 mg by mouth.          Allergies   Allergen Reactions    Percocet [Oxycodone-Acetaminophen] Itching

## 2022-01-27 NOTE — PROGRESS NOTES
1. Have you been to the ER, urgent care clinic since your last visit? Hospitalized since your last visit? No    2. Have you seen or consulted any other health care providers outside of the [address] [place] [address] since your last visit? Include any pap smears or colon screening.  No      Health Maintenance Due   Topic Date Due    COVID-19 Vaccine (1) Never done    DTaP/Tdap/Td series (1 - Tdap) Never done    Shingrix Vaccine Age 50> (1 of 2) Never done    Bone Densitometry (Dexa) Screening  Never done    Breast Cancer Screen Mammogram  05/20/2017    Pneumococcal 65+ years (2 of 2 - PPSV23) 10/18/2021

## 2022-02-01 ENCOUNTER — VIRTUAL VISIT (OUTPATIENT)
Dept: FAMILY MEDICINE CLINIC | Age: 72
End: 2022-02-01
Payer: MEDICARE

## 2022-02-01 DIAGNOSIS — J06.9 VIRAL UPPER RESPIRATORY TRACT INFECTION: Primary | ICD-10-CM

## 2022-02-01 PROCEDURE — 99441 PR PHYS/QHP TELEPHONE EVALUATION 5-10 MIN: CPT | Performed by: FAMILY MEDICINE

## 2022-02-01 NOTE — PROGRESS NOTES
Suly Antony is a 73 y.o. female evaluated via Telephone on 22. Patient Identity confirmed by . Consent:  He and/or health care decision maker is aware that that he may receive a bill for this telephone service, depending on his insurance coverage, and has provided verbal consent to proceed: Yes    Physician Location: Office  Patient Location: Home  Nurse Assisting with Encounter: Balwinder Davis LPN    Chief Complaint   Patient presents with   Manuelito Batters Symptoms      Information gathered from patient and/or health care decision maker. HPI:   Upper respiratory illness:  Presents with complaints of improving congestion and cough, had severe symptoms for 5 days. Then has been improving. Reports no nausea and no vomiting. Denies sore throat, myalgias, headache, fever and chills. Symptoms are mild. Over-the-counter remedies including Flonase, VIcks. Is completing antibiotic course now. Drinking plenty of fluids: no  History of Asthma/COPD:  no  Smoking Status: non-smoker  Sick Contacts: non known      Review of Systems   Constitutional: Positive for malaise/fatigue. Negative for chills and fever. HENT: Positive for congestion. Respiratory: Positive for cough. Neurological: Negative for dizziness and headaches. Limited Exam:  Due to this being a TeleHealth evaluation, many elements of the physical examination are unable to be assessed. Constitutional: Appears well-developed and well-nourished in no apparent distress   Mental status: Alert and awake, Oriented to person/place/time, Able to follow commands  Psychiatric: Normal affect, normal judgment/insight. No hallucinations     Current Outpatient Medications on File Prior to Visit   Medication Sig Dispense Refill    amoxicillin-clavulanate (AUGMENTIN) 875-125 mg per tablet Take 1 Tablet by mouth every twelve (12) hours for 7 days.  14 Tablet 0    albuterol (PROVENTIL HFA, VENTOLIN HFA, PROAIR HFA) 90 mcg/actuation inhaler Take 2 Puffs by inhalation every four (4) hours as needed for Wheezing. 1 Each 3    clopidogreL (PLAVIX) 75 mg tab TAKE ONE TABLET BY MOUTH ONCE A DAY 90 Tablet 1    gabapentin (NEURONTIN) 300 mg capsule TAKE ONE CAPSULE BY MOUTH 3 TIMES A DAY AS NEEDED FOR PAIN  Indications: \"change of life\" signs 90 Capsule 1    fluticasone propionate (FLONASE) 50 mcg/actuation nasal spray USE 2 SPRAYS IN BOTH NOSTRILS DAILY 16 g 2    furosemide (LASIX) 20 mg tablet Take 1 tablet daily as needed for swelling 60 Tablet 1    atenoloL (TENORMIN) 50 mg tablet Take 1 Tablet by mouth daily. 90 Tablet 1    simvastatin (ZOCOR) 40 mg tablet Take 1 Tablet by mouth nightly. 90 Tablet 1    esomeprazole (NEXIUM) 40 mg capsule Take 1 Capsule by mouth daily. 90 Capsule 1    nitroglycerin (NITROSTAT) 0.4 mg SL tablet 1 Tablet by SubLINGual route as needed for Chest Pain. 25 Each 0    aspirin 81 mg tablet Take 81 mg by mouth. No current facility-administered medications on file prior to visit.         Allergies   Allergen Reactions    Percocet [Oxycodone-Acetaminophen] Itching        Patient Active Problem List    Diagnosis Date Noted    Moderate episode of recurrent major depressive disorder ([facility] [address] 10/17/2017    Gastroesophageal reflux disease without esophagitis 02/03/2016    Allergic rhinitis 10/04/2012    Insomnia 06/05/2012    B12 deficiency 11/04/2011    Osteopenia 06/03/2011    Anxiety 05/16/2011    Hyperlipidemia 05/16/2011    CAD (coronary artery disease) 05/16/2011    Blindness 05/16/2011    HTN (hypertension) 05/16/2011        Health Maintenance Due   Topic Date Due    COVID-19 Vaccine (1) Never done    DTaP/Tdap/Td series (1 - Tdap) Never done    Shingrix Vaccine Age 50> (1 of 2) Never done    Bone Densitometry (Dexa) Screening  Never done    Breast Cancer Screen Mammogram  05/20/2017    Pneumococcal 65+ years (2 of 2 - PPSV23) 10/18/2021        Assessment/Plan:  Details of this discussion including any medical advice provided: Continue Symptomatic therapies and finish ANtibiotics. No need for CXR at this time. ICD-10-CM ICD-9-CM    1. Viral upper respiratory tract infection  J06.9 465.9      Follow-up and Dispositions    · Return if symptoms worsen or fail to improve. Total Time: minutes: 5-10 minutes was spent on telemedicine encounter discussing above problems and plans. Patient Problem list, medications, and Allergies were reviewed during this encounter. Pursuant to the emergency declaration under the [address] [place] [address], [facility] waiver authority and the [employer] Act, this Telephone Visit was conducted, with patient's consent, to reduce the patient's risk of exposure to COVID-19 and provide continuity of care for an established patient. I affirm this is a Patient Initiated Episode with an New Patient who has not had a related appointment within my department in the past 7 days or scheduled within the next 24 hours. Discussed diagnoses in detail with patient. Medication risks/benefits/side effects discussed with patient. All of the patient's questions were addressed. The patient understands and agrees with our plan of care. The patient knows to call back if they are unsure of or forget any changes we discussed today or if the symptoms change.     Note: not billable if this call serves to triage the patient into an appointment for the relevant concern    MD LINA Prasad & AVELINA REDMAN [facility]  02/01/22

## 2022-02-01 NOTE — PROGRESS NOTES
Identified pt with two pt identifiers(name and ). Reviewed record in preparation for visit and have obtained necessary documentation. Chief Complaint   Patient presents with    Cold Symptoms        There were no vitals filed for this visit. Health Maintenance Due   Topic    COVID-19 Vaccine (1)    DTaP/Tdap/Td series (1 - Tdap)    Shingrix Vaccine Age 49> (1 of 2)    Bone Densitometry (Dexa) Screening     Breast Cancer Screen Mammogram     Pneumococcal 65+ years (2 of 2 - PPSV23)       Patient is accompanied by self I have received verbal consent from Amy Claire to discuss any/all medical information while they are present in the room.

## 2022-02-01 NOTE — PROGRESS NOTES
[address] Medicine Residency Attending Addendum:  Dr. Rich Asencio, DO,  the patient and I were not physically present during this encounter. The resident and I are concurrently monitoring the patient care through appropriate telecommunication technology. I discussed the findings, assessment and plan with the resident and agree with the resident's findings and plan as documented in the resident's note.       Rogerio Leonard MD

## 2022-02-07 DIAGNOSIS — K21.9 GASTROESOPHAGEAL REFLUX DISEASE WITHOUT ESOPHAGITIS: ICD-10-CM

## 2022-02-07 RX ORDER — ESOMEPRAZOLE MAGNESIUM 40 MG/1
40 CAPSULE, DELAYED RELEASE ORAL DAILY
Qty: 90 CAPSULE | Refills: 1 | Status: SHIPPED | OUTPATIENT
Start: 2022-02-07 | End: 2022-05-09 | Stop reason: SDUPTHER

## 2022-02-11 ENCOUNTER — TELEPHONE (OUTPATIENT)
Dept: FAMILY MEDICINE CLINIC | Age: 72
End: 2022-02-11

## 2022-02-11 NOTE — TELEPHONE ENCOUNTER
Pt is having an ear ache in her right ear that is not going away. Pt spoke with Dr during [facility] and informed him of this. Pt would like to know if Dr can call in and Rx to [facility] that will help. Please advise.

## 2022-02-11 NOTE — TELEPHONE ENCOUNTER
Pt called and said she would try the extra usage of the Nasal Spray. Will call back for an appointment. Has to get someone to drive for her because of her blindness.

## 2022-02-28 ENCOUNTER — TELEPHONE (OUTPATIENT)
Dept: FAMILY MEDICINE CLINIC | Age: 72
End: 2022-02-28

## 2022-02-28 ENCOUNTER — OFFICE VISIT (OUTPATIENT)
Dept: FAMILY MEDICINE CLINIC | Age: 72
End: 2022-02-28
Payer: MEDICARE

## 2022-02-28 VITALS
WEIGHT: 167.5 LBS | HEART RATE: 55 BPM | BODY MASS INDEX: 31.63 KG/M2 | DIASTOLIC BLOOD PRESSURE: 67 MMHG | HEIGHT: 61 IN | RESPIRATION RATE: 20 BRPM | OXYGEN SATURATION: 98 % | SYSTOLIC BLOOD PRESSURE: 133 MMHG | TEMPERATURE: 97.9 F

## 2022-02-28 DIAGNOSIS — I10 PRIMARY HYPERTENSION: ICD-10-CM

## 2022-02-28 DIAGNOSIS — F33.1 MODERATE EPISODE OF RECURRENT MAJOR DEPRESSIVE DISORDER (HCC): ICD-10-CM

## 2022-02-28 DIAGNOSIS — I25.10 CORONARY ARTERY DISEASE DUE TO LIPID RICH PLAQUE: Primary | ICD-10-CM

## 2022-02-28 DIAGNOSIS — G89.4 CHRONIC PAIN SYNDROME: ICD-10-CM

## 2022-02-28 DIAGNOSIS — H54.3 BLINDNESS OF BOTH EYES: ICD-10-CM

## 2022-02-28 DIAGNOSIS — I25.83 CORONARY ARTERY DISEASE DUE TO LIPID RICH PLAQUE: Primary | ICD-10-CM

## 2022-02-28 DIAGNOSIS — M25.561 RIGHT KNEE PAIN, UNSPECIFIED CHRONICITY: ICD-10-CM

## 2022-02-28 DIAGNOSIS — F13.20 BENZODIAZEPINE DEPENDENCE (HCC): ICD-10-CM

## 2022-02-28 DIAGNOSIS — E53.8 B12 DEFICIENCY: ICD-10-CM

## 2022-02-28 DIAGNOSIS — K21.9 GASTROESOPHAGEAL REFLUX DISEASE WITHOUT ESOPHAGITIS: ICD-10-CM

## 2022-02-28 DIAGNOSIS — E78.5 HYPERLIPIDEMIA, UNSPECIFIED HYPERLIPIDEMIA TYPE: ICD-10-CM

## 2022-02-28 PROCEDURE — 3017F COLORECTAL CA SCREEN DOC REV: CPT | Performed by: FAMILY MEDICINE

## 2022-02-28 PROCEDURE — 1101F PT FALLS ASSESS-DOCD LE1/YR: CPT | Performed by: FAMILY MEDICINE

## 2022-02-28 PROCEDURE — G8400 PT W/DXA NO RESULTS DOC: HCPCS | Performed by: FAMILY MEDICINE

## 2022-02-28 PROCEDURE — G9717 DOC PT DX DEP/BP F/U NT REQ: HCPCS | Performed by: FAMILY MEDICINE

## 2022-02-28 PROCEDURE — G8427 DOCREV CUR MEDS BY ELIG CLIN: HCPCS | Performed by: FAMILY MEDICINE

## 2022-02-28 PROCEDURE — 99214 OFFICE O/P EST MOD 30 MIN: CPT | Performed by: FAMILY MEDICINE

## 2022-02-28 PROCEDURE — G8536 NO DOC ELDER MAL SCRN: HCPCS | Performed by: FAMILY MEDICINE

## 2022-02-28 PROCEDURE — G8752 SYS BP LESS 140: HCPCS | Performed by: FAMILY MEDICINE

## 2022-02-28 PROCEDURE — G8417 CALC BMI ABV UP PARAM F/U: HCPCS | Performed by: FAMILY MEDICINE

## 2022-02-28 PROCEDURE — G8754 DIAS BP LESS 90: HCPCS | Performed by: FAMILY MEDICINE

## 2022-02-28 PROCEDURE — 1090F PRES/ABSN URINE INCON ASSESS: CPT | Performed by: FAMILY MEDICINE

## 2022-02-28 NOTE — LETTER
AGREEMENT for controlled medication treatment    I, Mann Morton, have agreed to a course of treatment that includes taking controlled medication. For this treatment I designate _____________________________________ as the [facility] Provider.     The purpose of this agreement is to prevent misunderstandings about controlled medications I may be taking for treatment, and to comply with applicable laws. I understand this agreement is essential to the trust and confidence necessary in a provider-patient relationship and that the designated provider will treat me in accordance with the statements below. As part of my treatment I agree to the followin.  USE  a. I will take the controlled medication as instructed by the designated provider and avoid improper use of controlled medications. b.   I will not share, sell or trade controlled medication with anyone as this is a criminal offense.   c.   I will not use any illegal controlled substance, including marijuana, cocaine, etc. as this is a criminal offense. 2.  PROVIDERS  a. I will only obtain controlled medication from the designated provider. b.   I will not attempt to obtain the same or similar controlled medications, such as opioid pain medication, stimulants, anti-anxiety or hypnotics from any other provider as this is a criminal offense.  c.   I will inform the designated provider about all other licensed professionals providing medical care to me and authorize communication between all providers to coordinate care, particularly prescribing or dispensing of controlled medications. 3.  PHARMACY  a.   I will only fill controlled medication prescriptions at the approved pharmacy as listed below. 4.  OFFICE VISITS  a. I agree to attend scheduled office visit appointments. b.   I am aware my office visits may be monthly or as determined necessary by the designated provider.   c.   I will communicate fully with the designated provider about the character and intensity of my symptoms, the effect of the symptoms on my daily life, and how well the controlled medication is helping to relieve the cause of my symptoms. 5.  REFILLS OR CALL-IN PRESCRIPTIONS OF CONTROLLED MEDICATIONS  a. I agree that refills of my prescriptions for controlled medications will be made at the time of an office visit during regular office hours. No refills will be available during evenings or on weekends. Abusive or inappropriate behavior related to medication refills will not be tolerated. b.   I will not call the office repeatedly to inquire about my controlled medication. I understand that medications will be written on the due date and not before. Calling the office repeatedly will be considered harassment, and I may be discharged from the practice. c.   Controlled medications may not be called in for refill, but doing so is at the discretion of the designated provider. d.   I am aware that only I must  prescriptions for controlled medications at the office but the designated provider may allow a designee to  the prescription from the office under very specific circumstance that may develop. 6.  TOXICOLOGY SCREENING  a. I agree to random urine toxicology screenings in order to comply with government and [address] [place] [address] regulations. I understand that I will be financially responsible for any charges incurred by this office, Silvano Harper of [facility] laboratory, [employer], or [facility] for the urine toxicology screening, which may not be covered by my insurance. Failure to do so will be considered non-compliance and I may be discharged. b. In some cases an oral swab or hair sample may be substituted for a urine screen. This is at the discretion of the designated provider.   I understand that I will be financially responsible for any charges incurred as well.  c.   I understand that if the urine toxicology does not show my medications prescribed to me by the designated provider, or it shows any illegal substance or any other medications NOT prescribed by the designated providers, I may be discharged from this practice at the discretion of the designated provider and no further controlled medications will be prescribed or follow-up appointments scheduled. Also, if any illegal substances are detected on the urine toxicology, this information may be provided to local law enforcement. 7. PILL COUNTS  a. I am aware I may be called at any time to come into the office for a count of all my remaining controlled medications in order to help the designated provider understand the rate at which I use my controlled medications and to more effectively adjust dosage. b. I agree that I will use my medication at a rate NO greater than the prescribed rate and that use of my medication at a greater rate will result in my being without medication for the period of time until next expected due date. c. I will bring in the containers with the medication prescribed by all providers, including the designated provider, to each office visit even if there is no medication remaining. All controlled medication will be in the original containers from the pharmacy for each medication. Failure to do so will be considered non-compliance and I may be discharged from the practice. 8.  LOSS OR THEFT OF MEDICATION  a. I will safeguard my controlled medication from loss or theft. b.   Lost or stolen controlled medication may not be replaced. This includes a prescription that has not yet been filled at the pharmacy. c.   In the event my controlled medications are stolen or lost, I will notify the designated providers office immediately.   If such event occurs during the night, weekend or holiday, I will leave a detailed message on the answering machine or answering service at the number listed above.  d.   I will file and produce an official police report for any effort to replace controlled medications prescribed. 9.  AGENCY COLLABORATION  I authorize the designated provider and the authorized pharmacy/pharmacist to cooperate fully with any city, state, or federal law enforcement agency in the investigation of any possible misuse, sale or other diversion of my controlled medication. 10.  TREATMENT  I understand that if I violate any of the conditions, my controlled medication and/or treatment will be terminated. If the violation involves obtaining controlled substances and/or dangerous drugs from another source, the incident may be reported to other medical facilities and authorities, including law enforcement. In this case, the designated provider may taper off the medication over a period of several days, as necessary, to avoid withdrawal symptoms or will suggest alternate treatment facilities. Also, a drug dependence treatment program may be recommended. 11.  AGREEMENT  I agree to follow these guidelines that have been fully explained to me. All of my questions and concerns regarding treatment have been adequately answered. A copy of this document has been given to me. I agree to use ______________________________ Authorized Pharmacy located at _________________________________________________________________      Telephone ________________________________for filling ALL controlled medication prescriptions.     This agreement is entered into on 2/28/2022     Patient signature__________________________________________________________    Legal Guardian signature___________________________________________________    Provider signature_________________________________________________________    Witness signature_________________________________________________________

## 2022-02-28 NOTE — PROGRESS NOTES
1. Have you been to the ER, urgent care clinic since your last visit? Hospitalized since your last visit? No    2. Have you seen or consulted any other health care providers outside of the [address] [place] [address] since your last visit? Including any pap smears or colon screening.  No    Health Maintenance Due   Topic Date Due    COVID-19 Vaccine (1) Never done    DTaP/Tdap/Td series (1 - Tdap) Never done    Shingrix Vaccine Age 50> (1 of 2) Never done    Bone Densitometry (Dexa) Screening  Never done    Breast Cancer Screen Mammogram  05/20/2017    Pneumococcal 65+ years (2 of 2 - PPSV23) 10/18/2021   Patient interested in Pneumococcal

## 2022-02-28 NOTE — TELEPHONE ENCOUNTER
Vaccine ordered to pharmacy.     MD LINA Romo & AVELINA REDMAN [facility]  02/28/22

## 2022-02-28 NOTE — PROGRESS NOTES
[facility]    History of Present Illness:   Rajendra Mon is a 73 y.o. female with history of CAD, HTN, GERD, Anxiety, HLD, Blindness, insomnia, Depression  CC: Follow up  History provided by patient and Records    HPI:  Chronic Pain: Patient is taking Gabapentin at this time for pain, overall working well. Anxiety/Depression: Patient has stopped taking Lorazepam per patient. Per  last fill was several months ago. Coronary Artery Disease Follow up: Today patient reports he is feeling well and overall his symptoms are controlled on his current medications. she  has not had a history of acute myocardial infarction in the past.  Prior management has included:   - Coronary stenting: NO  - Coronary bypass: YES    she has not had CHF      Key CAD CHF Meds             clopidogreL (PLAVIX) 75 mg tab (Taking) TAKE ONE TABLET BY MOUTH ONCE A DAY    furosemide (LASIX) 20 mg tablet (Taking) Take 1 tablet daily as needed for swelling    atenoloL (TENORMIN) 50 mg tablet (Taking) Take 1 Tablet by mouth daily. simvastatin (ZOCOR) 40 mg tablet (Taking) Take 1 Tablet by mouth nightly. nitroglycerin (NITROSTAT) 0.4 mg SL tablet (Taking) 1 Tablet by SubLINGual route as needed for Chest Pain. aspirin 81 mg tablet (Taking) Take 81 mg by mouth. she is on a statin ( Zocor (simvastatin) ). she is on antiplatelet therapy. she is on a beta blocker. she is not on a regular Nitrate therapy. she does use Nitroglycerin as needed for chest pain. Residual symptoms of CAD include dyspnea. Patient denies any current chest pain, fatigue, feeding intolerance, lower extremity edema, palpitations. Risk factors are controlled or at goal.  Primary risk factors include elevated cholesterol, hypertension and known history of coronary artery disease.             Health Maintenance  Health Maintenance Due   Topic Date Due    COVID-19 Vaccine (1) Never done    DTaP/Tdap/Td series (1 - Tdap) Never done    Shingrix Vaccine Age 50> (1 of 2) Never done    Bone Densitometry (Dexa) Screening  Never done    Breast Cancer Screen Mammogram  05/20/2017    Pneumococcal 65+ years (2 of 2 - PPSV23) 10/18/2021       Past Medical, Family, and Social History:     Current Outpatient Medications on File Prior to Visit   Medication Sig Dispense Refill    esomeprazole (NEXIUM) 40 mg capsule Take 1 Capsule by mouth daily. 90 Capsule 1    albuterol (PROVENTIL HFA, VENTOLIN HFA, PROAIR HFA) 90 mcg/actuation inhaler Take 2 Puffs by inhalation every four (4) hours as needed for Wheezing. 1 Each 3    clopidogreL (PLAVIX) 75 mg tab TAKE ONE TABLET BY MOUTH ONCE A DAY 90 Tablet 1    gabapentin (NEURONTIN) 300 mg capsule TAKE ONE CAPSULE BY MOUTH 3 TIMES A DAY AS NEEDED FOR PAIN  Indications: \"change of life\" signs 90 Capsule 1    fluticasone propionate (FLONASE) 50 mcg/actuation nasal spray USE 2 SPRAYS IN BOTH NOSTRILS DAILY 16 g 2    furosemide (LASIX) 20 mg tablet Take 1 tablet daily as needed for swelling 60 Tablet 1    atenoloL (TENORMIN) 50 mg tablet Take 1 Tablet by mouth daily. 90 Tablet 1    simvastatin (ZOCOR) 40 mg tablet Take 1 Tablet by mouth nightly. 90 Tablet 1    nitroglycerin (NITROSTAT) 0.4 mg SL tablet 1 Tablet by SubLINGual route as needed for Chest Pain. 25 Each 0    aspirin 81 mg tablet Take 81 mg by mouth. No current facility-administered medications on file prior to visit.        Patient Active Problem List   Diagnosis Code    Anxiety F41.9    Hyperlipidemia E78.5    CAD (coronary artery disease) I25.10    Blindness H54.7    HTN (hypertension) I10    Osteopenia M85.80    B12 deficiency E53.8    Insomnia G47.00    Allergic rhinitis J30.9    Gastroesophageal reflux disease without esophagitis K21.9    Moderate episode of recurrent major depressive disorder (HCC) F33.1       Social History     Socioeconomic History    Marital status:    Tobacco Use    Smoking status: Never Smoker    Smokeless tobacco: Never Used   Substance and Sexual Activity    Alcohol use: Yes     Comment: social    Drug use: No    Sexual activity: Yes     Partners: Male     Birth control/protection: None        Review of Systems   Review of Systems   Constitutional: Negative for chills and fever. HENT: Negative for congestion and nosebleeds. Cardiovascular: Negative for chest pain and palpitations. Gastrointestinal: Negative for abdominal pain, nausea and vomiting. Neurological: Negative for dizziness and headaches. Objective:     Visit Vitals  /67 (BP 1 Location: Right arm, BP Patient Position: Sitting)   Pulse (!) 55   Temp 97.9 °F (36.6 °C) (Oral)   Resp 20   Ht 5' 1\" (1.549 m)   Wt 167 lb 8 oz (76 kg)   SpO2 98%   BMI 31.65 kg/m²        Physical Exam  Vitals and nursing note reviewed. Constitutional:       Appearance: Normal appearance. HENT:      Head: Normocephalic and atraumatic. Cardiovascular:      Rate and Rhythm: Normal rate and regular rhythm. Pulses: Normal pulses. Heart sounds: Normal heart sounds. No murmur heard. No friction rub. No gallop. Pulmonary:      Effort: Pulmonary effort is normal.      Breath sounds: Normal breath sounds. Abdominal:      General: Abdomen is flat. Bowel sounds are normal.      Palpations: Abdomen is soft. Genitourinary:     General: Normal vulva. Rectum: Normal.   Musculoskeletal:         General: Normal range of motion. Cervical back: Normal range of motion and neck supple. Skin:     General: Skin is warm and dry. Neurological:      General: No focal deficit present. Mental Status: She is alert and oriented to person, place, and time. Psychiatric:         Mood and Affect: Mood normal.         Behavior: Behavior normal.         Pertinent Labs/Studies:      Assessment and orders:       ICD-10-CM ICD-9-CM    1. Coronary artery disease due to lipid rich plaque  I25.10 414.00     I25.83 414.3    2. Primary hypertension  I10 401.9 CBC W/O DIFF      METABOLIC PANEL, COMPREHENSIVE   3. Gastroesophageal reflux disease without esophagitis  K21.9 530.81    4. Hyperlipidemia, unspecified hyperlipidemia type  E78.5 272.4 LIPID PANEL   5. B12 deficiency  E53.8 266.2 VITAMIN B12 & FOLATE   6. Blindness of both eyes  H54.3 369.00    7. Right knee pain, unspecified chronicity  M25.561 719.46    8. Chronic pain syndrome  G89.4 338.4 COMPLIANCE DRUG SCREEN/PRESCRIPTION MONITORING   9. Benzodiazepine dependence ([facility])  F13.20 304.10    10. Moderate episode of recurrent major depressive disorder ([facility])  F33.1 296.32      Diagnoses and all orders for this visit:    1. Coronary artery disease due to lipid rich plaque: Currently symptoms are well managed on current medication regimen. Risk factors are being controlled at this time. Today we will be making any changes to her treatment plan. Recommendations at this time include None. Keep up the good work! .     2. Primary hypertension: Our goal is to normalize the blood pressure to decrease the risks of strokes and heart attacks. The patient is in agreement with the plan. -     CBC W/O DIFF; Future  -     METABOLIC PANEL, COMPREHENSIVE; Future    3. Gastroesophageal reflux disease without esophagitis    4. Hyperlipidemia, unspecified hyperlipidemia type The patient is aware of our goal to reduce or eliminate the long term problems (such as strokes and heart attacks) related to poorly controlled Triglycerides, LDL, Cholesterol.   -     LIPID PANEL; Future    5. B12 deficiency  -     VITAMIN B12 & FOLATE; Future    6. Blindness of both eyes    7. Right knee pain, unspecified chronicity/Chronic pain syndrome: Compliance drug screen.  reviewed and appropriate. CSC to be completed, discussed with prior PCP.  -     COMPLIANCE DRUG SCREEN/PRESCRIPTION MONITORING; Future    9. Benzodiazepine dependence ([facility] [address]): Patient is not taking Lorazepam per patient, getting compliance screen.  appears to support. 10. Moderate episode of recurrent major depressive disorder ([facility] [address]      Follow-up and Dispositions    · Return in about 3 months (around 5/28/2022). I have discussed the diagnosis with the patient and the intended plan as seen in the above orders. Social history, medical history, and labs were reviewed. The patient has received an after-visit summary and questions were answered concerning future plans. I have discussed medication side effects and warnings with the patient as well.     MD LINA Hogue & AVELINA REDMAN [facility]  02/28/22

## 2022-03-01 LAB
ALBUMIN SERPL-MCNC: 3.9 G/DL (ref 3.5–5)
ALBUMIN/GLOB SERPL: 1.2 {RATIO} (ref 1.1–2.2)
ALP SERPL-CCNC: 78 U/L (ref 45–117)
ALT SERPL-CCNC: 22 U/L (ref 12–78)
ANION GAP SERPL CALC-SCNC: 4 MMOL/L (ref 5–15)
AST SERPL-CCNC: 20 U/L (ref 15–37)
BILIRUB SERPL-MCNC: 0.3 MG/DL (ref 0.2–1)
BUN SERPL-MCNC: 16 MG/DL (ref 6–20)
BUN/CREAT SERPL: 19 (ref 12–20)
CALCIUM SERPL-MCNC: 9.4 MG/DL (ref 8.5–10.1)
CHLORIDE SERPL-SCNC: 109 MMOL/L (ref 97–108)
CHOLEST SERPL-MCNC: 163 MG/DL
CO2 SERPL-SCNC: 26 MMOL/L (ref 21–32)
COMMENT, HOLDF: NORMAL
CREAT SERPL-MCNC: 0.83 MG/DL (ref 0.55–1.02)
ERYTHROCYTE [DISTWIDTH] IN BLOOD BY AUTOMATED COUNT: 12.5 % (ref 11.5–14.5)
FOLATE SERPL-MCNC: 19.4 NG/ML (ref 5–21)
GLOBULIN SER CALC-MCNC: 3.3 G/DL (ref 2–4)
GLUCOSE SERPL-MCNC: 99 MG/DL (ref 65–100)
HCT VFR BLD AUTO: 42.6 % (ref 35–47)
HDLC SERPL-MCNC: 57 MG/DL
HDLC SERPL: 2.9 {RATIO} (ref 0–5)
HGB BLD-MCNC: 13.7 G/DL (ref 11.5–16)
LDLC SERPL CALC-MCNC: 85 MG/DL (ref 0–100)
MCH RBC QN AUTO: 33.5 PG (ref 26–34)
MCHC RBC AUTO-ENTMCNC: 32.2 G/DL (ref 30–36.5)
MCV RBC AUTO: 104.2 FL (ref 80–99)
NRBC # BLD: 0 K/UL (ref 0–0.01)
NRBC BLD-RTO: 0 PER 100 WBC
PLATELET # BLD AUTO: 192 K/UL (ref 150–400)
PMV BLD AUTO: 11.9 FL (ref 8.9–12.9)
POTASSIUM SERPL-SCNC: 4.6 MMOL/L (ref 3.5–5.1)
PROT SERPL-MCNC: 7.2 G/DL (ref 6.4–8.2)
RBC # BLD AUTO: 4.09 M/UL (ref 3.8–5.2)
SAMPLES BEING HELD,HOLD: NORMAL
SODIUM SERPL-SCNC: 139 MMOL/L (ref 136–145)
TRIGL SERPL-MCNC: 105 MG/DL (ref ?–150)
VIT B12 SERPL-MCNC: 383 PG/ML (ref 193–986)
VLDLC SERPL CALC-MCNC: 21 MG/DL
WBC # BLD AUTO: 6.7 K/UL (ref 3.6–11)

## 2022-03-08 ENCOUNTER — TELEPHONE (OUTPATIENT)
Dept: FAMILY MEDICINE CLINIC | Age: 72
End: 2022-03-08

## 2022-03-08 NOTE — TELEPHONE ENCOUNTER
----- Message from Mitchell Miramontes sent at 3/8/2022  8:25 AM EST -----  Subject: Results Request    QUESTIONS  Which lab or imaging result is the patient calling about? Most recent lab   work  Which provider ordered the test? Lyle Woods III   At what location was the test performed? Date the test was performed? 2022-02-28  Additional Information for Provider?   ---------------------------------------------------------------------------  --------------  CALL BACK INFO  What is the best way for the office to contact you? OK to leave message on   voicemail  Preferred Call Back Phone Number?  [phone]

## 2022-03-08 NOTE — TELEPHONE ENCOUNTER
Called pt and read out the letter from Dr. Dionicio Beasley regarding labs. Pt verbalized understanding and has no questions or concerns.

## 2022-03-09 ENCOUNTER — TELEPHONE (OUTPATIENT)
Dept: FAMILY MEDICINE CLINIC | Age: 72
End: 2022-03-09

## 2022-03-09 NOTE — TELEPHONE ENCOUNTER
Pt states she is having a thobbing pain in her right side above and below breast for the past few days. Pt thinks it is gas and just needs an rx. Please advise.

## 2022-03-09 NOTE — TELEPHONE ENCOUNTER
Call placed to pt in regards to her earlier call regarding chest pains. She stated that she drunk a soda and was feeling better.

## 2022-03-10 LAB — DRUGS UR: NORMAL

## 2022-03-14 DIAGNOSIS — I25.10 CORONARY ARTERY DISEASE DUE TO LIPID RICH PLAQUE: ICD-10-CM

## 2022-03-14 DIAGNOSIS — I25.83 CORONARY ARTERY DISEASE DUE TO LIPID RICH PLAQUE: ICD-10-CM

## 2022-03-14 RX ORDER — ATENOLOL 50 MG/1
50 TABLET ORAL DAILY
Qty: 90 TABLET | Refills: 1 | Status: SHIPPED | OUTPATIENT
Start: 2022-03-14 | End: 2022-06-10 | Stop reason: SDUPTHER

## 2022-03-19 PROBLEM — F33.1 MODERATE EPISODE OF RECURRENT MAJOR DEPRESSIVE DISORDER (HCC): Status: ACTIVE | Noted: 2017-10-17

## 2022-03-21 DIAGNOSIS — I25.83 CORONARY ARTERY DISEASE DUE TO LIPID RICH PLAQUE: ICD-10-CM

## 2022-03-21 DIAGNOSIS — I25.10 CORONARY ARTERY DISEASE DUE TO LIPID RICH PLAQUE: ICD-10-CM

## 2022-03-21 DIAGNOSIS — E78.5 HYPERLIPIDEMIA, UNSPECIFIED HYPERLIPIDEMIA TYPE: ICD-10-CM

## 2022-03-21 RX ORDER — SIMVASTATIN 40 MG/1
40 TABLET, FILM COATED ORAL
Qty: 90 TABLET | Refills: 1 | Status: SHIPPED | OUTPATIENT
Start: 2022-03-21 | End: 2022-06-10 | Stop reason: SDUPTHER

## 2022-04-12 DIAGNOSIS — Z79.899 CONTROLLED SUBSTANCE AGREEMENT SIGNED: ICD-10-CM

## 2022-04-12 DIAGNOSIS — M25.561 RIGHT KNEE PAIN, UNSPECIFIED CHRONICITY: ICD-10-CM

## 2022-04-12 RX ORDER — GABAPENTIN 300 MG/1
CAPSULE ORAL
Qty: 90 CAPSULE | Refills: 1 | Status: SHIPPED | OUTPATIENT
Start: 2022-04-12 | End: 2022-07-14 | Stop reason: SDUPTHER

## 2022-04-12 NOTE — TELEPHONE ENCOUNTER
reviewed, LOV - 2/28/2022, Last UDS - 2/28/22, CSC on file - Yes   Refill Appropriate - Yes       MD LINA Lau & AVELINA REDMAN [facility]  04/12/22

## 2022-04-18 ENCOUNTER — TELEPHONE (OUTPATIENT)
Dept: FAMILY MEDICINE CLINIC | Age: 72
End: 2022-04-18

## 2022-04-18 NOTE — TELEPHONE ENCOUNTER
Call placed to pt and informed her per  that she would need to be evaluated. Pt stated she didn't know he would need to look at and would continue with the over the counter. Pt informed that if condition gets any worst please call for an appt to have this evaluated.

## 2022-04-18 NOTE — TELEPHONE ENCOUNTER
----- Message from [facility] sent at 4/18/2022  4:20 PM EDT -----  Subject: Message to Provider    QUESTIONS  Information for Provider? Pt has a anal fissure and would like a   prescription to be called in to [address] [place] [address], [facility]. Pt has tried over the counter medication and it has not   work for the pt. PT can be reached at [phone].  ---------------------------------------------------------------------------  --------------  Lyndsey GREWAL  What is the best way for the office to contact you? OK to leave message on   voicemail  Preferred Call Back Phone Number? [phone]  ---------------------------------------------------------------------------  --------------  SCRIPT ANSWERS  Relationship to Patient?  Self

## 2022-04-25 DIAGNOSIS — I25.10 CORONARY ARTERY DISEASE DUE TO LIPID RICH PLAQUE: ICD-10-CM

## 2022-04-25 DIAGNOSIS — I25.83 CORONARY ARTERY DISEASE DUE TO LIPID RICH PLAQUE: ICD-10-CM

## 2022-04-25 RX ORDER — CLOPIDOGREL BISULFATE 75 MG/1
TABLET ORAL
Qty: 90 TABLET | Refills: 1 | Status: SHIPPED | OUTPATIENT
Start: 2022-04-25 | End: 2022-07-25 | Stop reason: SDUPTHER

## 2022-05-04 ENCOUNTER — TELEPHONE (OUTPATIENT)
Dept: FAMILY MEDICINE CLINIC | Age: 72
End: 2022-05-04

## 2022-05-04 NOTE — TELEPHONE ENCOUNTER
Since Pt is on plavix Gilmer Henriquez needs to know if it is ok for them to five the Pt an Rx for Ibuprofen 800 mg. Please advise.

## 2022-05-05 NOTE — TELEPHONE ENCOUNTER
Attempted to call Kristopher Linton at [facility] two times. Is the call Back inform per     She can do Tylenol, and she can do Ibuprofen 200 mg 2-3 times a day for a week if needed, but she cannot do the 800 mg with the Plavix.

## 2022-05-09 DIAGNOSIS — M79.89 LEG SWELLING: ICD-10-CM

## 2022-05-09 DIAGNOSIS — K21.9 GASTROESOPHAGEAL REFLUX DISEASE WITHOUT ESOPHAGITIS: ICD-10-CM

## 2022-05-09 RX ORDER — ESOMEPRAZOLE MAGNESIUM 40 MG/1
40 CAPSULE, DELAYED RELEASE ORAL DAILY
Qty: 90 CAPSULE | Refills: 1 | Status: SHIPPED | OUTPATIENT
Start: 2022-05-09 | End: 2022-08-08 | Stop reason: SDUPTHER

## 2022-05-09 RX ORDER — FUROSEMIDE 20 MG/1
TABLET ORAL
Qty: 60 TABLET | Refills: 1 | Status: SHIPPED | OUTPATIENT
Start: 2022-05-09 | End: 2022-08-04 | Stop reason: SDUPTHER

## 2022-05-09 RX ORDER — FLUTICASONE PROPIONATE 50 MCG
SPRAY, SUSPENSION (ML) NASAL
Qty: 16 G | Refills: 2 | Status: SHIPPED | OUTPATIENT
Start: 2022-05-09 | End: 2022-07-14 | Stop reason: SDUPTHER

## 2022-06-10 DIAGNOSIS — E78.5 HYPERLIPIDEMIA, UNSPECIFIED HYPERLIPIDEMIA TYPE: ICD-10-CM

## 2022-06-10 DIAGNOSIS — I25.83 CORONARY ARTERY DISEASE DUE TO LIPID RICH PLAQUE: ICD-10-CM

## 2022-06-10 DIAGNOSIS — I25.10 CORONARY ARTERY DISEASE DUE TO LIPID RICH PLAQUE: ICD-10-CM

## 2022-06-10 RX ORDER — ATENOLOL 50 MG/1
50 TABLET ORAL DAILY
Qty: 90 TABLET | Refills: 1 | Status: SHIPPED | OUTPATIENT
Start: 2022-06-10

## 2022-06-10 RX ORDER — SIMVASTATIN 40 MG/1
40 TABLET, FILM COATED ORAL
Qty: 90 TABLET | Refills: 1 | Status: SHIPPED | OUTPATIENT
Start: 2022-06-10

## 2022-07-14 ENCOUNTER — OFFICE VISIT (OUTPATIENT)
Dept: FAMILY MEDICINE CLINIC | Age: 72
End: 2022-07-14
Payer: MEDICARE

## 2022-07-14 VITALS
DIASTOLIC BLOOD PRESSURE: 70 MMHG | OXYGEN SATURATION: 96 % | HEIGHT: 61 IN | HEART RATE: 61 BPM | SYSTOLIC BLOOD PRESSURE: 124 MMHG | RESPIRATION RATE: 18 BRPM | WEIGHT: 165.8 LBS | BODY MASS INDEX: 31.3 KG/M2 | TEMPERATURE: 97.8 F

## 2022-07-14 DIAGNOSIS — I25.83 CORONARY ARTERY DISEASE DUE TO LIPID RICH PLAQUE: ICD-10-CM

## 2022-07-14 DIAGNOSIS — M25.561 RIGHT KNEE PAIN, UNSPECIFIED CHRONICITY: ICD-10-CM

## 2022-07-14 DIAGNOSIS — G89.4 CHRONIC PAIN SYNDROME: Primary | ICD-10-CM

## 2022-07-14 DIAGNOSIS — E78.5 HYPERLIPIDEMIA, UNSPECIFIED HYPERLIPIDEMIA TYPE: ICD-10-CM

## 2022-07-14 DIAGNOSIS — R21 FACIAL RASH: ICD-10-CM

## 2022-07-14 DIAGNOSIS — I25.10 CORONARY ARTERY DISEASE DUE TO LIPID RICH PLAQUE: ICD-10-CM

## 2022-07-14 DIAGNOSIS — F33.1 MODERATE EPISODE OF RECURRENT MAJOR DEPRESSIVE DISORDER (HCC): ICD-10-CM

## 2022-07-14 DIAGNOSIS — Z23 ENCOUNTER FOR IMMUNIZATION: ICD-10-CM

## 2022-07-14 DIAGNOSIS — J30.2 SEASONAL ALLERGIC RHINITIS, UNSPECIFIED TRIGGER: ICD-10-CM

## 2022-07-14 PROCEDURE — 99214 OFFICE O/P EST MOD 30 MIN: CPT | Performed by: FAMILY MEDICINE

## 2022-07-14 PROCEDURE — 90677 PCV20 VACCINE IM: CPT | Performed by: FAMILY MEDICINE

## 2022-07-14 PROCEDURE — G8536 NO DOC ELDER MAL SCRN: HCPCS | Performed by: FAMILY MEDICINE

## 2022-07-14 PROCEDURE — G0009 ADMIN PNEUMOCOCCAL VACCINE: HCPCS | Performed by: FAMILY MEDICINE

## 2022-07-14 PROCEDURE — G8752 SYS BP LESS 140: HCPCS | Performed by: FAMILY MEDICINE

## 2022-07-14 PROCEDURE — G8754 DIAS BP LESS 90: HCPCS | Performed by: FAMILY MEDICINE

## 2022-07-14 PROCEDURE — G9717 DOC PT DX DEP/BP F/U NT REQ: HCPCS | Performed by: FAMILY MEDICINE

## 2022-07-14 PROCEDURE — G8417 CALC BMI ABV UP PARAM F/U: HCPCS | Performed by: FAMILY MEDICINE

## 2022-07-14 PROCEDURE — G8400 PT W/DXA NO RESULTS DOC: HCPCS | Performed by: FAMILY MEDICINE

## 2022-07-14 PROCEDURE — G8427 DOCREV CUR MEDS BY ELIG CLIN: HCPCS | Performed by: FAMILY MEDICINE

## 2022-07-14 PROCEDURE — 1101F PT FALLS ASSESS-DOCD LE1/YR: CPT | Performed by: FAMILY MEDICINE

## 2022-07-14 PROCEDURE — 1090F PRES/ABSN URINE INCON ASSESS: CPT | Performed by: FAMILY MEDICINE

## 2022-07-14 PROCEDURE — 3017F COLORECTAL CA SCREEN DOC REV: CPT | Performed by: FAMILY MEDICINE

## 2022-07-14 PROCEDURE — 1123F ACP DISCUSS/DSCN MKR DOCD: CPT | Performed by: FAMILY MEDICINE

## 2022-07-14 RX ORDER — FLUTICASONE PROPIONATE 50 MCG
SPRAY, SUSPENSION (ML) NASAL
Qty: 16 G | Refills: 2 | Status: SHIPPED | OUTPATIENT
Start: 2022-07-14

## 2022-07-14 RX ORDER — GABAPENTIN 300 MG/1
CAPSULE ORAL
Qty: 90 CAPSULE | Refills: 2 | Status: SHIPPED | OUTPATIENT
Start: 2022-07-14 | End: 2022-10-17 | Stop reason: SDUPTHER

## 2022-07-14 RX ORDER — TRIAMCINOLONE ACETONIDE 1 MG/G
CREAM TOPICAL 2 TIMES DAILY
Qty: 45 G | Refills: 0 | Status: SHIPPED | OUTPATIENT
Start: 2022-07-14

## 2022-07-14 NOTE — PROGRESS NOTES
1. Have you been to the ER, urgent care clinic since your last visit? Hospitalized since your last visit? No    2. Have you seen or consulted any other health care providers outside of the [address] [place] [address] since your last visit? Including any pap smears or colon screening.  No      Health Maintenance Due   Topic Date Due    COVID-19 Vaccine (1) Never done    DTaP/Tdap/Td series (1 - Tdap) Never done    Shingrix Vaccine Age 50> (1 of 2) Never done    Bone Densitometry (Dexa) Screening  Never done    Breast Cancer Screen Mammogram  05/20/2017    Colorectal Cancer Screening Combo  12/02/2020    Pneumococcal 65+ years (2 - PPSV23 or PCV20) 10/18/2021

## 2022-07-25 DIAGNOSIS — I25.10 CORONARY ARTERY DISEASE DUE TO LIPID RICH PLAQUE: ICD-10-CM

## 2022-07-25 DIAGNOSIS — I25.83 CORONARY ARTERY DISEASE DUE TO LIPID RICH PLAQUE: ICD-10-CM

## 2022-07-25 RX ORDER — CLOPIDOGREL BISULFATE 75 MG/1
TABLET ORAL
Qty: 90 TABLET | Refills: 1 | Status: SHIPPED | OUTPATIENT
Start: 2022-07-25

## 2022-07-25 NOTE — TELEPHONE ENCOUNTER
----- Message from Princess Zarco sent at 7/25/2022 10:15 AM EDT -----  Subject: Refill Request    QUESTIONS  Name of Medication? clopidogreL (PLAVIX) 75 mg tab  Patient-reported dosage and instructions? once a day   How many days do you have left? 6  Preferred Pharmacy? [address] phone number (if available)? [phone]  ---------------------------------------------------------------------------  --------------,  Name of Medication? furosemide (LASIX) 20 mg tablet  Patient-reported dosage and instructions? once a day   How many days do you have left? 6  Preferred Pharmacy? [address] phone number (if available)? [phone]  ---------------------------------------------------------------------------  --------------,  Name of Medication? Other - inhaler  Patient-reported dosage and instructions? twice a day   How many days do you have left? 0  Preferred Pharmacy? [address] phone number (if available)? [phone]  ---------------------------------------------------------------------------  --------------  [place] Thaddeus INFO  What is the best way for the office to contact you? OK to leave message on   voicemail  Preferred Call Back Phone Number? [phone]  ---------------------------------------------------------------------------  --------------  SCRIPT ANSWERS  Relationship to Patient?  Self

## 2022-08-04 DIAGNOSIS — M79.89 LEG SWELLING: ICD-10-CM

## 2022-08-04 DIAGNOSIS — J01.90 ACUTE BACTERIAL RHINOSINUSITIS: ICD-10-CM

## 2022-08-04 DIAGNOSIS — B96.89 ACUTE BACTERIAL RHINOSINUSITIS: ICD-10-CM

## 2022-08-04 RX ORDER — ALBUTEROL SULFATE 90 UG/1
2 AEROSOL, METERED RESPIRATORY (INHALATION)
Qty: 1 EACH | Refills: 3 | Status: SHIPPED | OUTPATIENT
Start: 2022-08-04

## 2022-08-04 RX ORDER — FUROSEMIDE 20 MG/1
TABLET ORAL
Qty: 60 TABLET | Refills: 1 | Status: SHIPPED | OUTPATIENT
Start: 2022-08-04

## 2022-08-08 ENCOUNTER — TELEPHONE (OUTPATIENT)
Dept: FAMILY MEDICINE CLINIC | Age: 72
End: 2022-08-08

## 2022-08-08 DIAGNOSIS — K21.9 GASTROESOPHAGEAL REFLUX DISEASE WITHOUT ESOPHAGITIS: ICD-10-CM

## 2022-08-08 RX ORDER — ESOMEPRAZOLE MAGNESIUM 40 MG/1
40 CAPSULE, DELAYED RELEASE ORAL DAILY
Qty: 90 CAPSULE | Refills: 1 | Status: SHIPPED | OUTPATIENT
Start: 2022-08-08

## 2022-08-08 RX ORDER — AMOXICILLIN AND CLAVULANATE POTASSIUM 875; 125 MG/1; MG/1
1 TABLET, FILM COATED ORAL EVERY 12 HOURS
Qty: 14 TABLET | Refills: 0 | Status: SHIPPED | OUTPATIENT
Start: 2022-08-08 | End: 2022-08-15

## 2022-08-08 NOTE — TELEPHONE ENCOUNTER
Call returned to pt whom stated she is still having the same sinus issues from  last visit. States that it getting worst at night and waking her up. Having some wheezing.

## 2022-08-08 NOTE — TELEPHONE ENCOUNTER
Patient reports that her sinus congestion and nasal pressure worsening over the last 2 weeks with increased sputum production as well. Denies any sick contacts. Given length of time with try Augmentin.     MD LINA Darden & AVELINA REDMAN [facility]  08/08/22

## 2022-08-16 ENCOUNTER — TELEPHONE (OUTPATIENT)
Dept: FAMILY MEDICINE CLINIC | Age: 72
End: 2022-08-16

## 2022-08-16 NOTE — TELEPHONE ENCOUNTER
Dr. Verenice Luke told Pt to call back if the antibiotic for congestion in her chest and in her head did not stop. Whistling in her ears when she coughs. He talked to her last Monday night. It is not working. She is still having congestion. Coughing spells in the middle of the night. Not able to bring it up. Gets stuck in her throat. Been using her inhaler to breathe. Please call Pt.

## 2022-08-17 ENCOUNTER — NURSE TRIAGE (OUTPATIENT)
Dept: OTHER | Facility: CLINIC | Age: 72
End: 2022-08-17

## 2022-08-17 ENCOUNTER — TELEPHONE (OUTPATIENT)
Dept: FAMILY MEDICINE CLINIC | Age: 72
End: 2022-08-17

## 2022-08-17 NOTE — TELEPHONE ENCOUNTER
PT states she would like an appt with x-ray of her chest done. I do not see any openings until next week. Please advise if Pt can be fit onto another Dr's schedule for today.

## 2022-08-17 NOTE — TELEPHONE ENCOUNTER
Received call from Bruna rivas at [facility] with Red Flag Complaint. Subjective: Caller states \"was on medication for congestion, is not getting any better and having sob. \"     Current Symptoms: was on antibiotic for sinus infection, finished yesterday  Still not any better  Now also wheezing  At night will have a bad cough  Congestion in chest and wheezing in ears and chest    Onset: 2 days ago; worsening    Associated Symptoms: NA    Pain Severity: 6/10; ; constant-sinus pressure    Temperature: denies     What has been tried: using inhaler    LMP: NA Pregnant: NA    Recommended disposition: Go to Office Now    Care advice provided, patient verbalizes understanding; denies any other questions or concerns; instructed to call back for any new or worsening symptoms. Patient/Caller agrees with recommended disposition; writer provided warm transfer to Susan Castellon at [facility] for appointment scheduling    Attention Provider: Thank you for allowing me to participate in the care of your patient. The patient was connected to triage in response to information provided to the [facility]. Please do not respond through this encounter as the response is not directed to a shared pool.     Reason for Disposition   MILD difficulty breathing (e.g., minimal/no SOB at rest, SOB with walking, pulse < 100) of new-onset or worse than normal    Protocols used: Breathing Difficulty-ADULT-[place]

## 2022-08-22 ENCOUNTER — OFFICE VISIT (OUTPATIENT)
Dept: FAMILY MEDICINE CLINIC | Age: 72
End: 2022-08-22
Payer: MEDICARE

## 2022-08-22 ENCOUNTER — NURSE TRIAGE (OUTPATIENT)
Dept: OTHER | Facility: CLINIC | Age: 72
End: 2022-08-22

## 2022-08-22 VITALS
HEART RATE: 67 BPM | HEIGHT: 61 IN | DIASTOLIC BLOOD PRESSURE: 77 MMHG | RESPIRATION RATE: 18 BRPM | TEMPERATURE: 97.2 F | BODY MASS INDEX: 30.96 KG/M2 | SYSTOLIC BLOOD PRESSURE: 132 MMHG | OXYGEN SATURATION: 97 % | WEIGHT: 164 LBS

## 2022-08-22 DIAGNOSIS — R05.1 ACUTE COUGH: Primary | ICD-10-CM

## 2022-08-22 DIAGNOSIS — R06.2 WHEEZING: ICD-10-CM

## 2022-08-22 DIAGNOSIS — N89.8 VAGINAL IRRITATION: ICD-10-CM

## 2022-08-22 PROCEDURE — 1123F ACP DISCUSS/DSCN MKR DOCD: CPT | Performed by: FAMILY MEDICINE

## 2022-08-22 PROCEDURE — G8536 NO DOC ELDER MAL SCRN: HCPCS | Performed by: FAMILY MEDICINE

## 2022-08-22 PROCEDURE — G8427 DOCREV CUR MEDS BY ELIG CLIN: HCPCS | Performed by: FAMILY MEDICINE

## 2022-08-22 PROCEDURE — 3017F COLORECTAL CA SCREEN DOC REV: CPT | Performed by: FAMILY MEDICINE

## 2022-08-22 PROCEDURE — G8400 PT W/DXA NO RESULTS DOC: HCPCS | Performed by: FAMILY MEDICINE

## 2022-08-22 PROCEDURE — G8754 DIAS BP LESS 90: HCPCS | Performed by: FAMILY MEDICINE

## 2022-08-22 PROCEDURE — 1090F PRES/ABSN URINE INCON ASSESS: CPT | Performed by: FAMILY MEDICINE

## 2022-08-22 PROCEDURE — 99214 OFFICE O/P EST MOD 30 MIN: CPT | Performed by: FAMILY MEDICINE

## 2022-08-22 PROCEDURE — 1101F PT FALLS ASSESS-DOCD LE1/YR: CPT | Performed by: FAMILY MEDICINE

## 2022-08-22 PROCEDURE — G8752 SYS BP LESS 140: HCPCS | Performed by: FAMILY MEDICINE

## 2022-08-22 PROCEDURE — G9717 DOC PT DX DEP/BP F/U NT REQ: HCPCS | Performed by: FAMILY MEDICINE

## 2022-08-22 PROCEDURE — G8417 CALC BMI ABV UP PARAM F/U: HCPCS | Performed by: FAMILY MEDICINE

## 2022-08-22 RX ORDER — FLUCONAZOLE 150 MG/1
150 TABLET ORAL DAILY
Qty: 1 TABLET | Refills: 0 | Status: SHIPPED | OUTPATIENT
Start: 2022-08-22 | End: 2022-08-23

## 2022-08-22 RX ORDER — BENZONATATE 200 MG/1
200 CAPSULE ORAL
Qty: 60 CAPSULE | Refills: 1 | Status: SHIPPED | OUTPATIENT
Start: 2022-08-22

## 2022-08-22 NOTE — TELEPHONE ENCOUNTER
Received call from Felisha rodriguez at [facility] with Red Flag Complaint. Subjective: Caller states \"I'm still having chest congestion. Finished a medication on Tuesday. \"     Current Symptoms: coughing- worse at night time- needs to sit up     Onset: 2 weeks ago; worsening    Associated Symptoms:     Pain Severity:  \"tightness\" in chest when coughing    Temperature: Unsure- last temperature was 98.6    What has been tried: Augmentin prescription finished on Tuesday. Using rescue inhaler. Using 4X a day- helps with tightness in chest.    Recommended disposition: See in Office Today    Patient wanting xray. Would like to rule out pneumonia. Understands that OV is needed to speak to provider to come up with plan. Care advice provided, patient verbalizes understanding; denies any other questions or concerns; instructed to call back for any new or worsening symptoms. Patient/Caller agrees with recommended disposition; writer provided warm transfer to Alize Whiting at [facility] for appointment scheduling    Attention Provider: Thank you for allowing me to participate in the care of your patient. The patient was connected to triage in response to information provided to the [facility]. Please do not respond through this encounter as the response is not directed to a shared pool.     Reason for Disposition   SEVERE coughing spells (e.g., whooping sound after coughing, vomiting after coughing)    Protocols used: Cough-ADULT-[place]

## 2022-08-22 NOTE — PROGRESS NOTES
[facility]    History of Present Illness:   Silvana Miles is a 81 y.o. female with history of CAD, HTN, GERD, Anxiety, HLD, Blindness, insomnia, Depression  CC: Cough  History provided by patient and Records    HPI:  Cough Evaluation:  Patient complaining of Acute cough. - Duration: 2 weeks  - Frequency: Intermittent during the day. Cough is worst during the night. - Quality: productive  - Severity: moderate  - Associated Symptoms: Loose stools, some itching  - Known Triggers: nothing  - Alleviating factors: none  - Current Medications: none    - Pulmonary History:  Bronchitis  - Is patient on ACE-I? No   - History of CHF? No   - History of GERD? Yes  - History of Post Nasal Drip? No   - Current Tobacco use: None  - History of toxin exposures? No   - Previous imaging studies: None     Health Maintenance  Health Maintenance Due   Topic Date Due    COVID-19 Vaccine (1) Never done    DTaP/Tdap/Td series (1 - Tdap) Never done    Shingrix Vaccine Age 50> (1 of 2) Never done    Bone Densitometry (Dexa) Screening  Never done    Breast Cancer Screen Mammogram  05/20/2017    Colorectal Cancer Screening Combo  12/02/2020       Past Medical, Family, and Social History:     Current Outpatient Medications on File Prior to Visit   Medication Sig Dispense Refill    esomeprazole (NEXIUM) 40 mg capsule Take 1 Capsule by mouth in the morning. 90 Capsule 1    furosemide (LASIX) 20 mg tablet Take 1 tablet daily as needed for swelling 60 Tablet 1    albuterol (PROVENTIL HFA, VENTOLIN HFA, PROAIR HFA) 90 mcg/actuation inhaler Take 2 Puffs by inhalation every four (4) hours as needed for Wheezing.  1 Each 3    clopidogreL (PLAVIX) 75 mg tab TAKE ONE TABLET BY MOUTH ONCE A DAY 90 Tablet 1    gabapentin (NEURONTIN) 300 mg capsule TAKE ONE CAPSULE BY MOUTH 3 TIMES A DAY AS NEEDED FOR PAIN  Indications: \"change of life\" signs 90 Capsule 2    fluticasone propionate (FLONASE) 50 mcg/actuation nasal spray USE 2 SPRAYS IN BOTH NOSTRILS DAILY 16 g 2    triamcinolone acetonide (KENALOG) 0.1 % topical cream Apply  to affected area two (2) times a day. use thin layer to face and neck 45 g 0    simvastatin (ZOCOR) 40 mg tablet Take 1 Tablet by mouth nightly. 90 Tablet 1    atenoloL (TENORMIN) 50 mg tablet Take 1 Tablet by mouth daily. 90 Tablet 1    nitroglycerin (NITROSTAT) 0.4 mg SL tablet 1 Tablet by SubLINGual route as needed for Chest Pain. 25 Each 0    aspirin 81 mg tablet Take 81 mg by mouth. No current facility-administered medications on file prior to visit. Patient Active Problem List   Diagnosis Code    Anxiety F41.9    Hyperlipidemia E78.5    CAD (coronary artery disease) I25.10    Blindness H54.7    HTN (hypertension) I10    Osteopenia M85.80    B12 deficiency E53.8    Insomnia G47.00    Allergic rhinitis J30.9    Gastroesophageal reflux disease without esophagitis K21.9    Moderate episode of recurrent major depressive disorder (HCC) F33.1    Controlled substance agreement signed Z79.899    Chronic pain syndrome G89.4       Social History     Socioeconomic History    Marital status:    Tobacco Use    Smoking status: Never    Smokeless tobacco: Never   Substance and Sexual Activity    Alcohol use: Yes     Comment: social    Drug use: No    Sexual activity: Yes     Partners: Male     Birth control/protection: None     Social Determinants of Health     Financial Resource Strain: Low Risk     Difficulty of Paying Living Expenses: Not hard at all   Food Insecurity: No Food Insecurity    Worried About Running Out of Food in the Last Year: Never true    Ran Out of Food in the Last Year: Never true        Review of Systems   Review of Systems   Constitutional:  Positive for malaise/fatigue. Negative for chills and fever. Respiratory:  Positive for cough, sputum production and wheezing. Gastrointestinal:  Negative for abdominal pain, nausea and vomiting. Musculoskeletal:  Positive for myalgias. Neurological:  Negative for dizziness, tingling and headaches. Objective:   Visit Vitals  /77 (BP 1 Location: Left upper arm, BP Patient Position: Sitting, BP Cuff Size: Adult)   Pulse 67   Temp 97.2 °F (36.2 °C) (Oral)   Resp 18   Ht 5' 1\" (1.549 m)   Wt 164 lb (74.4 kg)   SpO2 97%   BMI 30.99 kg/m²        Physical Exam  Vitals reviewed. Constitutional:       Appearance: Normal appearance. Cardiovascular:      Rate and Rhythm: Normal rate and regular rhythm. Pulses: Normal pulses. Heart sounds: Normal heart sounds. No murmur heard. No friction rub. No gallop. Pulmonary:      Effort: Pulmonary effort is normal.      Breath sounds: Wheezing present. Comments: Reduced air sounds on the left lower lung area. Abdominal:      General: Abdomen is flat. Bowel sounds are normal.      Palpations: Abdomen is soft. Musculoskeletal:         General: Normal range of motion. Cervical back: Normal range of motion and neck supple. Skin:     General: Skin is warm and dry. Neurological:      Mental Status: She is alert. Pertinent Labs/Studies:      Assessment and orders:       ICD-10-CM ICD-9-CM    1. Acute cough  R05.1 786.2 benzonatate (TESSALON) 200 mg capsule      XR CHEST PA LAT      2. Wheezing  R06.2 786.07 XR CHEST PA LAT        Diagnoses and all orders for this visit:    1. Acute cough: Evaluation now for possible Pneumonia or Persistent bronchitis issue. Tessalon now. -     benzonatate (TESSALON) 200 mg capsule; Take 1 Capsule by mouth three (3) times daily as needed for Cough. -     XR CHEST PA LAT; Future    2. Wheezing  -     XR CHEST PA LAT; Future    Follow-up and Dispositions    Return in about 3 months (around 11/22/2022), or if symptoms worsen or fail to improve. I have discussed the diagnosis with the patient and the intended plan as seen in the above orders. Social history, medical history, and labs were reviewed.   The patient has received an after-visit summary and questions were answered concerning future plans. I have discussed medication side effects and warnings with the patient as well.     MD LINA Magallanes & AVELINA REDMAN [facility]  08/22/22

## 2022-08-22 NOTE — PROGRESS NOTES
Chief Complaint   Patient presents with    Cough     Productive, dyspnea on exertion. 2 weeks. 1. \"Have you been to the ER, urgent care clinic since your last visit? Hospitalized since your last visit? \" No    2. \"Have you seen or consulted any other health care providers outside of the [address] [place] [address] since your last visit? \" No     3. For patients aged 39-70: Has the patient had a colonoscopy / FIT/ Cologuard? No      If the patient is female:    4. For patients aged 41-77: Has the patient had a mammogram within the past 2 years? No      5. For patients aged 21-65: Has the patient had a pap smear?  No    Health Maintenance Due   Topic Date Due    COVID-19 Vaccine (1) Never done    DTaP/Tdap/Td series (1 - Tdap) Never done    Shingrix Vaccine Age 50> (1 of 2) Never done    Bone Densitometry (Dexa) Screening  Never done    Breast Cancer Screen Mammogram  05/20/2017    Colorectal Cancer Screening Combo  12/02/2020

## 2022-08-23 ENCOUNTER — TELEPHONE (OUTPATIENT)
Dept: FAMILY MEDICINE CLINIC | Age: 72
End: 2022-08-23

## 2022-08-23 RX ORDER — PREDNISONE 20 MG/1
40 TABLET ORAL
Qty: 10 TABLET | Refills: 0 | Status: SHIPPED | OUTPATIENT
Start: 2022-08-23 | End: 2022-08-28

## 2022-08-23 NOTE — TELEPHONE ENCOUNTER
Reviewed XR, no sign of Pneumonia, will start Prednisone, patient to also start Diflucan and Tessalon.     MD LINA Mckinney & AVELINA REDMAN [facility]  08/23/22

## 2022-08-23 NOTE — TELEPHONE ENCOUNTER
Call placed to pt and informed per  that it was not pneumonia. Continue with the diflucan, tessalon and he was calling in prednisone. Pt verbalized understanding.

## 2022-08-23 NOTE — TELEPHONE ENCOUNTER
Pt is calling back about her results. She said Dr. Yovany Vazquez was going to call her in something also for her pneumonia if that is what she has. Please call Pt because I see two scripts but do not know if either is what is needed from the x-ray results.  Thanks

## 2022-10-17 DIAGNOSIS — G89.4 CHRONIC PAIN SYNDROME: ICD-10-CM

## 2022-10-17 DIAGNOSIS — M25.561 RIGHT KNEE PAIN, UNSPECIFIED CHRONICITY: ICD-10-CM

## 2022-10-17 RX ORDER — GABAPENTIN 300 MG/1
CAPSULE ORAL
Qty: 90 CAPSULE | Refills: 2 | Status: SHIPPED | OUTPATIENT
Start: 2022-10-17

## 2022-11-16 ENCOUNTER — TELEPHONE (OUTPATIENT)
Dept: FAMILY MEDICINE CLINIC | Age: 72
End: 2022-11-16

## 2022-11-16 DIAGNOSIS — R05.1 ACUTE COUGH: ICD-10-CM

## 2022-11-16 RX ORDER — BENZONATATE 200 MG/1
200 CAPSULE ORAL
Qty: 60 CAPSULE | Refills: 1 | Status: SHIPPED | OUTPATIENT
Start: 2022-11-16

## 2022-11-16 RX ORDER — PREDNISONE 20 MG/1
20 TABLET ORAL
Qty: 7 TABLET | Refills: 0 | Status: SHIPPED | OUTPATIENT
Start: 2022-11-16

## 2022-11-16 NOTE — TELEPHONE ENCOUNTER
Patient noting she has had issues with Wheezing over the last 4 weeks. Had been on AMoxicillin until recently with no change in symptoms. Patient has been using Inhaler with some improvement. Improves during the day with movement. Worse during the night. Some coughing present.   Trial of Steroid and Tesslaon now    MD LINA Gonzalez & AVELINA REDMAN [facility]  11/16/22

## 2022-11-16 NOTE — TELEPHONE ENCOUNTER
----- Message from Favim sent at 11/16/2022  8:43 AM EST -----  Subject: Message to Provider    QUESTIONS  Information for Provider? PT is experiencing wheezing w/ breathing. Requesting an antibiotic be called in.  ---------------------------------------------------------------------------  --------------  Ann Perez INFO  [phone]; OK to leave message on voicemail  ---------------------------------------------------------------------------  --------------  SCRIPT ANSWERS  Relationship to Patient?  Self

## 2022-11-16 NOTE — TELEPHONE ENCOUNTER
----- Message from Inez Bah sent at 11/16/2022  8:43 AM EST -----  Subject: Message to Provider    QUESTIONS  Information for Provider? PT is experiencing wheezing w/ breathing. Requesting an antibiotic be called in.  ---------------------------------------------------------------------------  --------------  Deo GREWAL  [phone]; OK to leave message on voicemail  ---------------------------------------------------------------------------  --------------  SCRIPT ANSWERS  Relationship to Patient?  Self

## 2022-11-28 ENCOUNTER — OFFICE VISIT (OUTPATIENT)
Dept: FAMILY MEDICINE CLINIC | Age: 72
End: 2022-11-28
Payer: MEDICARE

## 2022-11-28 VITALS
HEIGHT: 61 IN | DIASTOLIC BLOOD PRESSURE: 74 MMHG | RESPIRATION RATE: 18 BRPM | TEMPERATURE: 97.4 F | SYSTOLIC BLOOD PRESSURE: 155 MMHG | WEIGHT: 165 LBS | OXYGEN SATURATION: 97 % | HEART RATE: 55 BPM | BODY MASS INDEX: 31.15 KG/M2

## 2022-11-28 DIAGNOSIS — I10 PRIMARY HYPERTENSION: ICD-10-CM

## 2022-11-28 DIAGNOSIS — Z00.00 MEDICARE ANNUAL WELLNESS VISIT, SUBSEQUENT: Primary | ICD-10-CM

## 2022-11-28 DIAGNOSIS — Z71.89 ADVANCED DIRECTIVES, COUNSELING/DISCUSSION: ICD-10-CM

## 2022-11-28 DIAGNOSIS — G89.4 CHRONIC PAIN SYNDROME: ICD-10-CM

## 2022-11-28 DIAGNOSIS — E78.5 HYPERLIPIDEMIA, UNSPECIFIED HYPERLIPIDEMIA TYPE: ICD-10-CM

## 2022-11-28 DIAGNOSIS — I25.10 CORONARY ARTERY DISEASE DUE TO LIPID RICH PLAQUE: ICD-10-CM

## 2022-11-28 DIAGNOSIS — H54.3 BLINDNESS OF BOTH EYES: ICD-10-CM

## 2022-11-28 DIAGNOSIS — F33.1 MODERATE EPISODE OF RECURRENT MAJOR DEPRESSIVE DISORDER (HCC): ICD-10-CM

## 2022-11-28 DIAGNOSIS — I25.83 CORONARY ARTERY DISEASE DUE TO LIPID RICH PLAQUE: ICD-10-CM

## 2022-11-28 PROCEDURE — G8400 PT W/DXA NO RESULTS DOC: HCPCS | Performed by: FAMILY MEDICINE

## 2022-11-28 PROCEDURE — 1090F PRES/ABSN URINE INCON ASSESS: CPT | Performed by: FAMILY MEDICINE

## 2022-11-28 PROCEDURE — 1123F ACP DISCUSS/DSCN MKR DOCD: CPT | Performed by: FAMILY MEDICINE

## 2022-11-28 PROCEDURE — G8427 DOCREV CUR MEDS BY ELIG CLIN: HCPCS | Performed by: FAMILY MEDICINE

## 2022-11-28 PROCEDURE — G0439 PPPS, SUBSEQ VISIT: HCPCS | Performed by: FAMILY MEDICINE

## 2022-11-28 PROCEDURE — G8753 SYS BP > OR = 140: HCPCS | Performed by: FAMILY MEDICINE

## 2022-11-28 PROCEDURE — 99214 OFFICE O/P EST MOD 30 MIN: CPT | Performed by: FAMILY MEDICINE

## 2022-11-28 PROCEDURE — 1101F PT FALLS ASSESS-DOCD LE1/YR: CPT | Performed by: FAMILY MEDICINE

## 2022-11-28 PROCEDURE — G8536 NO DOC ELDER MAL SCRN: HCPCS | Performed by: FAMILY MEDICINE

## 2022-11-28 PROCEDURE — 3078F DIAST BP <80 MM HG: CPT | Performed by: FAMILY MEDICINE

## 2022-11-28 PROCEDURE — 3074F SYST BP LT 130 MM HG: CPT | Performed by: FAMILY MEDICINE

## 2022-11-28 PROCEDURE — G8417 CALC BMI ABV UP PARAM F/U: HCPCS | Performed by: FAMILY MEDICINE

## 2022-11-28 PROCEDURE — G8754 DIAS BP LESS 90: HCPCS | Performed by: FAMILY MEDICINE

## 2022-11-28 PROCEDURE — 3017F COLORECTAL CA SCREEN DOC REV: CPT | Performed by: FAMILY MEDICINE

## 2022-11-28 PROCEDURE — G9717 DOC PT DX DEP/BP F/U NT REQ: HCPCS | Performed by: FAMILY MEDICINE

## 2022-11-28 RX ORDER — GABAPENTIN 100 MG/1
100 CAPSULE ORAL
Qty: 90 CAPSULE | Refills: 1 | Status: SHIPPED | OUTPATIENT
Start: 2022-11-28

## 2022-11-28 NOTE — PROGRESS NOTES
[facility]    History of Present Illness:   Suly Antony is a 81 y.o. female with history of CAD, HTN, GERD, Anxiety, HLD, Blindness, insomnia, Depression  CC: Cough  History provided by patient and Records    HPI:  Pain Assessment inventory: Onset of Symptoms: Many years ago  Description: Pain Generally related to body aches and move around. Taking 300 mg nightly at this time and would like to wean off if possible. Frequency: Daily  Pain Scale:(1-10): Mild to Moderate  Hx of similar symptoms: Yes  Radiation: Radiation to the legs  Duration:  continuous    Gastroesophageal Reflux:  Current control of Symptoms: Stable  Primary symptoms: dysphagia  Hiatal Hernia: No  Current Medications: Nexium  The patient has no history melena or bright red blood in the stools. The patient avoids high dose aspirin and NSAID therapy. The patient is aware of diet changes needed, elevating the head of the bed and appropriate use of antacids. CAD: Patient is taking Plavix at this time. States overall controlled. Health Maintenance  Health Maintenance Due   Topic Date Due    DTaP/Tdap/Td series (1 - Tdap) Never done    Shingrix Vaccine Age 50> (1 of 2) Never done    Bone Densitometry (Dexa) Screening  Never done    Breast Cancer Screen Mammogram  05/20/2017    Colorectal Cancer Screening Combo  12/02/2020    COVID-19 Vaccine (5 - Booster for [place] Most series) 06/16/2022    Flu Vaccine (1) 08/01/2022    Medicare Yearly Exam  11/23/2022       Past Medical, Family, and Social History:     Current Outpatient Medications on File Prior to Visit   Medication Sig Dispense Refill    predniSONE (DELTASONE) 20 mg tablet Take 1 Tablet by mouth daily (with breakfast). 7 Tablet 0    benzonatate (TESSALON) 200 mg capsule Take 1 Capsule by mouth three (3) times daily as needed for Cough. 60 Capsule 1    esomeprazole (NEXIUM) 40 mg capsule Take 1 Capsule by mouth in the morning.  90 Capsule 1    furosemide (LASIX) 20 mg tablet Take 1 tablet daily as needed for swelling 60 Tablet 1    albuterol (PROVENTIL HFA, VENTOLIN HFA, PROAIR HFA) 90 mcg/actuation inhaler Take 2 Puffs by inhalation every four (4) hours as needed for Wheezing. 1 Each 3    clopidogreL (PLAVIX) 75 mg tab TAKE ONE TABLET BY MOUTH ONCE A DAY 90 Tablet 1    fluticasone propionate (FLONASE) 50 mcg/actuation nasal spray USE 2 SPRAYS IN BOTH NOSTRILS DAILY 16 g 2    triamcinolone acetonide (KENALOG) 0.1 % topical cream Apply  to affected area two (2) times a day. use thin layer to face and neck 45 g 0    simvastatin (ZOCOR) 40 mg tablet Take 1 Tablet by mouth nightly. 90 Tablet 1    atenoloL (TENORMIN) 50 mg tablet Take 1 Tablet by mouth daily. 90 Tablet 1    nitroglycerin (NITROSTAT) 0.4 mg SL tablet 1 Tablet by SubLINGual route as needed for Chest Pain. 25 Each 0    aspirin 81 mg tablet Take 81 mg by mouth. [DISCONTINUED] gabapentin (NEURONTIN) 300 mg capsule TAKE ONE CAPSULE BY MOUTH 3 TIMES A DAY AS NEEDED FOR PAIN  Indications: \"change of life\" signs 90 Capsule 2     No current facility-administered medications on file prior to visit.        Patient Active Problem List   Diagnosis Code    Anxiety F41.9    Hyperlipidemia E78.5    CAD (coronary artery disease) I25.10    Blindness H54.7    HTN (hypertension) I10    Osteopenia M85.80    B12 deficiency E53.8    Insomnia G47.00    Allergic rhinitis J30.9    Gastroesophageal reflux disease without esophagitis K21.9    Moderate episode of recurrent major depressive disorder (HCC) F33.1    Controlled substance agreement signed Z79.899    Chronic pain syndrome G89.4       Social History     Socioeconomic History    Marital status:    Tobacco Use    Smoking status: Never    Smokeless tobacco: Never   Substance and Sexual Activity    Alcohol use: Yes     Comment: social    Drug use: No    Sexual activity: Yes     Partners: Male     Birth control/protection: None     Social Determinants of Health Financial Resource Strain: Low Risk     Difficulty of Paying Living Expenses: Not hard at all   Food Insecurity: No Food Insecurity    Worried About Running Out of Food in the Last Year: Never true    Ran Out of Food in the Last Year: Never true        Review of Systems   Review of Systems   Constitutional:  Negative for chills and fever. Cardiovascular:  Negative for chest pain and palpitations. Gastrointestinal:  Negative for nausea and vomiting. Neurological:  Negative for dizziness and headaches. Objective:   Visit Vitals  BP (!) 155/74 (BP 1 Location: Right arm, BP Patient Position: Sitting, BP Cuff Size: Adult)   Pulse (!) 55   Temp 97.4 °F (36.3 °C) (Oral)   Resp 18   Ht 5' 1\" (1.549 m)   Wt 165 lb (74.8 kg)   SpO2 97%   BMI 31.18 kg/m²        Physical Exam  Vitals and nursing note reviewed. Constitutional:       Appearance: Normal appearance. HENT:      Head: Normocephalic and atraumatic. Cardiovascular:      Rate and Rhythm: Normal rate and regular rhythm. Pulses: Normal pulses. Heart sounds: Normal heart sounds. No murmur heard. No friction rub. No gallop. Pulmonary:      Effort: Pulmonary effort is normal.      Breath sounds: Normal breath sounds. Abdominal:      General: Abdomen is flat. Bowel sounds are normal.      Palpations: Abdomen is soft. Musculoskeletal:      Cervical back: Normal range of motion and neck supple. Skin:     General: Skin is warm and dry. Neurological:      Mental Status: She is alert. Pertinent Labs/Studies:      Assessment and orders:       ICD-10-CM ICD-9-CM    1. Primary hypertension  I10 401.9       2. Coronary artery disease due to lipid rich plaque  I25.10 414.00     I25.83 414.3       3. Hyperlipidemia, unspecified hyperlipidemia type  E78.5 272.4       4. Blindness of both eyes  H54.3 369.00       5. Moderate episode of recurrent major depressive disorder (HCC)  F33.1 296.32       6.  Chronic pain syndrome  G89.4 338.4 gabapentin (NEURONTIN) 100 mg capsule        Diagnoses and all orders for this visit:    1. Primary hypertension: Our goal is to normalize the blood pressure to decrease the risks of strokes and heart attacks. The patient is in agreement with the plan. 2. Coronary artery disease due to lipid rich plaque    3. Hyperlipidemia, unspecified hyperlipidemia type: The patient is aware of our goal to reduce or eliminate the long term problems (such as strokes and heart attacks) related to poorly controlled Triglycerides, LDL, Cholesterol. 4. Blindness of both eyes    5. Moderate episode of recurrent major depressive disorder ([facility] [address]    6. Chronic pain syndrome: Reducing Gabapentin slowly  -     gabapentin (NEURONTIN) 100 mg capsule; Take 1 Capsule by mouth three (3) times daily as needed for Pain. Max Daily Amount: 300 mg. I have discussed the diagnosis with the patient and the intended plan as seen in the above orders. Social history, medical history, and labs were reviewed. The patient has received an after-visit summary and questions were answered concerning future plans. I have discussed medication side effects and warnings with the patient as well.     MD LINA Romo & AVELINA REDMAN [facility]  11/28/22

## 2022-11-28 NOTE — PROGRESS NOTES
This is the Subsequent Medicare Annual Wellness Exam, performed 12 months or more after the Initial AWV or the last Subsequent AWV    I have reviewed the patient's medical history in detail and updated the computerized patient record. 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- [facility]- has GRIFFIN      Past Surgical History:   Procedure Laterality Date    HX ORTHOPAEDIC      PA CABG, VEIN, FIVE  2006    PA CARDIAC SURG PROCEDURE UNLIST       Allergies   Allergen Reactions    Latex Rash    Percocet [Oxycodone-Acetaminophen] Itching     Family History   Problem Relation Age of Onset    No Known Problems Mother     No Known Problems Father      Social History     Tobacco Use    Smoking status: Never    Smokeless tobacco: Never   Substance Use Topics    Alcohol use: Yes     Comment: social     Depression Risk Factor Screening:     3 most recent PHQ Screens 11/28/2022   PHQ Not Done -   Little interest or pleasure in doing things Not at all   Feeling down, depressed, irritable, or hopeless Not at all   Total Score PHQ 2 0     Alcohol Risk Factor Screening:    Do you average more than 1 drink per night or more than 7 drinks a week: No    In the past three months have you have had more than 4 drinks containing alcohol on one occasion: No  Functional Ability and Level of Safety:   Hearing Loss  Hearing is good. Activities of Daily Living  The home contains: handrails and grab bars  Patient needs help with:  transportation, shopping, housework, and walking  No flowsheet data found. Ambulation: with mild difficulty    Fall Risk  Fall Risk Assessment, last 12 mths 11/28/2022   Able to walk? Yes   Fall in past 12 months? 0   Do you feel unsteady? 0   Are you worried about falling 0   Is TUG test greater than 12 seconds?  -   Is the gait abnormal? -   Number of falls in past 12 months -   Fall with injury? -       Abuse Screen  Abuse Screening Questionnaire 2/28/2022   Do you ever feel afraid of your partner? N   Are you in a relationship with someone who physically or mentally threatens you? N   Is it safe for you to go home? Y     Cognitive Screening   Evaluation of Cognitive Function:  Has your family/caregiver stated any concerns about your memory: no  Normal  No flowsheet data found. Patient Care Team   Patient Care Team:  Davey Husain MD as PCP - General (Family Medicine)  Davey Husain MD as PCP - [facility] Empaneled Provider  George Ibrahim MD (Cardiovascular Disease Physician)  Carlos Stearns MD (Orthopedic Surgery)  Assessment/Plan   Education and counseling provided:  Are appropriate based on today's review and evaluation    Diagnoses and all orders for this visit:    1. Medicare annual wellness visit, subsequent    2. Primary hypertension    3. Coronary artery disease due to lipid rich plaque    4. Hyperlipidemia, unspecified hyperlipidemia type    5. Blindness of both eyes    6. Moderate episode of recurrent major depressive disorder ([facility] [address]    7. Chronic pain syndrome  -     gabapentin (NEURONTIN) 100 mg capsule; Take 1 Capsule by mouth three (3) times daily as needed for Pain.  Max Daily Amount: 300 mg.    8. Advanced directives, counseling/discussion  -     DO NOT RESUSCITATE      Health Maintenance Topics with due status: Overdue       Topic Date Due    DTaP/Tdap/Td series Never done    Shingrix Vaccine Age 50> Never done    Bone Densitometry (Dexa) Screening Never done    Breast Cancer Screen Mammogram 05/20/2017    Colorectal Cancer Screening Combo 12/02/2020    COVID-19 Vaccine 06/16/2022    Flu Vaccine 08/01/2022     Health Maintenance Topics with due status: Not Due       Topic Last Completion Date    Lipid Screen 02/28/2022    Depression Monitoring 08/22/2022    Medicare Yearly Exam 11/28/2022     Health Maintenance Topics with due status: Completed       Topic Last Completion Date    Hepatitis C Screening 07/15/2015    Pneumococcal 65+ years 07/14/2022

## 2022-11-28 NOTE — PROGRESS NOTES
1. Have you been to the ER, urgent care clinic since your last visit? Hospitalized since your last visit? No    2. Have you seen or consulted any other health care providers outside of the [address] [place] [address] since your last visit? Including any pap smears or colon screening.  No      Health Maintenance Due   Topic Date Due    DTaP/Tdap/Td series (1 - Tdap) Never done    Shingrix Vaccine Age 50> (1 of 2) Never done    Bone Densitometry (Dexa) Screening  Never done    Breast Cancer Screen Mammogram  05/20/2017    Colorectal Cancer Screening Combo  12/02/2020    COVID-19 Vaccine (5 - Booster for Moderna series) 06/16/2022    Flu Vaccine (1) 08/01/2022    Medicare Yearly Exam  11/23/2022

## 2022-11-28 NOTE — PATIENT INSTRUCTIONS

## 2022-11-29 LAB
ALBUMIN SERPL-MCNC: 4 G/DL (ref 3.5–5)
ALBUMIN/GLOB SERPL: 1.2 {RATIO} (ref 1.1–2.2)
ALP SERPL-CCNC: 74 U/L (ref 45–117)
ALT SERPL-CCNC: 16 U/L (ref 12–78)
ANION GAP SERPL CALC-SCNC: 3 MMOL/L (ref 5–15)
AST SERPL-CCNC: 12 U/L (ref 15–37)
BILIRUB SERPL-MCNC: 0.3 MG/DL (ref 0.2–1)
BUN SERPL-MCNC: 14 MG/DL (ref 6–20)
BUN/CREAT SERPL: 16 (ref 12–20)
CALCIUM SERPL-MCNC: 9.2 MG/DL (ref 8.5–10.1)
CHLORIDE SERPL-SCNC: 110 MMOL/L (ref 97–108)
CHOLEST SERPL-MCNC: 194 MG/DL
CO2 SERPL-SCNC: 31 MMOL/L (ref 21–32)
CREAT SERPL-MCNC: 0.89 MG/DL (ref 0.55–1.02)
ERYTHROCYTE [DISTWIDTH] IN BLOOD BY AUTOMATED COUNT: 12.6 % (ref 11.5–14.5)
GLOBULIN SER CALC-MCNC: 3.4 G/DL (ref 2–4)
GLUCOSE SERPL-MCNC: 83 MG/DL (ref 65–100)
HCT VFR BLD AUTO: 39.3 % (ref 35–47)
HDLC SERPL-MCNC: 69 MG/DL
HDLC SERPL: 2.8 {RATIO} (ref 0–5)
HGB BLD-MCNC: 12.6 G/DL (ref 11.5–16)
LDLC SERPL CALC-MCNC: 97.4 MG/DL (ref 0–100)
MCH RBC QN AUTO: 32.5 PG (ref 26–34)
MCHC RBC AUTO-ENTMCNC: 32.1 G/DL (ref 30–36.5)
MCV RBC AUTO: 101.3 FL (ref 80–99)
NRBC # BLD: 0 K/UL (ref 0–0.01)
NRBC BLD-RTO: 0 PER 100 WBC
PLATELET # BLD AUTO: 192 K/UL (ref 150–400)
PMV BLD AUTO: 10.9 FL (ref 8.9–12.9)
POTASSIUM SERPL-SCNC: 4.5 MMOL/L (ref 3.5–5.1)
PROT SERPL-MCNC: 7.4 G/DL (ref 6.4–8.2)
RBC # BLD AUTO: 3.88 M/UL (ref 3.8–5.2)
SODIUM SERPL-SCNC: 144 MMOL/L (ref 136–145)
TRIGL SERPL-MCNC: 138 MG/DL (ref ?–150)
VLDLC SERPL CALC-MCNC: 27.6 MG/DL
WBC # BLD AUTO: 9.4 K/UL (ref 3.6–11)

## 2022-12-06 ENCOUNTER — TELEPHONE (OUTPATIENT)
Dept: FAMILY MEDICINE CLINIC | Age: 72
End: 2022-12-06

## 2022-12-06 NOTE — TELEPHONE ENCOUNTER
----- Message from Judith Odor sent at 12/6/2022 10:06 AM EST -----  Subject: Results Request    QUESTIONS  Results: recent labs; Ordered by:  Jerica Villavicencio   Date Performed: 2022-11-28  ---------------------------------------------------------------------------  --------------  Teri Dickey INFO    [phone]; OK to leave message on voicemail  ---------------------------------------------------------------------------  --------------

## 2022-12-06 NOTE — TELEPHONE ENCOUNTER
Patient called, notified of lab results. Stated she got a letter, but is having difficulty reading due to vision issues. States she is happy to hear her lipid panel is good. Thanked for information.

## 2022-12-27 ENCOUNTER — TELEPHONE (OUTPATIENT)
Dept: FAMILY MEDICINE CLINIC | Age: 72
End: 2022-12-27

## 2022-12-27 ENCOUNTER — NURSE TRIAGE (OUTPATIENT)
Dept: OTHER | Facility: CLINIC | Age: 72
End: 2022-12-27

## 2022-12-27 RX ORDER — AZITHROMYCIN 250 MG/1
TABLET, FILM COATED ORAL
Qty: 6 TABLET | Refills: 0 | Status: SHIPPED | OUTPATIENT
Start: 2022-12-27 | End: 2023-01-01

## 2022-12-27 NOTE — TELEPHONE ENCOUNTER
Patient called and notified that rx for Zithromax was sent to her pharmacy. Thanked for information. Questioned re: prednisone, stated she experienced insomnia, swelling in legs and \"felt like my heart was racing\".

## 2022-12-27 NOTE — TELEPHONE ENCOUNTER
Patient called, stated she has had  wheezing for x2 weeks. Reports worse wheezing and congestion in the morning. Denies fever. Reports cough is productive. Patient is requesting medication management. Requesting ABT. States she doesn't believe she can take prednisone. Denies any exposure to COVID or flu.

## 2022-12-27 NOTE — TELEPHONE ENCOUNTER
Pt is coughing, wheezing, slipping up phlem. SOB when coughing fit hits. Please advise if Dr can call in an Rx for pt.

## 2022-12-27 NOTE — TELEPHONE ENCOUNTER
[facility]    Received call from Sky Herzog at [facility] with [facility]. Subjective: Caller states \"I am having SOB and wheezing in the morning\"     Current Symptoms: SOB when coughing and wheezing    Onset: 2 week ago; worsening    Pain Severity: denies    Temperature: denies     What has been tried: inhaler    Recommended disposition: Go to Office Now    Care advice provided, patient verbalizes understanding; denies any other questions or concerns; instructed to call back for any new or worsening symptoms. Patient/Caller agrees with recommended disposition; writer provided warm transfer to [facility] for appointment scheduling    Attention Provider: Thank you for allowing me to participate in the care of your patient. The patient was connected to triage in response to information provided to the [facility]. Please do not respond through this encounter as the response is not directed to a shared pool.     Reason for Disposition   MILD difficulty breathing (e.g., minimal/no SOB at rest, SOB with walking, pulse <100) and still present when not coughing    Protocols used: Cough-ADULT-OH

## 2023-01-02 DIAGNOSIS — M79.89 LEG SWELLING: ICD-10-CM

## 2023-01-03 RX ORDER — FUROSEMIDE 20 MG/1
TABLET ORAL
Qty: 60 TABLET | Refills: 0 | Status: SHIPPED | OUTPATIENT
Start: 2023-01-03

## 2023-01-05 ENCOUNTER — NURSE TRIAGE (OUTPATIENT)
Dept: OTHER | Facility: CLINIC | Age: 73
End: 2023-01-05

## 2023-01-05 ENCOUNTER — TELEPHONE (OUTPATIENT)
Dept: FAMILY MEDICINE CLINIC | Age: 73
End: 2023-01-05

## 2023-01-05 ENCOUNTER — VIRTUAL VISIT (OUTPATIENT)
Dept: FAMILY MEDICINE CLINIC | Age: 73
End: 2023-01-05
Payer: MEDICARE

## 2023-01-05 DIAGNOSIS — J30.2 SEASONAL ALLERGIC RHINITIS, UNSPECIFIED TRIGGER: ICD-10-CM

## 2023-01-05 DIAGNOSIS — R05.1 ACUTE COUGH: Primary | ICD-10-CM

## 2023-01-05 PROCEDURE — 99442 PR PHYS/QHP TELEPHONE EVALUATION 11-20 MIN: CPT | Performed by: FAMILY MEDICINE

## 2023-01-05 PROCEDURE — 1101F PT FALLS ASSESS-DOCD LE1/YR: CPT | Performed by: FAMILY MEDICINE

## 2023-01-05 RX ORDER — GUAIFENESIN 100 MG/5ML
200 SOLUTION ORAL
Qty: 180 ML | Refills: 1 | Status: SHIPPED | OUTPATIENT
Start: 2023-01-05

## 2023-01-05 NOTE — TELEPHONE ENCOUNTER
[facility] of patient: [facility]    Received call from rajan at [facility] with [facility] Complaint. Subjective: Caller states \"cough congestion mucus sob with coughing\"     Current Symptoms: coughing sob mik with coughing worse in the morning hx cad no cp a lot of head congestion also drinking and eating well , has been covid vaccinated states is blind and had a virtual visit and was prescribed z pack and states does not feel like is better , would like another virtual OV,     Onset: 1 month ago;     Associated Symptoms: NA    Pain Severity: none    Temperature: none     What has been tried: zpak, zyrtec, muccinex    LMP: NA Pregnant: NA    Recommended disposition: Go to Office Now    Care advice provided, patient verbalizes understanding; denies any other questions or concerns; instructed to call back for any new or worsening symptoms. Patient/Caller agrees with recommended disposition; writer provided warm transfer to [facility] at [facility] for appointment scheduling    Attention Provider: Thank you for allowing me to participate in the care of your patient. The patient was connected to triage in response to information provided to the [facility]. Please do not respond through this encounter as the response is not directed to a shared pool.       Reason for Disposition   MILD difficulty breathing (e.g., minimal/no SOB at rest, SOB with walking, pulse <100) and still present when not coughing    Protocols used: Cough-ADULT-OH

## 2023-01-05 NOTE — TELEPHONE ENCOUNTER
Pt spoke with Nurse and she finished the medication on last Saturday. Pt was issued a ZPAC. It did not seem to help. Going on for a month. Having some SOB, cough and congestion. Please call Pt. She is asking for a VV appointment. Pt is blind. Please call Pt.

## 2023-01-05 NOTE — TELEPHONE ENCOUNTER
Appt Completed.     MD LINA Magallanes & AVELINA REDMAN [facility]  01/05/23

## 2023-03-02 DIAGNOSIS — B96.89 ACUTE BACTERIAL RHINOSINUSITIS: ICD-10-CM

## 2023-03-02 DIAGNOSIS — J01.90 ACUTE BACTERIAL RHINOSINUSITIS: ICD-10-CM

## 2023-03-02 DIAGNOSIS — G89.4 CHRONIC PAIN SYNDROME: Primary | ICD-10-CM

## 2023-03-02 RX ORDER — ALBUTEROL SULFATE 90 UG/1
AEROSOL, METERED RESPIRATORY (INHALATION)
Qty: 8.5 G | Refills: 1 | Status: SHIPPED | OUTPATIENT
Start: 2023-03-02

## 2023-03-03 ENCOUNTER — VIRTUAL VISIT (OUTPATIENT)
Dept: FAMILY MEDICINE CLINIC | Age: 73
End: 2023-03-03

## 2023-03-03 ENCOUNTER — TELEPHONE (OUTPATIENT)
Dept: FAMILY MEDICINE CLINIC | Age: 73
End: 2023-03-03

## 2023-03-03 DIAGNOSIS — R05.8 PRODUCTIVE COUGH: Primary | ICD-10-CM

## 2023-03-03 RX ORDER — DOXYCYCLINE 100 MG/1
100 TABLET ORAL 2 TIMES DAILY
Qty: 10 TABLET | Refills: 0 | Status: SHIPPED | OUTPATIENT
Start: 2023-03-03 | End: 2023-03-08

## 2023-03-03 RX ORDER — DEXTROMETHORPHAN POLISTIREX 30 MG/5ML
60 SUSPENSION ORAL
Qty: 200 ML | Refills: 0 | Status: SHIPPED | OUTPATIENT
Start: 2023-03-03 | End: 2023-03-13

## 2023-03-03 NOTE — PROGRESS NOTES
Sebastian Myers  81 y.o. female  1950  [facility]  [id number]    [phone] (home)      [facility]:    Telephone Encounter  Katharine Benedict MD       Encounter Date: 3/3/2023 at 2:59 PM    Consent:  She and/or the health care decision maker is aware that that she may receive a bill for this telephone service, depending on her insurance coverage, and has provided verbal consent to proceed: Yes    Chief Complaint   Patient presents with    Cough with sputum     History of Present Illness   Chyna Hernández is a 81 y.o. female was evaluated by audio technology from home, through the phone only. I communicated with the patient and/or health care decision maker about sick visit. Symptom of productive cough, yellow phlegm. Cough up about a bottle a day. Worsen 2-3 weeks ago. The cough is on and off during the day, bad at night. When she wakes up at 2-3 am, she can't get sputum up. When she coughs she gets out of breath. + wheezing, rhinorrhea. Negative for fever, sick contact, abdominal pain, diarrhea. She has been using albuterol inhaler on and off when she gets short of breath. Prednisone made her face swell up in the past.     Review of Systems   See HPI    Vitals/Objective:   General: Patient speaking in complete sentences without effort. Normal speech and cooperative. Due to this being a Virtual Check-in/Telephone evaluation, many elements of the physical examination are unable to be assessed. Assessment and Plan:   Time-based coding, delete if not needed: I spent at least 10 minutes with this established patient, and >50% of the time was spent counseling and/or coordinating care regarding productive cough. Total Time: minutes: 5-10 minutes    Sebastian Myers is a 81 y.o. female with mhx of CAD, HTN, GERD was sen via Tele med due to productive cough. Ongoing for 2-3 weeks. Coughing to the point were she is out of breath.  Patient is able to speak in full sentence while on the phone. Treating for possible infection in the lungs, given symptom of yellow productive cough, shortness of breath with coughing. Negative for fever. 1. Productive cough  - dextromethorphan (Delsym) 30 mg/5 mL liquid; Take 10 mL by mouth two (2) times daily as needed for Cough for up to 10 days. Dispense: 200 mL; Refill: 0  - doxycycline (ADOXA) 100 mg tablet; Take 1 Tablet by mouth two (2) times a day for 5 days. Dispense: 10 Tablet; Refill: 0  - Follow up if symptom is not improving.   - Consider CXR if symptom is not improving. We discussed the expected course, resolution and complications of the diagnosis(es) in detail. Medication risks, benefits, costs, interactions, and alternatives were discussed as indicated. I advised her to contact the office if her condition worsens, changes or fails to improve as anticipated. She expressed understanding with the diagnosis(es) and plan. Patient understands that this encounter was a temporary measure, and the importance of further follow up and examination was emphasized. Patient verbalized understanding. I affirm this is a Patient Initiated Episode with an Established Patient who has not had a related appointment within my department in the past 7 days or scheduled within the next 24 hours. Note: not billable if this call serves to triage the patient into an appointment for the relevant concern      Electronically Signed: Neto Cooper MD  Providers location when delivering service: Home      ICD-10-CM ICD-9-CM    1.  Productive cough  R05.8 786.2 dextromethorphan (Delsym) 30 mg/5 mL liquid      doxycycline (ADOXA) 100 mg tablet          Pursuant to the emergency declaration under the [facility] waiver authority and the [employer] Act, this Virtual  Visit was conducted, with patient's consent, to reduce the patient's risk of exposure to COVID-19 and provide continuity of care for an established patient. History   Patients past medical, surgical and family histories were personally reviewed.     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 G[facility]FF[facility]     Past Surgical History:   Procedure Laterality Date    HX ORTHOPAEDIC      CO CORONARY ARTERY BYPASS 5 CORONARY VENOUS GRAFTS  2006    CO UNLISTED PROCEDURE CARDIAC SURGERY       Family History   Problem Relation Age of Onset    No Known Problems Mother     No Known Problems Father      Social History     Socioeconomic History    Marital status:      Spouse name: Not on file    Number of children: Not on file    Years of education: Not on file    Highest education level: Not on file   Occupational History    Not on file   Tobacco Use    Smoking status: Never    Smokeless tobacco: Never   Substance and Sexual Activity    Alcohol use: Yes     Comment: social    Drug use: No    Sexual activity: Yes     Partners: Male     Birth control/protection: None   Other Topics Concern    Not on file   Social History Narrative    Not on file     Social Determinants of Health     Financial Resource Strain: Low Risk     Difficulty of Paying Living Expenses: Not hard at all   Food Insecurity: No Food Insecurity    Worried About Running Out of Food in the Last Year: Never true    Ran Out of Food in the Last Year: Never true   Transportation Needs: Not on file   Physical Activity: Not on file   Stress: Not on file   Social Connections: Not on file   Intimate Partner Violence: Not on file   Housing Stability: Not on file            Current Medications/Allergies   Medications and Allergies reviewed:    Current Outpatient Medications   Medication Sig Dispense Refill    dextromethorphan (Delsym) 30 mg/5 mL liquid Take 10 mL by mouth two (2) times daily as needed for Cough for up to 10 days. 200 mL 0    doxycycline (ADOXA) 100 mg tablet Take 1 Tablet by mouth two (2) times a day for 5 days. 10 Tablet 0    albuterol (PROVENTIL HFA, VENTOLIN HFA, PROAIR HFA) 90 mcg/actuation inhaler INHALE 2 PUFFS BY INHALATION EVERY 4 HOURS AS NEEDED FOR WHEEZING 8.5 g 1    gabapentin (NEURONTIN) 100 mg capsule TAKE ONE CAPSULE BY MOUTH 3 TIMES DAILY AS NEEDED FOR PAIN *MAX DAILY AMOUNT 300 MG* 90 Capsule 1    clopidogreL (PLAVIX) 75 mg tab TAKE ONE TABLET BY MOUTH ONCE A DAY 90 Tablet 1    guaiFENesin (ROBITUSSIN) 100 mg/5 mL liquid Take 10 mL by mouth three (3) times daily as needed for Cough. 180 mL 1    furosemide (LASIX) 20 mg tablet TAKE ONE TABLET BY MOUTH EVERY DAY AS NEEDED FOR SWELLING 60 Tablet 0    simvastatin (ZOCOR) 40 mg tablet TAKE ONE TABLET BY MOUTH NIGHTLY 90 Tablet 1    predniSONE (DELTASONE) 20 mg tablet Take 1 Tablet by mouth daily (with breakfast). 7 Tablet 0    esomeprazole (NEXIUM) 40 mg capsule Take 1 Capsule by mouth in the morning. 90 Capsule 1    fluticasone propionate (FLONASE) 50 mcg/actuation nasal spray USE 2 SPRAYS IN BOTH NOSTRILS DAILY 16 g 2    triamcinolone acetonide (KENALOG) 0.1 % topical cream Apply  to affected area two (2) times a day. use thin layer to face and neck 45 g 0    atenoloL (TENORMIN) 50 mg tablet Take 1 Tablet by mouth daily. 90 Tablet 1    nitroglycerin (NITROSTAT) 0.4 mg SL tablet 1 Tablet by SubLINGual route as needed for Chest Pain. 25 Each 0    aspirin 81 mg tablet Take 81 mg by mouth.          Allergies   Allergen Reactions    Latex Rash    Percocet [Oxycodone-Acetaminophen] Itching

## 2023-03-03 NOTE — TELEPHONE ENCOUNTER
----- Message from Herb Silveira sent at 3/2/2023  2:27 PM EST -----  Subject: Message to Provider    QUESTIONS  Information for Provider? Patient is still wheezing, coughing, congestion. Patient would like a nebulizer sent over to [facility]. Patient has not   been sleeping very well & would like something to help her sleep.   ---------------------------------------------------------------------------  --------------  Coreen GREWAL  [phone]; OK to leave message on voicemail  ---------------------------------------------------------------------------  --------------  SCRIPT ANSWERS  Relationship to Patient?  Self

## 2023-03-08 ENCOUNTER — OFFICE VISIT (OUTPATIENT)
Dept: FAMILY MEDICINE CLINIC | Age: 73
End: 2023-03-08
Payer: MEDICARE

## 2023-03-08 VITALS
TEMPERATURE: 98 F | OXYGEN SATURATION: 98 % | HEIGHT: 61 IN | SYSTOLIC BLOOD PRESSURE: 139 MMHG | BODY MASS INDEX: 31.38 KG/M2 | DIASTOLIC BLOOD PRESSURE: 51 MMHG | HEART RATE: 67 BPM | WEIGHT: 166.2 LBS | RESPIRATION RATE: 16 BRPM

## 2023-03-08 DIAGNOSIS — J40 BRONCHITIS: Primary | ICD-10-CM

## 2023-03-08 PROCEDURE — 1090F PRES/ABSN URINE INCON ASSESS: CPT | Performed by: FAMILY MEDICINE

## 2023-03-08 PROCEDURE — 3075F SYST BP GE 130 - 139MM HG: CPT | Performed by: FAMILY MEDICINE

## 2023-03-08 PROCEDURE — 99213 OFFICE O/P EST LOW 20 MIN: CPT | Performed by: FAMILY MEDICINE

## 2023-03-08 PROCEDURE — G8400 PT W/DXA NO RESULTS DOC: HCPCS | Performed by: FAMILY MEDICINE

## 2023-03-08 PROCEDURE — 3017F COLORECTAL CA SCREEN DOC REV: CPT | Performed by: FAMILY MEDICINE

## 2023-03-08 PROCEDURE — G8536 NO DOC ELDER MAL SCRN: HCPCS | Performed by: FAMILY MEDICINE

## 2023-03-08 PROCEDURE — 3078F DIAST BP <80 MM HG: CPT | Performed by: FAMILY MEDICINE

## 2023-03-08 PROCEDURE — 1101F PT FALLS ASSESS-DOCD LE1/YR: CPT | Performed by: FAMILY MEDICINE

## 2023-03-08 PROCEDURE — G8427 DOCREV CUR MEDS BY ELIG CLIN: HCPCS | Performed by: FAMILY MEDICINE

## 2023-03-08 PROCEDURE — G8417 CALC BMI ABV UP PARAM F/U: HCPCS | Performed by: FAMILY MEDICINE

## 2023-03-08 PROCEDURE — G9717 DOC PT DX DEP/BP F/U NT REQ: HCPCS | Performed by: FAMILY MEDICINE

## 2023-03-08 PROCEDURE — 1123F ACP DISCUSS/DSCN MKR DOCD: CPT | Performed by: FAMILY MEDICINE

## 2023-03-08 RX ORDER — DICLOFENAC SODIUM 10 MG/G
GEL TOPICAL 4 TIMES DAILY
Qty: 100 G | Refills: 0 | Status: SHIPPED | OUTPATIENT
Start: 2023-03-08

## 2023-03-08 RX ORDER — FLUTICASONE PROPIONATE 44 UG/1
2 AEROSOL, METERED RESPIRATORY (INHALATION) 2 TIMES DAILY
Qty: 10.6 G | Refills: 0 | Status: SHIPPED | OUTPATIENT
Start: 2023-03-08

## 2023-03-08 RX ORDER — GABAPENTIN 300 MG/1
300 CAPSULE ORAL 3 TIMES DAILY
Qty: 90 CAPSULE | Refills: 0 | Status: SHIPPED | OUTPATIENT
Start: 2023-03-08

## 2023-03-08 NOTE — PROGRESS NOTES
1. \"Have you been to the ER, urgent care clinic since your last visit? Hospitalized since your last visit? \" No    2. \"Have you seen or consulted any other health care providers outside of the [address] [place] [address] since your last visit? \" No     3. For patients aged 39-70: Has the patient had a colonoscopy / FIT/ Cologuard? No      If the patient is female:    4. For patients aged 41-77: Has the patient had a mammogram within the past 2 years? Yes - no Care Gap present      5. For patients aged 21-65: Has the patient had a pap smear?  NA - based on age or sex    Health Maintenance Due   Topic Date Due    DTaP/Tdap/Td series (1 - Tdap) Never done    Shingles Vaccine (1 of 2) Never done    Bone Densitometry (Dexa) Screening  Never done    Breast Cancer Screen Mammogram  05/20/2017    Colorectal Cancer Screening Combo  12/02/2020    COVID-19 Vaccine (5 - Booster for Moderna series) 06/16/2022    Flu Vaccine (1) 08/01/2022

## 2023-03-08 NOTE — TELEPHONE ENCOUNTER
reviewed and appropriate, Last UDS reviewed. Will increase back to Gabapentin 300 mg.     MD LINA George & AVELINA REDMAN [facility]  03/08/23

## 2023-03-08 NOTE — PROGRESS NOTES
Chief Complaint   Patient presents with    Follow-up     Follow up from being sick. Was given doxycycline. Finished antibiotic. Still has a cough and congestion. Asking for a x-ray of chest.         Urmila Oconnor is a 81 y.o. female who presents for continued cough. She was seen March 3 for a virtual visit. She been having a cough for a couple weeks. She was treated with doxycycline for 5 days. She is feeling better but continues to have some cough and congestion. She has been using albuterol. She tried prednisone pills but could not tolerate them. She has been having some lower rib discomfort; it mainly hurts when she twists. 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- [facility]       Patient Active Problem List   Diagnosis Code    Anxiety F41.9    Hyperlipidemia E78.5    CAD (coronary artery disease) I25.10    Blindness H54.7    HTN (hypertension) I10    Osteopenia M85.80    B12 deficiency E53.8    Insomnia G47.00    Allergic rhinitis J30.9    Gastroesophageal reflux disease without esophagitis K21.9    Moderate episode of recurrent major depressive disorder (HCC) F33.1    Controlled substance agreement signed Z79.899    Chronic pain syndrome G89.4       Meds:   Current Outpatient Medications   Medication Sig Dispense Refill    fluticasone propionate (FLOVENT HFA) 44 mcg/actuation inhaler Take 2 Puffs by inhalation two (2) times a day. 10.6 g 0    diclofenac (VOLTAREN) 1 % gel Apply  to affected area four (4) times daily.  100 g 0    albuterol (PROVENTIL HFA, VENTOLIN HFA, PROAIR HFA) 90 mcg/actuation inhaler INHALE 2 PUFFS BY INHALATION EVERY 4 HOURS AS NEEDED FOR WHEEZING 8.5 g 1    gabapentin (NEURONTIN) 100 mg capsule TAKE ONE CAPSULE BY MOUTH 3 TIMES DAILY AS NEEDED FOR PAIN *MAX DAILY AMOUNT 300 MG* 90 Capsule 1    clopidogreL (PLAVIX) 75 mg tab TAKE ONE TABLET BY MOUTH ONCE A DAY 90 Tablet 1    furosemide (LASIX) 20 mg tablet TAKE ONE TABLET BY MOUTH EVERY DAY AS NEEDED FOR SWELLING 60 Tablet 0    simvastatin (ZOCOR) 40 mg tablet TAKE ONE TABLET BY MOUTH NIGHTLY 90 Tablet 1    esomeprazole (NEXIUM) 40 mg capsule Take 1 Capsule by mouth in the morning. 90 Capsule 1    fluticasone propionate (FLONASE) 50 mcg/actuation nasal spray USE 2 SPRAYS IN BOTH NOSTRILS DAILY 16 g 2    triamcinolone acetonide (KENALOG) 0.1 % topical cream Apply  to affected area two (2) times a day. use thin layer to face and neck 45 g 0    atenoloL (TENORMIN) 50 mg tablet Take 1 Tablet by mouth daily. 90 Tablet 1    nitroglycerin (NITROSTAT) 0.4 mg SL tablet 1 Tablet by SubLINGual route as needed for Chest Pain. 25 Each 0    aspirin 81 mg tablet Take 81 mg by mouth. Allergies: Allergies   Allergen Reactions    Latex Rash    Percocet [Oxycodone-Acetaminophen] Itching       Smoker:  Social History     Tobacco Use   Smoking Status Never   Smokeless Tobacco Never       ETOH:   Social History     Substance and Sexual Activity   Alcohol Use Yes    Comment: social       FH:   Family History   Problem Relation Age of Onset    No Known Problems Mother     No Known Problems Father        ROS:  General/Constitutional:   No fever, fatigue, weight loss     Nose: Nasal congestion and rhinorrea    Cardiac:    No chest pain      Respiratory:  cough   GI:   No nausea/vomiting, diarrhea, abdominal pain, bloody or dark stools       Skin: No rash     Physical Exam:  Visit Vitals  BP (!) 139/51   Pulse 67   Temp 98 °F (36.7 °C) (Oral)   Resp 16   Ht 5' 1\" (1.549 m)   Wt 166 lb 3.2 oz (75.4 kg)   SpO2 98%   BMI 31.40 kg/m²     General: Alert and oriented, in no acute distress. Responds to all questions appropriately. EARS: External normal, canals clear, tympanic membranes normal.         OROPHARYNX: Slight tonsil edema, erythema, no exudate.        LUNGS: Respirations unlabored; clear to auscultation bilaterally, no wheeze, rales or rhonchi. CARDIOVASCULAR: Regular, rate, and rhythm without murmurs, gallops or rubs. XR Results (most recent):  Results from Appointment encounter on 03/08/23    XR CHEST PA LAT    Narrative  Clinical history: Cough  INDICATION:   Cough  COMPARISON: 2022    FINDINGS:  PA and lateral views of the chest are obtained. The cardiopericardial silhouette is within normal limits. There is no pleural  effusion, pneumothorax or focal consolidation present. Impression  No acute intrathoracic disease. Assessment:    ICD-10-CM ICD-9-CM    1. Bronchitis  J40 490 XR CHEST PA LAT      Diagnoses and all orders for this visit:    1. Bronchitis  -     XR CHEST PA LAT; Future  -     fluticasone propionate (FLOVENT HFA) 44 mcg/actuation inhaler; Take 2 Puffs by inhalation two (2) times a day. Other orders  -     diclofenac (VOLTAREN) 1 % gel; Apply  to affected area four (4) times daily. Plan: Trial of inhaled corticosteroid inhaler. RECOMMENDATIONS given include: Symptom care/Env changes/Rest/Push clears constantly. F/U with MD if symptoms worsen or fail to improve and resolve as anticipated. Follow Up:  Get re-examined if not improved in  5-7 days or if symptoms worsen. If you get suddenly worse, go to the nearest hospital Emergency Room    I have discussed the diagnosis with the patient and the intended plan as seen in the above orders. Social history, medical history, and labs were reviewed. The patient has received an after-visit summary and questions were answered concerning future plans. I have discussed medication side effects and warnings with the patient as well. Patient/guardian verbalized understanding and accepts plan & risks. Please note that this dictation was completed with [employer], the [employer] voice recognition software.   Quite often unanticipated grammatical, syntax, homophones, and other interpretive errors are inadvertently transcribed by the computer software. Please disregard these errors. Please excuse any errors that have escaped final proofreading. Thank you.     Brittany Garcia MD

## 2023-03-09 ENCOUNTER — TELEPHONE (OUTPATIENT)
Dept: FAMILY MEDICINE CLINIC | Age: 73
End: 2023-03-09

## 2023-03-09 NOTE — TELEPHONE ENCOUNTER
Patient called and reminded of xray results reviewed with Dr. Joyce Moss yesterday while in office, negative for any significant findings. Verbalizes understanding and thanked for information.

## 2023-05-02 DIAGNOSIS — B96.89 ACUTE BACTERIAL RHINOSINUSITIS: ICD-10-CM

## 2023-05-02 DIAGNOSIS — J01.90 ACUTE BACTERIAL RHINOSINUSITIS: ICD-10-CM

## 2023-05-02 RX ORDER — ALBUTEROL SULFATE 90 UG/1
AEROSOL, METERED RESPIRATORY (INHALATION)
Qty: 8.5 G | Refills: 1 | Status: SHIPPED | OUTPATIENT
Start: 2023-05-02

## 2023-05-15 RX ORDER — TRIAMCINOLONE ACETONIDE 1 MG/G
CREAM TOPICAL
Qty: 45 G | Refills: 1 | Status: SHIPPED | OUTPATIENT
Start: 2023-05-15

## 2023-06-08 ENCOUNTER — OFFICE VISIT (OUTPATIENT)
Facility: CLINIC | Age: 73
End: 2023-06-08
Payer: MEDICARE

## 2023-06-08 VITALS
OXYGEN SATURATION: 96 % | HEART RATE: 70 BPM | RESPIRATION RATE: 20 BRPM | DIASTOLIC BLOOD PRESSURE: 71 MMHG | BODY MASS INDEX: 31.34 KG/M2 | SYSTOLIC BLOOD PRESSURE: 129 MMHG | TEMPERATURE: 97.7 F | WEIGHT: 166 LBS | HEIGHT: 61 IN

## 2023-06-08 DIAGNOSIS — K21.9 GASTROESOPHAGEAL REFLUX DISEASE WITHOUT ESOPHAGITIS: ICD-10-CM

## 2023-06-08 DIAGNOSIS — I10 PRIMARY HYPERTENSION: ICD-10-CM

## 2023-06-08 DIAGNOSIS — J40 BRONCHITIS, NOT SPECIFIED AS ACUTE OR CHRONIC: ICD-10-CM

## 2023-06-08 DIAGNOSIS — I25.10 CORONARY ARTERY DISEASE INVOLVING NATIVE HEART WITHOUT ANGINA PECTORIS, UNSPECIFIED VESSEL OR LESION TYPE: Primary | ICD-10-CM

## 2023-06-08 DIAGNOSIS — E78.2 MIXED HYPERLIPIDEMIA: ICD-10-CM

## 2023-06-08 DIAGNOSIS — E53.8 B12 DEFICIENCY: ICD-10-CM

## 2023-06-08 DIAGNOSIS — F13.20 SEDATIVE, HYPNOTIC OR ANXIOLYTIC DEPENDENCE, UNCOMPLICATED (HCC): ICD-10-CM

## 2023-06-08 DIAGNOSIS — G89.4 CHRONIC PAIN SYNDROME: ICD-10-CM

## 2023-06-08 DIAGNOSIS — F33.1 MODERATE EPISODE OF RECURRENT MAJOR DEPRESSIVE DISORDER (HCC): ICD-10-CM

## 2023-06-08 PROCEDURE — 1123F ACP DISCUSS/DSCN MKR DOCD: CPT | Performed by: FAMILY MEDICINE

## 2023-06-08 PROCEDURE — 3078F DIAST BP <80 MM HG: CPT | Performed by: FAMILY MEDICINE

## 2023-06-08 PROCEDURE — 1090F PRES/ABSN URINE INCON ASSESS: CPT | Performed by: FAMILY MEDICINE

## 2023-06-08 PROCEDURE — 3074F SYST BP LT 130 MM HG: CPT | Performed by: FAMILY MEDICINE

## 2023-06-08 PROCEDURE — G8400 PT W/DXA NO RESULTS DOC: HCPCS | Performed by: FAMILY MEDICINE

## 2023-06-08 PROCEDURE — 3017F COLORECTAL CA SCREEN DOC REV: CPT | Performed by: FAMILY MEDICINE

## 2023-06-08 PROCEDURE — 99214 OFFICE O/P EST MOD 30 MIN: CPT | Performed by: FAMILY MEDICINE

## 2023-06-08 PROCEDURE — G8428 CUR MEDS NOT DOCUMENT: HCPCS | Performed by: FAMILY MEDICINE

## 2023-06-08 PROCEDURE — 1036F TOBACCO NON-USER: CPT | Performed by: FAMILY MEDICINE

## 2023-06-08 PROCEDURE — G8417 CALC BMI ABV UP PARAM F/U: HCPCS | Performed by: FAMILY MEDICINE

## 2023-06-08 RX ORDER — ESOMEPRAZOLE MAGNESIUM 40 MG/1
40 CAPSULE, DELAYED RELEASE ORAL DAILY
Qty: 90 CAPSULE | Refills: 1 | Status: SHIPPED | OUTPATIENT
Start: 2023-06-08

## 2023-06-08 RX ORDER — DOXYCYCLINE 100 MG/1
100 CAPSULE ORAL 2 TIMES DAILY
Qty: 14 CAPSULE | Refills: 0 | Status: SHIPPED | OUTPATIENT
Start: 2023-06-08 | End: 2023-06-15

## 2023-06-08 RX ORDER — SIMVASTATIN 40 MG
40 TABLET ORAL NIGHTLY
Qty: 90 TABLET | Refills: 1 | Status: SHIPPED | OUTPATIENT
Start: 2023-06-08

## 2023-06-08 RX ORDER — FUROSEMIDE 20 MG/1
TABLET ORAL
Qty: 90 TABLET | Refills: 1 | Status: SHIPPED | OUTPATIENT
Start: 2023-06-08

## 2023-06-08 RX ORDER — ATENOLOL 50 MG/1
TABLET ORAL
Qty: 90 TABLET | Refills: 1 | Status: SHIPPED | OUTPATIENT
Start: 2023-06-08

## 2023-06-08 RX ORDER — ATENOLOL 50 MG/1
50 TABLET ORAL DAILY
Qty: 90 TABLET | Refills: 1 | Status: SHIPPED | OUTPATIENT
Start: 2023-06-08 | End: 2023-06-08 | Stop reason: SDUPTHER

## 2023-06-08 RX ORDER — GABAPENTIN 300 MG/1
300 CAPSULE ORAL
Qty: 30 CAPSULE | Refills: 2 | Status: SHIPPED | OUTPATIENT
Start: 2023-06-08 | End: 2023-09-06

## 2023-06-08 RX ORDER — FLUTICASONE PROPIONATE 44 UG/1
2 AEROSOL, METERED RESPIRATORY (INHALATION) 2 TIMES DAILY
Qty: 1 EACH | Refills: 5 | Status: SHIPPED | OUTPATIENT
Start: 2023-06-08

## 2023-06-08 RX ORDER — CLOPIDOGREL BISULFATE 75 MG/1
75 TABLET ORAL DAILY
Qty: 90 TABLET | Refills: 1 | Status: SHIPPED | OUTPATIENT
Start: 2023-06-08

## 2023-06-08 SDOH — ECONOMIC STABILITY: FOOD INSECURITY: WITHIN THE PAST 12 MONTHS, YOU WORRIED THAT YOUR FOOD WOULD RUN OUT BEFORE YOU GOT MONEY TO BUY MORE.: NEVER TRUE

## 2023-06-08 SDOH — ECONOMIC STABILITY: INCOME INSECURITY: HOW HARD IS IT FOR YOU TO PAY FOR THE VERY BASICS LIKE FOOD, HOUSING, MEDICAL CARE, AND HEATING?: NOT HARD AT ALL

## 2023-06-08 SDOH — ECONOMIC STABILITY: HOUSING INSECURITY
IN THE LAST 12 MONTHS, WAS THERE A TIME WHEN YOU DID NOT HAVE A STEADY PLACE TO SLEEP OR SLEPT IN A SHELTER (INCLUDING NOW)?: NO

## 2023-06-08 SDOH — ECONOMIC STABILITY: FOOD INSECURITY: WITHIN THE PAST 12 MONTHS, THE FOOD YOU BOUGHT JUST DIDN'T LAST AND YOU DIDN'T HAVE MONEY TO GET MORE.: NEVER TRUE

## 2023-06-08 ASSESSMENT — ENCOUNTER SYMPTOMS
CHEST TIGHTNESS: 0
ABDOMINAL PAIN: 0
APNEA: 0

## 2023-06-08 ASSESSMENT — PATIENT HEALTH QUESTIONNAIRE - PHQ9
SUM OF ALL RESPONSES TO PHQ9 QUESTIONS 1 & 2: 0
4. FEELING TIRED OR HAVING LITTLE ENERGY: 1
1. LITTLE INTEREST OR PLEASURE IN DOING THINGS: 0
9. THOUGHTS THAT YOU WOULD BE BETTER OFF DEAD, OR OF HURTING YOURSELF: 0
8. MOVING OR SPEAKING SO SLOWLY THAT OTHER PEOPLE COULD HAVE NOTICED. OR THE OPPOSITE, BEING SO FIGETY OR RESTLESS THAT YOU HAVE BEEN MOVING AROUND A LOT MORE THAN USUAL: 0
2. FEELING DOWN, DEPRESSED OR HOPELESS: 0
SUM OF ALL RESPONSES TO PHQ QUESTIONS 1-9: 3
7. TROUBLE CONCENTRATING ON THINGS, SUCH AS READING THE NEWSPAPER OR WATCHING TELEVISION: 0
3. TROUBLE FALLING OR STAYING ASLEEP: 2
5. POOR APPETITE OR OVEREATING: 0
SUM OF ALL RESPONSES TO PHQ QUESTIONS 1-9: 3
6. FEELING BAD ABOUT YOURSELF - OR THAT YOU ARE A FAILURE OR HAVE LET YOURSELF OR YOUR FAMILY DOWN: 0
10. IF YOU CHECKED OFF ANY PROBLEMS, HOW DIFFICULT HAVE THESE PROBLEMS MADE IT FOR YOU TO DO YOUR WORK, TAKE CARE OF THINGS AT HOME, OR GET ALONG WITH OTHER PEOPLE: 0

## 2023-06-08 NOTE — PROGRESS NOTES
1. \"Have you been to the ER, urgent care clinic since your last visit? Hospitalized since your last visit? \" NO    2. \"Have you seen or consulted any other health care providers outside of the [address] [place] [address] since your last visit? \" no    3. For patients aged 39-70: Has the patient had a colonoscopy / FIT/ Cologuard? NO      If the patient is female:    4. For patients aged 41-77: Has the patient had a mammogram within the past 2 years? NO      5. For patients aged 21-65: Has the patient had a pap smear?  N/A    Health Maintenance Due   Topic Date Due    DTaP/Tdap/Td vaccine (1 - Tdap) Never done    Colorectal Cancer Screen  Never done    Breast cancer screen  Never done    DEXA (modify frequency per FRAX score)  Never done    Shingles vaccine (2 of 3) 10/24/2017    COVID-19 Vaccine (5 - Booster for [place] series) 06/16/2022

## 2023-06-09 LAB
ALBUMIN SERPL-MCNC: 4 G/DL (ref 3.5–5)
ALBUMIN/GLOB SERPL: 1.3 (ref 1.1–2.2)
ALP SERPL-CCNC: 70 U/L (ref 45–117)
ALT SERPL-CCNC: 20 U/L (ref 12–78)
ANION GAP SERPL CALC-SCNC: 6 MMOL/L (ref 5–15)
AST SERPL-CCNC: 16 U/L (ref 15–37)
BILIRUB SERPL-MCNC: 0.4 MG/DL (ref 0.2–1)
BUN SERPL-MCNC: 9 MG/DL (ref 6–20)
BUN/CREAT SERPL: 10 (ref 12–20)
CALCIUM SERPL-MCNC: 9.5 MG/DL (ref 8.5–10.1)
CHLORIDE SERPL-SCNC: 107 MMOL/L (ref 97–108)
CHOLEST SERPL-MCNC: 164 MG/DL
CO2 SERPL-SCNC: 30 MMOL/L (ref 21–32)
CREAT SERPL-MCNC: 0.86 MG/DL (ref 0.55–1.02)
ERYTHROCYTE [DISTWIDTH] IN BLOOD BY AUTOMATED COUNT: 12.6 % (ref 11.5–14.5)
GLOBULIN SER CALC-MCNC: 3.2 G/DL (ref 2–4)
GLUCOSE SERPL-MCNC: 107 MG/DL (ref 65–100)
HCT VFR BLD AUTO: 39.7 % (ref 35–47)
HDLC SERPL-MCNC: 60 MG/DL
HDLC SERPL: 2.7 (ref 0–5)
HGB BLD-MCNC: 12.6 G/DL (ref 11.5–16)
LDLC SERPL CALC-MCNC: 83.2 MG/DL (ref 0–100)
MCH RBC QN AUTO: 32.5 PG (ref 26–34)
MCHC RBC AUTO-ENTMCNC: 31.7 G/DL (ref 30–36.5)
MCV RBC AUTO: 102.3 FL (ref 80–99)
NRBC # BLD: 0 K/UL (ref 0–0.01)
NRBC BLD-RTO: 0 PER 100 WBC
PLATELET # BLD AUTO: 194 K/UL (ref 150–400)
PMV BLD AUTO: 10.9 FL (ref 8.9–12.9)
POTASSIUM SERPL-SCNC: 4.7 MMOL/L (ref 3.5–5.1)
PROT SERPL-MCNC: 7.2 G/DL (ref 6.4–8.2)
RBC # BLD AUTO: 3.88 M/UL (ref 3.8–5.2)
SODIUM SERPL-SCNC: 143 MMOL/L (ref 136–145)
TRIGL SERPL-MCNC: 104 MG/DL
VLDLC SERPL CALC-MCNC: 20.8 MG/DL
WBC # BLD AUTO: 6.7 K/UL (ref 3.6–11)

## 2023-07-03 RX ORDER — FLUTICASONE PROPIONATE 50 MCG
SPRAY, SUSPENSION (ML) NASAL
Qty: 16 G | Refills: 2 | Status: SHIPPED | OUTPATIENT
Start: 2023-07-03

## 2023-08-20 NOTE — TELEPHONE ENCOUNTER
[facility] ENCOUNTER        Pt Name: Tomas Max  MRN: [id number]  [facility] 1972  Date of evaluation: 8/20/2023  Provider: ALEXEY Ac CNP  PCP: Arvin [facility]  Note Started: 6:10 PM EDT 8/20/23      ASHWINI. I have evaluated this patient. CHIEF COMPLAINT       Chief Complaint   Patient presents with    Abdominal Pain     Patient presents to ED complaining of LUQ abdominal pain which started this morning. Patient reports hx of diverticulitis and states this feels similar to past exacerbations. Reports some nausea, denies diarrhea, denies genitourinary symptoms. HISTORY OF PRESENT ILLNESS: 1 or more Elements     History From: pt            Chief Complaint:above    Tomas Max is a 52 y.o. female who  has  has a past medical history of Arthritis, Class 3 obesity ([address]), Diverticulosis, Hypertension, Mood disorder ([address]), PTSD (post-traumatic stress disorder), Sleep apnea, and Tobacco use disorder. Presents to ed with left upper quadrant abdominal pain started this morning associate with nausea which she believes may be an early diverticulitis flare. Has had similar flareup few years ago. No active vomiting at this time. Pain is a sharp pain which is not made better by anything. Worse with palpation. Does not radiate. Pt  reports that she has been smoking cigarettes. She has a 20.00 pack-year smoking history. She has been exposed to tobacco smoke. She has never used smokeless tobacco. She reports current alcohol use. She reports that she does not use drugs. Nursing Notes were all reviewed and agreed with or any disagreements were addressed in the HPI. REVIEW OF SYSTEMS :      Review of Systems   Constitutional:  Negative for chills, diaphoresis and fever. HENT:  Negative for congestion, rhinorrhea and sore throat. Eyes:  Negative for pain and visual disturbance.    Respiratory:  Negative for cough and Called patient. She was advised letter was placed in mail today.

## 2023-09-01 DIAGNOSIS — G89.4 CHRONIC PAIN SYNDROME: ICD-10-CM

## 2023-09-01 RX ORDER — GABAPENTIN 300 MG/1
300 CAPSULE ORAL
Qty: 30 CAPSULE | Refills: 0 | Status: SHIPPED | OUTPATIENT
Start: 2023-09-01 | End: 2023-10-13

## 2023-09-12 ENCOUNTER — TELEPHONE (OUTPATIENT)
Facility: CLINIC | Age: 73
End: 2023-09-12

## 2023-09-12 NOTE — TELEPHONE ENCOUNTER
----- Message from Courtney Plunkett sent at 9/12/2023  8:12 AM EDT -----  Subject: Message to Provider    QUESTIONS  Information for Provider? Patient has an appointment scheduled on 9/26/23   at 1000am. She is requesting a flu shot. Please contact.   ---------------------------------------------------------------------------  --------------  Isac SERRATO  [phone]; OK to leave message on voicemail  ---------------------------------------------------------------------------  --------------  SCRIPT ANSWERS  Relationship to Patient?  Self

## 2023-09-26 ENCOUNTER — TELEPHONE (OUTPATIENT)
Facility: CLINIC | Age: 73
End: 2023-09-26

## 2023-09-26 ENCOUNTER — OFFICE VISIT (OUTPATIENT)
Facility: CLINIC | Age: 73
End: 2023-09-26
Payer: MEDICARE

## 2023-09-26 VITALS
OXYGEN SATURATION: 97 % | HEART RATE: 76 BPM | BODY MASS INDEX: 32.44 KG/M2 | RESPIRATION RATE: 18 BRPM | WEIGHT: 171.8 LBS | DIASTOLIC BLOOD PRESSURE: 66 MMHG | HEIGHT: 61 IN | SYSTOLIC BLOOD PRESSURE: 139 MMHG | TEMPERATURE: 98.7 F

## 2023-09-26 DIAGNOSIS — I25.10 CORONARY ARTERY DISEASE INVOLVING NATIVE HEART WITHOUT ANGINA PECTORIS, UNSPECIFIED VESSEL OR LESION TYPE: Primary | ICD-10-CM

## 2023-09-26 DIAGNOSIS — H54.3 BLINDNESS OF BOTH EYES: ICD-10-CM

## 2023-09-26 DIAGNOSIS — I10 PRIMARY HYPERTENSION: ICD-10-CM

## 2023-09-26 DIAGNOSIS — K21.9 GASTROESOPHAGEAL REFLUX DISEASE WITHOUT ESOPHAGITIS: ICD-10-CM

## 2023-09-26 DIAGNOSIS — F13.20 SEDATIVE, HYPNOTIC OR ANXIOLYTIC DEPENDENCE, UNCOMPLICATED (HCC): ICD-10-CM

## 2023-09-26 DIAGNOSIS — E53.8 B12 DEFICIENCY: ICD-10-CM

## 2023-09-26 DIAGNOSIS — E78.2 MIXED HYPERLIPIDEMIA: ICD-10-CM

## 2023-09-26 PROCEDURE — 3017F COLORECTAL CA SCREEN DOC REV: CPT | Performed by: FAMILY MEDICINE

## 2023-09-26 PROCEDURE — 90694 VACC AIIV4 NO PRSRV 0.5ML IM: CPT | Performed by: FAMILY MEDICINE

## 2023-09-26 PROCEDURE — G8417 CALC BMI ABV UP PARAM F/U: HCPCS | Performed by: FAMILY MEDICINE

## 2023-09-26 PROCEDURE — 3075F SYST BP GE 130 - 139MM HG: CPT | Performed by: FAMILY MEDICINE

## 2023-09-26 PROCEDURE — 1036F TOBACCO NON-USER: CPT | Performed by: FAMILY MEDICINE

## 2023-09-26 PROCEDURE — G8427 DOCREV CUR MEDS BY ELIG CLIN: HCPCS | Performed by: FAMILY MEDICINE

## 2023-09-26 PROCEDURE — 1090F PRES/ABSN URINE INCON ASSESS: CPT | Performed by: FAMILY MEDICINE

## 2023-09-26 PROCEDURE — 99214 OFFICE O/P EST MOD 30 MIN: CPT | Performed by: FAMILY MEDICINE

## 2023-09-26 PROCEDURE — 1123F ACP DISCUSS/DSCN MKR DOCD: CPT | Performed by: FAMILY MEDICINE

## 2023-09-26 PROCEDURE — G0008 ADMIN INFLUENZA VIRUS VAC: HCPCS | Performed by: FAMILY MEDICINE

## 2023-09-26 PROCEDURE — 3078F DIAST BP <80 MM HG: CPT | Performed by: FAMILY MEDICINE

## 2023-09-26 PROCEDURE — G8400 PT W/DXA NO RESULTS DOC: HCPCS | Performed by: FAMILY MEDICINE

## 2023-09-26 ASSESSMENT — PATIENT HEALTH QUESTIONNAIRE - PHQ9
3. TROUBLE FALLING OR STAYING ASLEEP: 0
6. FEELING BAD ABOUT YOURSELF - OR THAT YOU ARE A FAILURE OR HAVE LET YOURSELF OR YOUR FAMILY DOWN: 0
4. FEELING TIRED OR HAVING LITTLE ENERGY: 0
2. FEELING DOWN, DEPRESSED OR HOPELESS: 0
SUM OF ALL RESPONSES TO PHQ9 QUESTIONS 1 & 2: 0
9. THOUGHTS THAT YOU WOULD BE BETTER OFF DEAD, OR OF HURTING YOURSELF: 0
SUM OF ALL RESPONSES TO PHQ QUESTIONS 1-9: 0
10. IF YOU CHECKED OFF ANY PROBLEMS, HOW DIFFICULT HAVE THESE PROBLEMS MADE IT FOR YOU TO DO YOUR WORK, TAKE CARE OF THINGS AT HOME, OR GET ALONG WITH OTHER PEOPLE: 0
SUM OF ALL RESPONSES TO PHQ QUESTIONS 1-9: 0
SUM OF ALL RESPONSES TO PHQ QUESTIONS 1-9: 0
8. MOVING OR SPEAKING SO SLOWLY THAT OTHER PEOPLE COULD HAVE NOTICED. OR THE OPPOSITE, BEING SO FIGETY OR RESTLESS THAT YOU HAVE BEEN MOVING AROUND A LOT MORE THAN USUAL: 0
SUM OF ALL RESPONSES TO PHQ QUESTIONS 1-9: 0
7. TROUBLE CONCENTRATING ON THINGS, SUCH AS READING THE NEWSPAPER OR WATCHING TELEVISION: 0
5. POOR APPETITE OR OVEREATING: 0
1. LITTLE INTEREST OR PLEASURE IN DOING THINGS: 0

## 2023-09-26 ASSESSMENT — ENCOUNTER SYMPTOMS: CHEST TIGHTNESS: 0

## 2023-09-26 NOTE — PROGRESS NOTES
1. \"Have you been to the ER, urgent care clinic since your last visit? Hospitalized since your last visit? \" no    2. \"Have you seen or consulted any other health care providers outside of the [address] [place] [address] since your last visit? \" no       Health Maintenance Due   Topic Date Due    DTaP/Tdap/Td vaccine (1 - Tdap) Never done    Colorectal Cancer Screen  Never done    Breast cancer screen  Never done    DEXA (modify frequency per FRAX score)  Never done    Shingles vaccine (2 of 3) 10/24/2017    COVID-19 Vaccine (6 - Moderna series) 11/10/2022    Flu vaccine (1) 08/01/2023

## 2023-09-26 NOTE — PROGRESS NOTES
[facility]    History of Present Illness:   Owen Galarza is a 72 y.o. female with history of CAD, HTN, GERD, Anxiety, HLD, Blindness, insomnia, Depression  CC: Follow up  History provided by patient and Records    HPI:  GERD: Controlled on Nexium     Pain Assessment inventory: Onset of Symptoms: Many years ago  Description: Pain Generally related to body aches and move around. Taking 300 mg nightly at this time, attempted to wean, unable to. Patient is doing well. Frequency: Daily  Pain Scale:(1-10): Mild to Moderate  Hx of similar symptoms: Yes  Radiation: Radiation to the legs  Duration:  continuous    COPD: Well controlled at this time. Coronary Artery Disease Follow up: Today patient reports he is feeling well and overall his symptoms are controlled on his current medications. she  has not had a history of acute myocardial infarction in the past.  Prior management has included:   - Coronary stenting: No  - Coronary bypass: Yes     she has not had CHF      she is  on a statin ( Zocor (simvastatin) ). she is  on antiplatelet therapy. she is not on a beta blocker. she is not on a regular Nitrate therapy. she is  use Nitroglycerin as needed for chest pain. Residual symptoms of CAD include fatigue. Patient denies any current chest pain and exertional chest pressure/discomfort. Risk factors are controlled or at goal.  Primary risk factors include elevated cholesterol, hypertension, and known history of coronary artery disease. Hypertriglyceridemia Follow up:   Cardiovascular risks for her are: existing CAD  hypertension  hyperlipidemia.    Current Medications:  clopidogrel - 75 MG  simvastatin - 40 MG    Compliance: Yes   Myalgias: No   Fatigue: No   Other side effects: No     Wt Readings from Last 3 Encounters:   09/26/23 171 lb 12.8 oz (77.9 kg)   06/08/23 166 lb (75.3 kg)   03/08/23 166 lb 3.2 oz (75.4 kg)       Lab Results   Component Value Date/Time    CHOL 164 69 yo male with PMHx of Rheumatoid Arthritis on prednisone daily, Hep C, HTN, cervical spine/lumbar spine fusion 2015 with Dr. Clay MADDOX, chronic back pain presents with chronic back pain and worsening gait. State that in the morning after first waking up he has been having unsteady gait that typically resolves. Yesterday he states that his instability persisted throughout the day, causing him to veer more to the side. Endorses chronic numbness to his feet, unchanged today. Takes Percocet and Oxycodone for pain, without much relief.  Denies chest pain, visual changes, SOB, LOC/syncope, dizziness, headaches, urinary complaints. In the past, patient reports following with pain management.  ED course: CT head negative for acute pathology. Neuro and Ortho/Spine consulted. Plan for MRI, neuro checks, PT consult in CDU. 67 yo male with PMHx of Rheumatoid Arthritis on prednisone daily, Hep C, HTN, cervical spine/lumbar spine fusion 2015 with Dr. Clay MADDOX, chronic back pain presents with chronic back pain and worsening gait. State that in the morning after first waking up he has been having unsteady gait that typically resolves. Yesterday he states that his instability persisted throughout the day, causing him to veer more to the side. Endorses chronic numbness to his feet, unchanged today. Takes Percocet and Oxycodone for pain, without much relief.  Denies chest pain, visual changes, SOB, LOC/syncope, dizziness, headaches, urinary complaints. In the past, patient reports following with pain management.  ED course: CT head negative for acute pathology. Neuro and Ortho/Spine consulted. Plan for MRI, neuro checks, PT consult in CDU.  In CDU Pt felt improvement of pain after given home dose Oxy. MR L-Spine revealed degenerative changes, surgical changes, L1-L2 mild to moderate thecal sac compression and spinal stenosis. He was seen and evaluated by [facility]ine service who recommended outpt f/up and PT. Pt declined MR brain. He was seen by Neuro who advised pt can have outpt f/up for open MRI instead.  Results and recommendations reviewed with pt. Pt endorsed understanding to f/up with both Neuro and [facility] for continued management. Will provide Podiatry as well per Neuro recs. Pt seen and evaluated by PT who recommended pt have outpt PT with d/c home with a walker to be used PRN. Case management arranged for walker. Discussed plan with Dr. Young, will d/c home

## 2023-09-26 NOTE — TELEPHONE ENCOUNTER
Pt states she use to take potassium for cramps, and has not taken it in a while. Pt says she forgot to tell Dr today at her terrance that she had been having cramps in her feet and toes. Pt would like an Rx for this to be sent to [address] [place] [address].

## 2023-09-27 RX ORDER — POTASSIUM CHLORIDE 750 MG/1
10 TABLET, EXTENDED RELEASE ORAL DAILY
Qty: 90 TABLET | Refills: 1 | Status: SHIPPED | OUTPATIENT
Start: 2023-09-27

## 2023-09-27 NOTE — TELEPHONE ENCOUNTER
Previously on Potassium 10 meq BID. Will restart once daily for now.     MD RACHEL Oseguera & PASTORA BRANTLEY [facility]  09/27/23

## 2023-10-04 RX ORDER — TRIAMCINOLONE ACETONIDE 1 MG/G
CREAM TOPICAL
Qty: 45 G | Refills: 1 | Status: SHIPPED | OUTPATIENT
Start: 2023-10-04

## 2023-10-13 DIAGNOSIS — G89.4 CHRONIC PAIN SYNDROME: ICD-10-CM

## 2023-10-13 RX ORDER — GABAPENTIN 300 MG/1
CAPSULE ORAL
Qty: 30 CAPSULE | Refills: 0 | Status: SHIPPED | OUTPATIENT
Start: 2023-10-13 | End: 2023-11-12

## 2023-10-24 ENCOUNTER — TELEPHONE (OUTPATIENT)
Facility: CLINIC | Age: 73
End: 2023-10-24

## 2023-10-24 NOTE — TELEPHONE ENCOUNTER
----- Message from Shruti Danielle sent at 10/24/2023  8:13 AM EDT -----  Subject: Message to Provider    QUESTIONS  Information for Provider? Pt stated was seen couple weeks ago and stated   has been really sleepy lately and want to see if pcp can call somthing in   for pt . Pt stated has no engery . Pharmacy -[facility]  ---------------------------------------------------------------------------  --------------  Dayana Cooley INFO  [phone]; OK to leave message on voicemail  ---------------------------------------------------------------------------  --------------  SCRIPT ANSWERS  Relationship to Patient?  Self

## 2023-10-24 NOTE — TELEPHONE ENCOUNTER
Call placed to pt and informed that per  she will need an appointment. She stated she will wait unti her next visit.

## 2023-11-08 DIAGNOSIS — G89.4 CHRONIC PAIN SYNDROME: ICD-10-CM

## 2023-11-08 RX ORDER — GABAPENTIN 300 MG/1
CAPSULE ORAL
Qty: 30 CAPSULE | Refills: 0 | Status: SHIPPED | OUTPATIENT
Start: 2023-11-08 | End: 2023-12-08

## 2023-11-13 RX ORDER — FLUTICASONE PROPIONATE 50 MCG
SPRAY, SUSPENSION (ML) NASAL
Qty: 16 G | Refills: 2 | Status: SHIPPED | OUTPATIENT
Start: 2023-11-13

## 2023-12-03 DIAGNOSIS — E78.2 MIXED HYPERLIPIDEMIA: ICD-10-CM

## 2023-12-03 DIAGNOSIS — I10 PRIMARY HYPERTENSION: ICD-10-CM

## 2023-12-03 DIAGNOSIS — I25.10 ATHEROSCLEROTIC HEART DISEASE OF NATIVE CORONARY ARTERY WITHOUT ANGINA PECTORIS: ICD-10-CM

## 2023-12-04 RX ORDER — FUROSEMIDE 20 MG/1
TABLET ORAL
Qty: 90 TABLET | Refills: 1 | Status: SHIPPED | OUTPATIENT
Start: 2023-12-04

## 2023-12-04 RX ORDER — ATENOLOL 50 MG/1
50 TABLET ORAL DAILY
Qty: 90 TABLET | Refills: 1 | Status: SHIPPED | OUTPATIENT
Start: 2023-12-04

## 2023-12-04 RX ORDER — SIMVASTATIN 40 MG
40 TABLET ORAL NIGHTLY
Qty: 90 TABLET | Refills: 1 | Status: SHIPPED | OUTPATIENT
Start: 2023-12-04

## 2023-12-12 DIAGNOSIS — G89.4 CHRONIC PAIN SYNDROME: ICD-10-CM

## 2023-12-12 RX ORDER — GABAPENTIN 300 MG/1
300 CAPSULE ORAL DAILY
Qty: 30 CAPSULE | Refills: 0 | Status: SHIPPED | OUTPATIENT
Start: 2023-12-12 | End: 2024-01-11

## 2024-01-08 ENCOUNTER — OFFICE VISIT (OUTPATIENT)
Facility: CLINIC | Age: 74
End: 2024-01-08
Payer: MEDICARE

## 2024-01-08 VITALS
OXYGEN SATURATION: 98 % | SYSTOLIC BLOOD PRESSURE: 130 MMHG | BODY MASS INDEX: 33.61 KG/M2 | WEIGHT: 178 LBS | HEIGHT: 61 IN | TEMPERATURE: 97 F | HEART RATE: 57 BPM | DIASTOLIC BLOOD PRESSURE: 61 MMHG | RESPIRATION RATE: 20 BRPM

## 2024-01-08 DIAGNOSIS — F33.1 MODERATE EPISODE OF RECURRENT MAJOR DEPRESSIVE DISORDER (HCC): ICD-10-CM

## 2024-01-08 DIAGNOSIS — E78.2 MIXED HYPERLIPIDEMIA: ICD-10-CM

## 2024-01-08 DIAGNOSIS — F13.20 SEDATIVE, HYPNOTIC OR ANXIOLYTIC DEPENDENCE, UNCOMPLICATED (HCC): ICD-10-CM

## 2024-01-08 DIAGNOSIS — I10 PRIMARY HYPERTENSION: Primary | ICD-10-CM

## 2024-01-08 DIAGNOSIS — G89.4 CHRONIC PAIN SYNDROME: ICD-10-CM

## 2024-01-08 DIAGNOSIS — K21.9 GASTROESOPHAGEAL REFLUX DISEASE WITHOUT ESOPHAGITIS: ICD-10-CM

## 2024-01-08 DIAGNOSIS — I25.10 CORONARY ARTERY DISEASE INVOLVING NATIVE HEART WITHOUT ANGINA PECTORIS, UNSPECIFIED VESSEL OR LESION TYPE: ICD-10-CM

## 2024-01-08 DIAGNOSIS — Z00.00 MEDICARE ANNUAL WELLNESS VISIT, SUBSEQUENT: ICD-10-CM

## 2024-01-08 DIAGNOSIS — H54.3 BLINDNESS OF BOTH EYES: ICD-10-CM

## 2024-01-08 PROCEDURE — G8417 CALC BMI ABV UP PARAM F/U: HCPCS | Performed by: FAMILY MEDICINE

## 2024-01-08 PROCEDURE — 3075F SYST BP GE 130 - 139MM HG: CPT | Performed by: FAMILY MEDICINE

## 2024-01-08 PROCEDURE — 3078F DIAST BP <80 MM HG: CPT | Performed by: FAMILY MEDICINE

## 2024-01-08 PROCEDURE — G8400 PT W/DXA NO RESULTS DOC: HCPCS | Performed by: FAMILY MEDICINE

## 2024-01-08 PROCEDURE — 1123F ACP DISCUSS/DSCN MKR DOCD: CPT | Performed by: FAMILY MEDICINE

## 2024-01-08 PROCEDURE — G8484 FLU IMMUNIZE NO ADMIN: HCPCS | Performed by: FAMILY MEDICINE

## 2024-01-08 PROCEDURE — G0439 PPPS, SUBSEQ VISIT: HCPCS | Performed by: FAMILY MEDICINE

## 2024-01-08 PROCEDURE — 1036F TOBACCO NON-USER: CPT | Performed by: FAMILY MEDICINE

## 2024-01-08 PROCEDURE — G8427 DOCREV CUR MEDS BY ELIG CLIN: HCPCS | Performed by: FAMILY MEDICINE

## 2024-01-08 PROCEDURE — 1090F PRES/ABSN URINE INCON ASSESS: CPT | Performed by: FAMILY MEDICINE

## 2024-01-08 PROCEDURE — 3017F COLORECTAL CA SCREEN DOC REV: CPT | Performed by: FAMILY MEDICINE

## 2024-01-08 PROCEDURE — 99214 OFFICE O/P EST MOD 30 MIN: CPT | Performed by: FAMILY MEDICINE

## 2024-01-08 RX ORDER — GABAPENTIN 300 MG/1
300 CAPSULE ORAL DAILY
Qty: 30 CAPSULE | Refills: 2 | Status: SHIPPED | OUTPATIENT
Start: 2024-01-08 | End: 2024-04-07

## 2024-01-08 ASSESSMENT — PATIENT HEALTH QUESTIONNAIRE - PHQ9
2. FEELING DOWN, DEPRESSED OR HOPELESS: 1
4. FEELING TIRED OR HAVING LITTLE ENERGY: 3
10. IF YOU CHECKED OFF ANY PROBLEMS, HOW DIFFICULT HAVE THESE PROBLEMS MADE IT FOR YOU TO DO YOUR WORK, TAKE CARE OF THINGS AT HOME, OR GET ALONG WITH OTHER PEOPLE: 0
SUM OF ALL RESPONSES TO PHQ QUESTIONS 1-9: 4
SUM OF ALL RESPONSES TO PHQ QUESTIONS 1-9: 4
9. THOUGHTS THAT YOU WOULD BE BETTER OFF DEAD, OR OF HURTING YOURSELF: 0
7. TROUBLE CONCENTRATING ON THINGS, SUCH AS READING THE NEWSPAPER OR WATCHING TELEVISION: 0
SUM OF ALL RESPONSES TO PHQ QUESTIONS 1-9: 4
5. POOR APPETITE OR OVEREATING: 0
3. TROUBLE FALLING OR STAYING ASLEEP: 0
8. MOVING OR SPEAKING SO SLOWLY THAT OTHER PEOPLE COULD HAVE NOTICED. OR THE OPPOSITE, BEING SO FIGETY OR RESTLESS THAT YOU HAVE BEEN MOVING AROUND A LOT MORE THAN USUAL: 0
6. FEELING BAD ABOUT YOURSELF - OR THAT YOU ARE A FAILURE OR HAVE LET YOURSELF OR YOUR FAMILY DOWN: 0
SUM OF ALL RESPONSES TO PHQ QUESTIONS 1-9: 4

## 2024-01-08 ASSESSMENT — LIFESTYLE VARIABLES
HOW OFTEN DO YOU HAVE A DRINK CONTAINING ALCOHOL: NEVER
HOW MANY STANDARD DRINKS CONTAINING ALCOHOL DO YOU HAVE ON A TYPICAL DAY: PATIENT DOES NOT DRINK

## 2024-01-08 ASSESSMENT — ENCOUNTER SYMPTOMS
CHEST TIGHTNESS: 0
ABDOMINAL PAIN: 0
APNEA: 0

## 2024-01-08 NOTE — PROGRESS NOTES
[facility]    History of Present Illness:   Kristal Alanis is a 73 y.o. female with history of CAD, HTN, GERD, Anxiety, HLD, Blindness, insomnia, Depression   CC: Follow up  History provided by patient and Records    HPI:  GERD: Controlled on Nexium     Pain Assessment inventory:  Onset of Symptoms: Many years ago  Description: Pain Generally related to body aches and move around.  Taking 300 mg nightly at this time, attempted to wean, unable to.  Patient is doing well.     Frequency: Daily  Pain Scale:(1-10): Mild to Moderate  Hx of similar symptoms: Yes  Radiation: Radiation to the legs  Duration:  continuous    COPD: Well controlled at this time on the Flovent at thsi time, occasional Albuterol use at night in particular.      Coronary Artery Disease Follow up:  Today patient reports he is feeling well and overall his symptoms are controlled on his current medications.  she  has not had a history of acute myocardial infarction in the past.  Prior management has included:   - Coronary stenting: No  - Coronary bypass: Yes     she has not had CHF      she is  on a statin ( Zocor (simvastatin) ).  she is  on antiplatelet therapy.  she is not on a beta blocker.  she is not on a regular Nitrate therapy.  she is  use Nitroglycerin as needed for chest pain.     Residual symptoms of CAD include fatigue.  Patient denies any current chest pain and exertional chest pressure/discomfort.  Risk factors are controlled or at goal.  Primary risk factors include elevated cholesterol, hypertension, and known history of coronary artery disease.     Hypertriglyceridemia Follow up:   Cardiovascular risks for her are: existing CAD  hypertension  hyperlipidemia.   Current Medications:  simvastatin - 40 MG    Compliance: Yes   Myalgias: No   Fatigue: No   Other side effects: No     Wt Readings from Last 3 Encounters:   01/08/24 80.7 kg (178 lb)   09/26/23 77.9 kg (171 lb 12.8 oz)   06/08/23 75.3 kg (166 lb)

## 2024-01-08 NOTE — PATIENT INSTRUCTIONS
instead of frying them.     Eat a variety of fruit and vegetables every day. Dark green, deep orange, red, or yellow fruits and vegetables are especially good for you. Examples include spinach, carrots, peaches, and berries.     Foods high in fiber can reduce your cholesterol and provide important vitamins and minerals. High-fiber foods include whole-grain cereals and breads, oatmeal, beans, brown rice, citrus fruits, and apples.     Eat lean proteins. Heart-healthy proteins include seafood, lean meats and poultry, eggs, beans, peas, nuts, seeds, and soy products.     Limit drinks and foods with added sugar. These include candy, desserts, and soda pop.   Lifestyle changes    If your doctor recommends it, get more exercise. Walking is a good choice. Bit by bit, increase the amount you walk every day. Try for at least 30 minutes on most days of the week. You also may want to swim, bike, or do other activities.     Do not smoke. If you need help quitting, talk to your doctor about stop-smoking programs and medicines. These can increase your chances of quitting for good. Quitting smoking may be the most important step you can take to protect your heart. It is never too late to quit.     Limit alcohol to 2 drinks a day for men and 1 drink a day for women. Too much alcohol can cause health problems.     Manage other health problems such as diabetes, high blood pressure, and high cholesterol. If you think you may have a problem with alcohol or drug use, talk to your doctor.   Medicines    Take your medicines exactly as prescribed. Call your doctor if you think you are having a problem with your medicine.     If your doctor recommends aspirin, take the amount directed each day. Make sure you take aspirin and not another kind of pain reliever, such as acetaminophen (Tylenol).   When should you call for help?   Call 911 if you have symptoms of a heart attack. These may include:    Chest pain or pressure, or a strange feeling

## 2024-01-08 NOTE — PROGRESS NOTES
Medicare Annual Wellness Visit    Kristal Alanis is here for Follow-up Chronic Condition and Medicare AWV    Assessment & Plan   Primary hypertension  Coronary artery disease involving native heart without angina pectoris, unspecified vessel or lesion type  -     Comprehensive Metabolic Panel; Future  -     CBC; Future  Chronic pain syndrome  -     gabapentin (NEURONTIN) 300 MG capsule; Take 1 capsule by mouth daily for 90 days. Appt Required for refills Max Daily Amount: 300 mg, Disp-30 capsule, R-2Normal  -     Compliance Drug Analysis, Urine; Future  Gastroesophageal reflux disease without esophagitis  Sedative, hypnotic or anxiolytic dependence, uncomplicated (HCC)  Moderate episode of recurrent major depressive disorder (HCC)  Blindness of both eyes  Mixed hyperlipidemia  -     Lipid Panel; Future  Medicare annual wellness visit, subsequent    Recommendations for Preventive Services Due: see orders and patient instructions/AVS.  Recommended screening schedule for the next 5-10 years is provided to the patient in written form: see Patient Instructions/AVS.     Return in about 3 months (around 4/8/2024).     Subjective       Patient's complete Health Risk Assessment and screening values have been reviewed and are found in Flowsheets. The following problems were reviewed today and where indicated follow up appointments were made and/or referrals ordered.    Positive Risk Factor Screenings with Interventions:               General HRA Questions:  Select all that apply: (!) New or Increased Pain    Pain Interventions:  Chronic pain      Activity, Diet, and Weight:  On average, how many days per week do you engage in moderate to strenuous exercise (like a brisk walk)?: 0 days  On average, how many minutes do you engage in exercise at this level?: 0 min    Do you eat balanced/healthy meals regularly?: (!) No    Body mass index is 33.63 kg/m². (!) Abnormal      Inactivity Interventions:  Is blind, limits

## 2024-01-09 LAB
ALBUMIN SERPL-MCNC: 3.8 G/DL (ref 3.5–5)
ALBUMIN/GLOB SERPL: 1.1 (ref 1.1–2.2)
ALP SERPL-CCNC: 78 U/L (ref 45–117)
ALT SERPL-CCNC: 18 U/L (ref 12–78)
ANION GAP SERPL CALC-SCNC: 4 MMOL/L (ref 5–15)
AST SERPL-CCNC: 14 U/L (ref 15–37)
BILIRUB SERPL-MCNC: 0.3 MG/DL (ref 0.2–1)
BUN SERPL-MCNC: 16 MG/DL (ref 6–20)
BUN/CREAT SERPL: 17 (ref 12–20)
CALCIUM SERPL-MCNC: 9.1 MG/DL (ref 8.5–10.1)
CHLORIDE SERPL-SCNC: 109 MMOL/L (ref 97–108)
CHOLEST SERPL-MCNC: 184 MG/DL
CO2 SERPL-SCNC: 28 MMOL/L (ref 21–32)
COMMENT:: NORMAL
CREAT SERPL-MCNC: 0.92 MG/DL (ref 0.55–1.02)
ERYTHROCYTE [DISTWIDTH] IN BLOOD BY AUTOMATED COUNT: 12.4 % (ref 11.5–14.5)
GLOBULIN SER CALC-MCNC: 3.4 G/DL (ref 2–4)
GLUCOSE SERPL-MCNC: 104 MG/DL (ref 65–100)
HCT VFR BLD AUTO: 42.9 % (ref 35–47)
HDLC SERPL-MCNC: 60 MG/DL
HDLC SERPL: 3.1 (ref 0–5)
HGB BLD-MCNC: 13.8 G/DL (ref 11.5–16)
LDLC SERPL CALC-MCNC: 97.8 MG/DL (ref 0–100)
MCH RBC QN AUTO: 33.1 PG (ref 26–34)
MCHC RBC AUTO-ENTMCNC: 32.2 G/DL (ref 30–36.5)
MCV RBC AUTO: 102.9 FL (ref 80–99)
NRBC # BLD: 0 K/UL (ref 0–0.01)
NRBC BLD-RTO: 0 PER 100 WBC
PLATELET # BLD AUTO: 193 K/UL (ref 150–400)
PMV BLD AUTO: 11.2 FL (ref 8.9–12.9)
POTASSIUM SERPL-SCNC: 5.1 MMOL/L (ref 3.5–5.1)
PROT SERPL-MCNC: 7.2 G/DL (ref 6.4–8.2)
RBC # BLD AUTO: 4.17 M/UL (ref 3.8–5.2)
SODIUM SERPL-SCNC: 141 MMOL/L (ref 136–145)
SPECIMEN HOLD: NORMAL
TRIGL SERPL-MCNC: 131 MG/DL
VLDLC SERPL CALC-MCNC: 26.2 MG/DL
WBC # BLD AUTO: 7.3 K/UL (ref 3.6–11)

## 2024-01-12 LAB — DRUGS UR: NORMAL

## 2024-01-16 RX ORDER — TRIAMCINOLONE ACETONIDE 1 MG/G
CREAM TOPICAL
Qty: 45 G | Refills: 1 | Status: SHIPPED | OUTPATIENT
Start: 2024-01-16

## 2024-01-27 DIAGNOSIS — K21.9 GASTROESOPHAGEAL REFLUX DISEASE WITHOUT ESOPHAGITIS: ICD-10-CM

## 2024-01-29 RX ORDER — ESOMEPRAZOLE MAGNESIUM 40 MG/1
CAPSULE, DELAYED RELEASE ORAL
Qty: 90 CAPSULE | Refills: 1 | Status: SHIPPED | OUTPATIENT
Start: 2024-01-29

## 2024-01-31 DIAGNOSIS — J40 BRONCHITIS, NOT SPECIFIED AS ACUTE OR CHRONIC: ICD-10-CM

## 2024-01-31 RX ORDER — FLUTICASONE PROPIONATE 44 UG/1
AEROSOL, METERED RESPIRATORY (INHALATION)
Qty: 10.6 G | Refills: 5 | Status: SHIPPED | OUTPATIENT
Start: 2024-01-31

## 2024-02-08 ENCOUNTER — ENROLLMENT (OUTPATIENT)
Dept: PHARMACY | Facility: CLINIC | Age: 74
End: 2024-02-08

## 2024-03-04 ENCOUNTER — TELEPHONE (OUTPATIENT)
Facility: CLINIC | Age: 74
End: 2024-03-04

## 2024-03-04 DIAGNOSIS — J40 BRONCHITIS, NOT SPECIFIED AS ACUTE OR CHRONIC: Primary | ICD-10-CM

## 2024-03-04 NOTE — TELEPHONE ENCOUNTER
PT states that she can not get her Proventil Inhaler. The pharmacy told her that her insurance will not pay for it. Pt needs a refill today. Please advise. Pt would like a call when Rx has been fixed and sent to the Pharmacy.

## 2024-03-05 RX ORDER — ALBUTEROL SULFATE 90 UG/1
2 AEROSOL, METERED RESPIRATORY (INHALATION) 4 TIMES DAILY PRN
Qty: 18 G | Refills: 5 | Status: SHIPPED | OUTPATIENT
Start: 2024-03-05

## 2024-03-07 ENCOUNTER — TELEPHONE (OUTPATIENT)
Facility: CLINIC | Age: 74
End: 2024-03-07

## 2024-03-07 DIAGNOSIS — Z79.899 CONTROLLED SUBSTANCE AGREEMENT SIGNED: Primary | ICD-10-CM

## 2024-03-07 DIAGNOSIS — G89.4 CHRONIC PAIN SYNDROME: ICD-10-CM

## 2024-03-07 NOTE — TELEPHONE ENCOUNTER
Pt is trying to wean herself off of the Gabapentin. Pt is alternating nightly pills one day she takes it, one day she doesn't. Pt states this is messing up her stomach. Pt would like for Dr to help her decrease the dosage. Please send a new Rx to [facility]

## 2024-03-08 RX ORDER — GABAPENTIN 100 MG/1
100 CAPSULE ORAL NIGHTLY
Qty: 30 CAPSULE | Refills: 0 | Status: SHIPPED | OUTPATIENT
Start: 2024-03-08 | End: 2024-04-07

## 2024-03-08 NOTE — TELEPHONE ENCOUNTER
Pt states she gets her Rx delivered to her. She states the pharmacy needs to get this Rx before 2-2:30 pm today or they will not deliver it. Please advise.

## 2024-04-08 DIAGNOSIS — I25.10 CORONARY ARTERY DISEASE INVOLVING NATIVE HEART WITHOUT ANGINA PECTORIS, UNSPECIFIED VESSEL OR LESION TYPE: ICD-10-CM

## 2024-04-08 RX ORDER — CLOPIDOGREL BISULFATE 75 MG/1
75 TABLET ORAL DAILY
Qty: 90 TABLET | Refills: 1 | Status: SHIPPED | OUTPATIENT
Start: 2024-04-08

## 2024-04-09 ENCOUNTER — TELEPHONE (OUTPATIENT)
Facility: CLINIC | Age: 74
End: 2024-04-09

## 2024-04-09 DIAGNOSIS — B96.89 ACUTE BACTERIAL SINUSITIS: Primary | ICD-10-CM

## 2024-04-09 DIAGNOSIS — J01.90 ACUTE BACTERIAL SINUSITIS: Primary | ICD-10-CM

## 2024-04-09 RX ORDER — AMOXICILLIN AND CLAVULANATE POTASSIUM 875; 125 MG/1; MG/1
1 TABLET, FILM COATED ORAL 2 TIMES DAILY
Qty: 14 TABLET | Refills: 0 | Status: SHIPPED | OUTPATIENT
Start: 2024-04-09 | End: 2024-04-16

## 2024-04-09 NOTE — TELEPHONE ENCOUNTER
Patient reports 2 weeks of sinus congestion, pain, and ear pain bialaterally.    Jonn Lopez MD  [facility]  04/09/24

## 2024-04-09 NOTE — TELEPHONE ENCOUNTER
Pt states she is unable to make an appt to be seen. Pt has been dealing with sinus issues that keep getting worse. Pt states her right ear is hurting, and she is coughing up a lot of phlem. Pt would like to know if  can do a VV and fit her in for an appt, or possibly call in something for her ear and congestion. Please advise.

## 2024-04-22 ENCOUNTER — TELEPHONE (OUTPATIENT)
Facility: CLINIC | Age: 74
End: 2024-04-22

## 2024-04-22 DIAGNOSIS — B37.31 YEAST VAGINITIS: Primary | ICD-10-CM

## 2024-04-22 RX ORDER — FLUCONAZOLE 150 MG/1
150 TABLET ORAL
Qty: 2 TABLET | Refills: 0 | Status: SHIPPED | OUTPATIENT
Start: 2024-04-22 | End: 2024-04-28

## 2024-04-22 NOTE — TELEPHONE ENCOUNTER
Pt states she finished taking her antibiotic. Pt states now she has a yeast infection and would like an Rx to treat this sent to [facility]. Please advise.

## 2024-04-25 ENCOUNTER — OFFICE VISIT (OUTPATIENT)
Facility: CLINIC | Age: 74
End: 2024-04-25
Payer: MEDICARE

## 2024-04-25 VITALS
OXYGEN SATURATION: 95 % | WEIGHT: 175 LBS | RESPIRATION RATE: 20 BRPM | TEMPERATURE: 97.7 F | HEIGHT: 61 IN | SYSTOLIC BLOOD PRESSURE: 135 MMHG | DIASTOLIC BLOOD PRESSURE: 71 MMHG | BODY MASS INDEX: 33.04 KG/M2 | HEART RATE: 57 BPM

## 2024-04-25 DIAGNOSIS — F33.1 MODERATE EPISODE OF RECURRENT MAJOR DEPRESSIVE DISORDER (HCC): ICD-10-CM

## 2024-04-25 DIAGNOSIS — G89.4 CHRONIC PAIN SYNDROME: ICD-10-CM

## 2024-04-25 DIAGNOSIS — E53.8 B12 DEFICIENCY: ICD-10-CM

## 2024-04-25 DIAGNOSIS — H54.3 BLINDNESS OF BOTH EYES: ICD-10-CM

## 2024-04-25 DIAGNOSIS — F41.9 ANXIETY: ICD-10-CM

## 2024-04-25 DIAGNOSIS — K21.9 GASTROESOPHAGEAL REFLUX DISEASE WITHOUT ESOPHAGITIS: ICD-10-CM

## 2024-04-25 DIAGNOSIS — I25.10 CORONARY ARTERY DISEASE INVOLVING NATIVE HEART WITHOUT ANGINA PECTORIS, UNSPECIFIED VESSEL OR LESION TYPE: Primary | ICD-10-CM

## 2024-04-25 DIAGNOSIS — E78.2 MIXED HYPERLIPIDEMIA: ICD-10-CM

## 2024-04-25 DIAGNOSIS — I10 PRIMARY HYPERTENSION: ICD-10-CM

## 2024-04-25 PROCEDURE — 1036F TOBACCO NON-USER: CPT | Performed by: FAMILY MEDICINE

## 2024-04-25 PROCEDURE — 3078F DIAST BP <80 MM HG: CPT | Performed by: FAMILY MEDICINE

## 2024-04-25 PROCEDURE — 1123F ACP DISCUSS/DSCN MKR DOCD: CPT | Performed by: FAMILY MEDICINE

## 2024-04-25 PROCEDURE — 3017F COLORECTAL CA SCREEN DOC REV: CPT | Performed by: FAMILY MEDICINE

## 2024-04-25 PROCEDURE — G8417 CALC BMI ABV UP PARAM F/U: HCPCS | Performed by: FAMILY MEDICINE

## 2024-04-25 PROCEDURE — G8400 PT W/DXA NO RESULTS DOC: HCPCS | Performed by: FAMILY MEDICINE

## 2024-04-25 PROCEDURE — 3075F SYST BP GE 130 - 139MM HG: CPT | Performed by: FAMILY MEDICINE

## 2024-04-25 PROCEDURE — 99214 OFFICE O/P EST MOD 30 MIN: CPT | Performed by: FAMILY MEDICINE

## 2024-04-25 PROCEDURE — G8427 DOCREV CUR MEDS BY ELIG CLIN: HCPCS | Performed by: FAMILY MEDICINE

## 2024-04-25 PROCEDURE — 1090F PRES/ABSN URINE INCON ASSESS: CPT | Performed by: FAMILY MEDICINE

## 2024-04-25 ASSESSMENT — ENCOUNTER SYMPTOMS
CHEST TIGHTNESS: 0
ABDOMINAL PAIN: 0

## 2024-04-25 NOTE — PROGRESS NOTES
[facility]    History of Present Illness:   Kristal Alanis is a 74 y.o. female with history of CAD, HTN, GERD, Anxiety, HLD, Blindness, insomnia, Depression   CC: Follow up   History provided by patient and Records    HPI:  Pain Assessment inventory:  Onset of Symptoms: Many years ago  Description: Pain Generally related to body aches and move around.  Patient got off of Gabapentin successfully.  Patient is doing well.  Noting in particular right knee pain is a problem.     Frequency: Daily  Pain Scale:(1-10): Mild to Moderate  Hx of similar symptoms: Yes  Radiation: Radiation to the legs  Duration:  continuous    GERD: Controlled on Nexium     COPD: Well controlled at this time on the Flovent at thsi time, occasional Albuterol use at night in particular.     Environmental Allergies: Noting some recent exacerbation.    Coronary Artery Disease Follow up:  Today patient reports he is feeling well and overall his symptoms are controlled on his current medications.  she  has not had a history of acute myocardial infarction in the past.  Prior management has included:   - Coronary stenting: No  - Coronary bypass: Yes     she has not had CHF      she is  on a statin ( Zocor (simvastatin) ).  she is  on antiplatelet therapy.  she is not on a beta blocker.  she is not on a regular Nitrate therapy.  she is  use Nitroglycerin as needed for chest pain.     Residual symptoms of CAD include fatigue.  Patient denies any current chest pain and exertional chest pressure/discomfort.  Risk factors are controlled or at goal.  Primary risk factors include elevated cholesterol, hypertension, and known history of coronary artery disease.     Hypertriglyceridemia Follow up:   Cardiovascular risks for her are: existing CAD  hypertension  hyperlipidemia.   Current Medications:  simvastatin - 40 MG               Compliance: Yes              Myalgias: No              Fatigue: No              Other side effects:

## 2024-04-26 LAB
ALBUMIN SERPL-MCNC: 3.8 G/DL (ref 3.5–5)
ALBUMIN/GLOB SERPL: 1.1 (ref 1.1–2.2)
ALP SERPL-CCNC: 86 U/L (ref 45–117)
ALT SERPL-CCNC: 18 U/L (ref 12–78)
ANION GAP SERPL CALC-SCNC: 6 MMOL/L (ref 5–15)
AST SERPL-CCNC: 16 U/L (ref 15–37)
BILIRUB SERPL-MCNC: 0.4 MG/DL (ref 0.2–1)
BUN SERPL-MCNC: 14 MG/DL (ref 6–20)
BUN/CREAT SERPL: 15 (ref 12–20)
CALCIUM SERPL-MCNC: 9.8 MG/DL (ref 8.5–10.1)
CHLORIDE SERPL-SCNC: 108 MMOL/L (ref 97–108)
CHOLEST SERPL-MCNC: 172 MG/DL
CO2 SERPL-SCNC: 27 MMOL/L (ref 21–32)
CREAT SERPL-MCNC: 0.91 MG/DL (ref 0.55–1.02)
ERYTHROCYTE [DISTWIDTH] IN BLOOD BY AUTOMATED COUNT: 12.2 % (ref 11.5–14.5)
GLOBULIN SER CALC-MCNC: 3.5 G/DL (ref 2–4)
GLUCOSE SERPL-MCNC: 102 MG/DL (ref 65–100)
HCT VFR BLD AUTO: 40 % (ref 35–47)
HDLC SERPL-MCNC: 55 MG/DL
HDLC SERPL: 3.1 (ref 0–5)
HGB BLD-MCNC: 12.9 G/DL (ref 11.5–16)
LDLC SERPL CALC-MCNC: 89.8 MG/DL (ref 0–100)
MCH RBC QN AUTO: 32.6 PG (ref 26–34)
MCHC RBC AUTO-ENTMCNC: 32.3 G/DL (ref 30–36.5)
MCV RBC AUTO: 101 FL (ref 80–99)
NRBC # BLD: 0 K/UL (ref 0–0.01)
NRBC BLD-RTO: 0 PER 100 WBC
PLATELET # BLD AUTO: 188 K/UL (ref 150–400)
PMV BLD AUTO: 11 FL (ref 8.9–12.9)
POTASSIUM SERPL-SCNC: 4.9 MMOL/L (ref 3.5–5.1)
PROT SERPL-MCNC: 7.3 G/DL (ref 6.4–8.2)
RBC # BLD AUTO: 3.96 M/UL (ref 3.8–5.2)
SODIUM SERPL-SCNC: 141 MMOL/L (ref 136–145)
TRIGL SERPL-MCNC: 136 MG/DL
VIT B12 SERPL-MCNC: 343 PG/ML (ref 193–986)
VLDLC SERPL CALC-MCNC: 27.2 MG/DL
WBC # BLD AUTO: 7.7 K/UL (ref 3.6–11)

## 2024-05-01 ENCOUNTER — TELEPHONE (OUTPATIENT)
Facility: CLINIC | Age: 74
End: 2024-05-01

## 2024-05-01 NOTE — TELEPHONE ENCOUNTER
----- Message from Ilana Shirley sent at 5/1/2024  9:52 AM EDT -----  Subject: Results Request    QUESTIONS  Results: Lab from practice; Ordered by:   Date Performed: 2024-04-25  ---------------------------------------------------------------------------  --------------  CALL BACK INFO    [phone]; Do not leave any message, patient will call back for answer  ---------------------------------------------------------------------------  --------------    Notified patient of lab results via lab order.

## 2024-05-15 ENCOUNTER — TELEPHONE (OUTPATIENT)
Facility: CLINIC | Age: 74
End: 2024-05-15

## 2024-05-15 DIAGNOSIS — B37.31 YEAST VAGINITIS: ICD-10-CM

## 2024-05-15 DIAGNOSIS — J01.90 ACUTE NON-RECURRENT SINUSITIS, UNSPECIFIED LOCATION: Primary | ICD-10-CM

## 2024-05-15 RX ORDER — AMOXICILLIN AND CLAVULANATE POTASSIUM 875; 125 MG/1; MG/1
1 TABLET, FILM COATED ORAL 2 TIMES DAILY
Qty: 14 TABLET | Refills: 0 | Status: SHIPPED | OUTPATIENT
Start: 2024-05-15 | End: 2024-05-22

## 2024-05-15 RX ORDER — FLUCONAZOLE 150 MG/1
TABLET ORAL
Qty: 2 TABLET | Refills: 0 | Status: SHIPPED | OUTPATIENT
Start: 2024-05-15

## 2024-05-15 NOTE — TELEPHONE ENCOUNTER
Will treat with abx.  If not effective though will need to come in for evaluation.  Jonn Lopez MD  [facility]  05/15/24

## 2024-05-15 NOTE — TELEPHONE ENCOUNTER
Pt states at her last appt with her PCP she told him she was having issues with her sinuses. Pt states it has gotten worse and now she has a cough and is bringing up a lot of mucus. Pt would like for the Dr to send in an antibiotic since pt thinks she has a chest infection. Pt states she can not make an appt due to transportation issues. Nyla advise.

## 2024-05-19 DIAGNOSIS — E78.2 MIXED HYPERLIPIDEMIA: ICD-10-CM

## 2024-05-19 DIAGNOSIS — I25.10 ATHEROSCLEROTIC HEART DISEASE OF NATIVE CORONARY ARTERY WITHOUT ANGINA PECTORIS: ICD-10-CM

## 2024-05-20 RX ORDER — ATENOLOL 50 MG/1
50 TABLET ORAL DAILY
Qty: 90 TABLET | Refills: 1 | Status: SHIPPED | OUTPATIENT
Start: 2024-05-20

## 2024-05-20 RX ORDER — SIMVASTATIN 40 MG
40 TABLET ORAL NIGHTLY
Qty: 90 TABLET | Refills: 1 | Status: SHIPPED | OUTPATIENT
Start: 2024-05-20

## 2024-06-17 DIAGNOSIS — I10 PRIMARY HYPERTENSION: ICD-10-CM

## 2024-06-17 RX ORDER — FUROSEMIDE 20 MG/1
TABLET ORAL
Qty: 90 TABLET | Refills: 1 | Status: SHIPPED | OUTPATIENT
Start: 2024-06-17

## 2024-06-20 DIAGNOSIS — J45.901 ASTHMA WITH ACUTE EXACERBATION, UNSPECIFIED ASTHMA SEVERITY, UNSPECIFIED WHETHER PERSISTENT: Primary | ICD-10-CM

## 2024-06-20 RX ORDER — BECLOMETHASONE DIPROPIONATE HFA 80 UG/1
1 AEROSOL, METERED RESPIRATORY (INHALATION) 2 TIMES DAILY
COMMUNITY
Start: 2024-05-25 | End: 2024-06-20 | Stop reason: SDUPTHER

## 2024-06-20 RX ORDER — BECLOMETHASONE DIPROPIONATE HFA 80 UG/1
1 AEROSOL, METERED RESPIRATORY (INHALATION) 2 TIMES DAILY
Qty: 1 EACH | Refills: 1 | Status: SHIPPED | OUTPATIENT
Start: 2024-06-20

## 2024-06-20 NOTE — TELEPHONE ENCOUNTER
Pt was seen at [facility] on 5-25 for chronic bronchitis & asthma. Pt was given an inhaler, but no refills. Pt does not know the name of the inhaler. Pt says she needs a refill. Please advise.

## 2024-07-27 DIAGNOSIS — K21.9 GASTROESOPHAGEAL REFLUX DISEASE WITHOUT ESOPHAGITIS: ICD-10-CM

## 2024-07-29 RX ORDER — ESOMEPRAZOLE MAGNESIUM 40 MG/1
CAPSULE, DELAYED RELEASE ORAL
Qty: 90 CAPSULE | Refills: 1 | Status: SHIPPED | OUTPATIENT
Start: 2024-07-29

## 2024-08-27 DIAGNOSIS — J45.901 ASTHMA WITH ACUTE EXACERBATION, UNSPECIFIED ASTHMA SEVERITY, UNSPECIFIED WHETHER PERSISTENT: ICD-10-CM

## 2024-08-27 RX ORDER — BECLOMETHASONE DIPROPIONATE HFA 80 UG/1
AEROSOL, METERED RESPIRATORY (INHALATION)
Qty: 10.6 G | Refills: 1 | Status: SHIPPED | OUTPATIENT
Start: 2024-08-27

## 2024-09-24 ENCOUNTER — COMMUNITY OUTREACH (OUTPATIENT)
Facility: CLINIC | Age: 74
End: 2024-09-24

## 2024-10-04 DIAGNOSIS — I25.10 CORONARY ARTERY DISEASE INVOLVING NATIVE HEART WITHOUT ANGINA PECTORIS, UNSPECIFIED VESSEL OR LESION TYPE: ICD-10-CM

## 2024-10-07 RX ORDER — CLOPIDOGREL BISULFATE 75 MG/1
75 TABLET ORAL DAILY
Qty: 90 TABLET | Refills: 1 | Status: SHIPPED | OUTPATIENT
Start: 2024-10-07

## 2024-10-14 ENCOUNTER — OFFICE VISIT (OUTPATIENT)
Facility: CLINIC | Age: 74
End: 2024-10-14
Payer: MEDICARE

## 2024-10-14 VITALS
DIASTOLIC BLOOD PRESSURE: 70 MMHG | SYSTOLIC BLOOD PRESSURE: 158 MMHG | OXYGEN SATURATION: 97 % | HEIGHT: 61 IN | TEMPERATURE: 97.2 F | WEIGHT: 178.6 LBS | HEART RATE: 62 BPM | BODY MASS INDEX: 33.72 KG/M2 | RESPIRATION RATE: 18 BRPM

## 2024-10-14 DIAGNOSIS — F33.1 MODERATE EPISODE OF RECURRENT MAJOR DEPRESSIVE DISORDER (HCC): ICD-10-CM

## 2024-10-14 DIAGNOSIS — F13.20 SEDATIVE, HYPNOTIC OR ANXIOLYTIC DEPENDENCE, UNCOMPLICATED (HCC): ICD-10-CM

## 2024-10-14 DIAGNOSIS — G89.4 CHRONIC PAIN SYNDROME: ICD-10-CM

## 2024-10-14 DIAGNOSIS — J30.1 NON-SEASONAL ALLERGIC RHINITIS DUE TO POLLEN: ICD-10-CM

## 2024-10-14 DIAGNOSIS — H54.3 BLINDNESS OF BOTH EYES: ICD-10-CM

## 2024-10-14 DIAGNOSIS — I25.10 CORONARY ARTERY DISEASE INVOLVING NATIVE HEART WITHOUT ANGINA PECTORIS, UNSPECIFIED VESSEL OR LESION TYPE: Primary | ICD-10-CM

## 2024-10-14 DIAGNOSIS — F41.9 ANXIETY: ICD-10-CM

## 2024-10-14 DIAGNOSIS — Z23 IMMUNIZATION DUE: ICD-10-CM

## 2024-10-14 DIAGNOSIS — K21.9 GASTROESOPHAGEAL REFLUX DISEASE WITHOUT ESOPHAGITIS: ICD-10-CM

## 2024-10-14 DIAGNOSIS — L29.9 ITCHING: ICD-10-CM

## 2024-10-14 PROCEDURE — G8400 PT W/DXA NO RESULTS DOC: HCPCS | Performed by: FAMILY MEDICINE

## 2024-10-14 PROCEDURE — 1090F PRES/ABSN URINE INCON ASSESS: CPT | Performed by: FAMILY MEDICINE

## 2024-10-14 PROCEDURE — G0008 ADMIN INFLUENZA VIRUS VAC: HCPCS | Performed by: FAMILY MEDICINE

## 2024-10-14 PROCEDURE — 3078F DIAST BP <80 MM HG: CPT | Performed by: FAMILY MEDICINE

## 2024-10-14 PROCEDURE — 1123F ACP DISCUSS/DSCN MKR DOCD: CPT | Performed by: FAMILY MEDICINE

## 2024-10-14 PROCEDURE — G8427 DOCREV CUR MEDS BY ELIG CLIN: HCPCS | Performed by: FAMILY MEDICINE

## 2024-10-14 PROCEDURE — 3077F SYST BP >= 140 MM HG: CPT | Performed by: FAMILY MEDICINE

## 2024-10-14 PROCEDURE — G8482 FLU IMMUNIZE ORDER/ADMIN: HCPCS | Performed by: FAMILY MEDICINE

## 2024-10-14 PROCEDURE — 1036F TOBACCO NON-USER: CPT | Performed by: FAMILY MEDICINE

## 2024-10-14 PROCEDURE — 90653 IIV ADJUVANT VACCINE IM: CPT | Performed by: FAMILY MEDICINE

## 2024-10-14 PROCEDURE — 99214 OFFICE O/P EST MOD 30 MIN: CPT | Performed by: FAMILY MEDICINE

## 2024-10-14 PROCEDURE — G8417 CALC BMI ABV UP PARAM F/U: HCPCS | Performed by: FAMILY MEDICINE

## 2024-10-14 PROCEDURE — 3017F COLORECTAL CA SCREEN DOC REV: CPT | Performed by: FAMILY MEDICINE

## 2024-10-14 RX ORDER — CETIRIZINE HYDROCHLORIDE 5 MG/1
5 TABLET ORAL DAILY
COMMUNITY

## 2024-10-14 RX ORDER — ASPIRIN 81 MG/1
81 TABLET, CHEWABLE ORAL DAILY
COMMUNITY

## 2024-10-14 RX ORDER — HYDROXYZINE HYDROCHLORIDE 25 MG/1
25 TABLET, FILM COATED ORAL EVERY 8 HOURS PRN
Qty: 60 TABLET | Refills: 1 | Status: SHIPPED | OUTPATIENT
Start: 2024-10-14

## 2024-10-14 SDOH — ECONOMIC STABILITY: FOOD INSECURITY: WITHIN THE PAST 12 MONTHS, YOU WORRIED THAT YOUR FOOD WOULD RUN OUT BEFORE YOU GOT MONEY TO BUY MORE.: NEVER TRUE

## 2024-10-14 SDOH — ECONOMIC STABILITY: FOOD INSECURITY: WITHIN THE PAST 12 MONTHS, THE FOOD YOU BOUGHT JUST DIDN'T LAST AND YOU DIDN'T HAVE MONEY TO GET MORE.: NEVER TRUE

## 2024-10-14 SDOH — ECONOMIC STABILITY: INCOME INSECURITY: HOW HARD IS IT FOR YOU TO PAY FOR THE VERY BASICS LIKE FOOD, HOUSING, MEDICAL CARE, AND HEATING?: NOT HARD AT ALL

## 2024-10-14 ASSESSMENT — ENCOUNTER SYMPTOMS
CHEST TIGHTNESS: 0
ABDOMINAL PAIN: 0
APNEA: 0
ABDOMINAL DISTENTION: 0
ROS SKIN COMMENTS: ITCHING

## 2024-10-14 NOTE — PROGRESS NOTES
fluticasone (FLOVENT HFA) 44 MCG/ACT inhaler INHALE TWO PUFFS INTO THE LUNGS TWICE DAILY 10.6 g 5    triamcinolone (KENALOG) 0.1 % cream APPLY THIN LAYER TO AFFECTED AREA OF FACE AND NECK TWICE DAILY 45 g 1    fluticasone (FLONASE) 50 MCG/ACT nasal spray USE 2 SPRAYS IN BOTH NOSTRILS DAILY 16 g 2    potassium chloride (KLOR-CON M) 10 MEQ extended release tablet Take 1 tablet by mouth daily 90 tablet 1    diclofenac sodium (VOLTAREN) 1 % GEL Apply 1 g topically 4 times daily 350 g 1    albuterol sulfate HFA (PROVENTIL;VENTOLIN;PROAIR) 108 (90 Base) MCG/ACT inhaler INHALE 2 PUFFS BY INHALATION EVERY 4 HOURS AS NEEDED FOR WHEEZING      nitroGLYCERIN (NITROSTAT) 0.4 MG SL tablet Place 1 tablet under the tongue as needed       No current facility-administered medications on file prior to visit.       Patient Active Problem List   Diagnosis    Blindness    Allergic rhinitis    B12 deficiency    Hyperlipidemia    Osteopenia    Moderate episode of recurrent major depressive disorder (HCC)    CAD (coronary artery disease)    Anxiety    HTN (hypertension)    Insomnia    Gastroesophageal reflux disease without esophagitis    Controlled substance agreement signed    Chronic pain syndrome    Sedative, hypnotic or anxiolytic dependence, uncomplicated (HCC)       Social History     Socioeconomic History    Marital status:      Spouse name: None    Number of children: None    Years of education: None    Highest education level: None   Tobacco Use    Smoking status: Never    Smokeless tobacco: Never   Vaping Use    Vaping status: Never Used   Substance and Sexual Activity    Alcohol use: Yes    Drug use: No    Sexual activity: Defer     Birth control/protection: None     Social Determinants of Health     Financial Resource Strain: Low Risk  (10/14/2024)    Overall Financial Resource Strain (CARDIA)     Difficulty of Paying Living Expenses: Not hard at all   Food Insecurity: No Food Insecurity (10/14/2024)    Hunger Vital

## 2024-11-01 ENCOUNTER — TELEMEDICINE (OUTPATIENT)
Facility: CLINIC | Age: 74
End: 2024-11-01

## 2024-11-01 DIAGNOSIS — J30.1 SEASONAL ALLERGIC RHINITIS DUE TO POLLEN: ICD-10-CM

## 2024-11-01 DIAGNOSIS — I25.10 CORONARY ARTERY DISEASE INVOLVING NATIVE HEART WITHOUT ANGINA PECTORIS, UNSPECIFIED VESSEL OR LESION TYPE: ICD-10-CM

## 2024-11-01 DIAGNOSIS — E53.8 B12 DEFICIENCY: ICD-10-CM

## 2024-11-01 DIAGNOSIS — J01.90 ACUTE BACTERIAL SINUSITIS: Primary | ICD-10-CM

## 2024-11-01 DIAGNOSIS — B96.89 ACUTE BACTERIAL SINUSITIS: Primary | ICD-10-CM

## 2024-11-01 RX ORDER — PREDNISONE 10 MG/1
10 TABLET ORAL DAILY
Qty: 5 TABLET | Refills: 0 | Status: SHIPPED | OUTPATIENT
Start: 2024-11-01 | End: 2024-11-06

## 2024-11-01 RX ORDER — UREA 10 %
500 LOTION (ML) TOPICAL DAILY
Qty: 30 TABLET | Refills: 3 | Status: SHIPPED | OUTPATIENT
Start: 2024-11-01 | End: 2025-11-01

## 2024-11-01 RX ORDER — DOXYCYCLINE 100 MG/1
100 CAPSULE ORAL 2 TIMES DAILY
Qty: 14 CAPSULE | Refills: 0 | Status: SHIPPED | OUTPATIENT
Start: 2024-11-01 | End: 2024-11-08

## 2024-11-01 ASSESSMENT — ENCOUNTER SYMPTOMS
APNEA: 0
CHEST TIGHTNESS: 0

## 2024-11-01 NOTE — PROGRESS NOTES
\"Have you been to the ER, urgent care clinic since your last visit?  Hospitalized since your last visit?\"    NO    “Have you seen or consulted any other health care providers outside of [facility] since your last visit?”    NO    Have you had a mammogram?”   NO    No breast cancer screening on file         “Have you had a colorectal cancer screening such as a colonoscopy/FIT/Cologuard?    NO    No colonoscopy on file  No cologuard on file  No FIT/FOBT on file   No flexible sigmoidoscopy on file         Click Here for Release of Records Request

## 2024-11-01 NOTE — PROGRESS NOTES
Kristal Alanis is a 74 y.o. female evaluated via Telephone on 24.  Patient Identity confirmed by .    Kristal Alanis was evaluated through a synchronous (real-time) audio encounter. Patient identification was verified at the start of the visit. She (or guardian if applicable) is aware that this is a billable service, which includes applicable co-pays. This visit was conducted with the patient's (and/or legal guardian's) verbal consent. She has not had a related appointment within my department in the past 7 days or scheduled within the next 24 hours.   The patient was located at Home: [address] [address].  The provider was located at Facility (Appt Dept): [address] [place] [address].  Confirm you are appropriately licensed, registered, or certified to deliver care in the state where the patient is located as indicated above. If you are not or unsure, please re-schedule the visit: Yes, I confirm.     Note: not billable if this call serves to triage the patient into an appointment for the relevant concern    Kristal Alanis is a 74 y.o. female evaluated via telephone on 2024 for Cough, Congestion, Ear Pain, and Wheezing    Physician Location: Office  Patient Location: Home  Nurse Assisting with Encounter: JUAN Angelo    Chief Complaint   Patient presents with    Cough    Congestion    Ear Pain    Wheezing      Information gathered from patient and/or health care decision maker.    HPI:   Sinusitis: Patient presents with sinusitis symptoms over the last 7 days.  Patient reports symptoms including nasal congestion, cough, fevers, facial pain, Phlegm Production and denies sore throats.  Patient describes pain of the left sinus(es).  Patient does report Viral URI prior to developing these symptoms.  Previous OTC treatments include Flonase, inhalers, zyrtec which have been minimally effective in treating symptoms.  she denies recent sick contacts.  she does

## 2024-11-25 RX ORDER — FLUTICASONE PROPIONATE 50 MCG
SPRAY, SUSPENSION (ML) NASAL
Qty: 16 G | Refills: 2 | Status: SHIPPED | OUTPATIENT
Start: 2024-11-25

## 2024-11-28 DIAGNOSIS — E78.2 MIXED HYPERLIPIDEMIA: ICD-10-CM

## 2024-11-28 DIAGNOSIS — I25.10 ATHEROSCLEROTIC HEART DISEASE OF NATIVE CORONARY ARTERY WITHOUT ANGINA PECTORIS: ICD-10-CM

## 2024-11-29 RX ORDER — ATENOLOL 50 MG/1
50 TABLET ORAL DAILY
Qty: 90 TABLET | Refills: 1 | Status: SHIPPED | OUTPATIENT
Start: 2024-11-29

## 2024-11-29 RX ORDER — SIMVASTATIN 40 MG
40 TABLET ORAL NIGHTLY
Qty: 90 TABLET | Refills: 1 | Status: SHIPPED | OUTPATIENT
Start: 2024-11-29

## 2025-01-02 DIAGNOSIS — I10 PRIMARY HYPERTENSION: ICD-10-CM

## 2025-01-02 RX ORDER — FUROSEMIDE 20 MG/1
TABLET ORAL
Qty: 90 TABLET | Refills: 1 | Status: SHIPPED | OUTPATIENT
Start: 2025-01-02

## 2025-01-03 ENCOUNTER — TELEPHONE (OUTPATIENT)
Facility: CLINIC | Age: 75
End: 2025-01-03

## 2025-01-03 DIAGNOSIS — B96.89 ACUTE BACTERIAL SINUSITIS: Primary | ICD-10-CM

## 2025-01-03 DIAGNOSIS — J01.90 ACUTE BACTERIAL SINUSITIS: Primary | ICD-10-CM

## 2025-01-03 RX ORDER — GUAIFENESIN 600 MG/1
600 TABLET, EXTENDED RELEASE ORAL 2 TIMES DAILY
Qty: 30 TABLET | Refills: 0 | Status: SHIPPED | OUTPATIENT
Start: 2025-01-03 | End: 2025-01-18

## 2025-01-03 NOTE — TELEPHONE ENCOUNTER
Call made to patient.  No answer. Left message to call back.    Per Dr. Blunt:Hey, can you find out how long she has been having symptoms?  Thanks, Rneé

## 2025-01-03 NOTE — TELEPHONE ENCOUNTER
Pt states she has yellow mucus coming out her nasal passage, eye and ears hurt, and have a low grade fever. Pt is asking if pcp can prescribe something for her. Please send to [facility]. Pt also asked if it could be before 2pm so she can get it delivered. Please advise.

## 2025-01-03 NOTE — TELEPHONE ENCOUNTER
Medication reordered.    Jonn Lopez MD  [facility]  01/03/25

## 2025-01-06 ENCOUNTER — TELEPHONE (OUTPATIENT)
Facility: CLINIC | Age: 75
End: 2025-01-06

## 2025-01-06 RX ORDER — FLUCONAZOLE 150 MG/1
150 TABLET ORAL
Qty: 2 TABLET | Refills: 0 | Status: SHIPPED | OUTPATIENT
Start: 2025-01-06 | End: 2025-01-12

## 2025-01-06 NOTE — TELEPHONE ENCOUNTER
Pt states she now has a yeast infection from taking the Augmentin. Pt would like for the pcp to send over and Rx for the yeast infection to [facility]. Please advise.

## 2025-01-24 DIAGNOSIS — K21.9 GASTROESOPHAGEAL REFLUX DISEASE WITHOUT ESOPHAGITIS: ICD-10-CM

## 2025-01-24 RX ORDER — ESOMEPRAZOLE MAGNESIUM 40 MG/1
CAPSULE, DELAYED RELEASE ORAL
Qty: 90 CAPSULE | Refills: 1 | Status: SHIPPED | OUTPATIENT
Start: 2025-01-24

## 2025-02-24 ENCOUNTER — TELEPHONE (OUTPATIENT)
Facility: CLINIC | Age: 75
End: 2025-02-24

## 2025-02-24 DIAGNOSIS — J01.90 ACUTE BACTERIAL SINUSITIS: Primary | ICD-10-CM

## 2025-02-24 DIAGNOSIS — B96.89 ACUTE BACTERIAL SINUSITIS: Primary | ICD-10-CM

## 2025-02-24 NOTE — TELEPHONE ENCOUNTER
Pt states for the last two weeks she believes she has a sinus infection. States she has a low grade fever, light headed, slight cough, ears popping, and when she blow her nose seems to be a little blood. Pt would like to know what pcp recommend she do. If pcp prescribe meds please send to [facility]. Please advise.

## 2025-02-25 RX ORDER — FLUCONAZOLE 150 MG/1
150 TABLET ORAL
Qty: 2 TABLET | Refills: 0 | Status: SHIPPED | OUTPATIENT
Start: 2025-02-25 | End: 2025-03-03

## 2025-04-04 DIAGNOSIS — I25.10 CORONARY ARTERY DISEASE INVOLVING NATIVE HEART WITHOUT ANGINA PECTORIS, UNSPECIFIED VESSEL OR LESION TYPE: ICD-10-CM

## 2025-04-04 RX ORDER — CLOPIDOGREL BISULFATE 75 MG/1
75 TABLET ORAL DAILY
Qty: 90 TABLET | Refills: 1 | Status: SHIPPED | OUTPATIENT
Start: 2025-04-04

## 2025-04-12 NOTE — PROGRESS NOTES
[facility]    History of Present Illness:   Mike Nguyen is a 81 y.o. female with history of CAD, HTN, GERD, Anxiety, HLD, Blindness, insomnia, Depression  CC: Follow up, Rash  History provided by patient and Records    HPI:  Chronic Pain: Patient is taking Gabapentin at this time for pain, overall working well. Noting gets occasional electric shocks in the legs that improve with movement. Needs refil of Gabapentin    Environmental Allergies: Patient noting some increased symptoms recently, some sinus drainage. Coronary Artery Disease Follow up: Today patient reports he is feeling well and overall his symptoms are controlled on his current medications. she  has not had a history of acute myocardial infarction in the past.  Prior management has included:   - Coronary stenting: NO  - Coronary bypass: YES     she has not had CHF            Key CAD CHF Meds                 clopidogreL (PLAVIX) 75 mg tab (Taking) TAKE ONE TABLET BY MOUTH ONCE A DAY     furosemide (LASIX) 20 mg tablet (Taking) Take 1 tablet daily as needed for swelling     atenoloL (TENORMIN) 50 mg tablet (Taking) Take 1 Tablet by mouth daily.     simvastatin (ZOCOR) 40 mg tablet (Taking) Take 1 Tablet by mouth nightly.     nitroglycerin (NITROSTAT) 0.4 mg SL tablet (Taking) 1 Tablet by SubLINGual route as needed for Chest Pain.     aspirin 81 mg tablet (Taking) Take 81 mg by mouth.            she is on a statin ( Zocor (simvastatin) ). she is on antiplatelet therapy. she is on a beta blocker. she is not on a regular Nitrate therapy. she does use Nitroglycerin as needed for chest pain.     Residual symptoms of CAD include dyspnea. Patient denies any current chest pain, fatigue, feeding intolerance, lower extremity edema, palpitations. Risk factors are controlled or at goal.  Primary risk factors include elevated cholesterol, hypertension and known history of coronary artery disease.       Health Maintenance  [facility] called and spoke with spouse regarding their Home Health referral.  I communicated that we do have their referral and are currently waiting for an open time slot to get their admission visit scheduled.  I asked if they were okay waiting to be called back within the next few days or in the event that a spot opens up sooner, or if they wanted their referral sent to a different Home Health agency.  They voiced their understanding of the delay and asked that their referral stay with [facility] at this time.  I requested a later start of care date for 4/14 per their request.      Maintenance Due   Topic Date Due    COVID-19 Vaccine (1) Never done    DTaP/Tdap/Td series (1 - Tdap) Never done    Shingrix Vaccine Age 50> (1 of 2) Never done    Bone Densitometry (Dexa) Screening  Never done    Breast Cancer Screen Mammogram  05/20/2017    Colorectal Cancer Screening Combo  12/02/2020       Past Medical, Family, and Social History:     Current Outpatient Medications on File Prior to Visit   Medication Sig Dispense Refill    simvastatin (ZOCOR) 40 mg tablet Take 1 Tablet by mouth nightly. 90 Tablet 1    atenoloL (TENORMIN) 50 mg tablet Take 1 Tablet by mouth daily. 90 Tablet 1    furosemide (LASIX) 20 mg tablet Take 1 tablet daily as needed for swelling 60 Tablet 1    esomeprazole (NEXIUM) 40 mg capsule Take 1 Capsule by mouth daily. 90 Capsule 1    clopidogreL (PLAVIX) 75 mg tab TAKE ONE TABLET BY MOUTH ONCE A DAY 90 Tablet 1    albuterol (PROVENTIL HFA, VENTOLIN HFA, PROAIR HFA) 90 mcg/actuation inhaler Take 2 Puffs by inhalation every four (4) hours as needed for Wheezing. 1 Each 3    nitroglycerin (NITROSTAT) 0.4 mg SL tablet 1 Tablet by SubLINGual route as needed for Chest Pain. 25 Each 0    aspirin 81 mg tablet Take 81 mg by mouth.  [DISCONTINUED] fluticasone propionate (FLONASE) 50 mcg/actuation nasal spray USE 2 SPRAYS IN BOTH NOSTRILS DAILY 16 g 2    [DISCONTINUED] gabapentin (NEURONTIN) 300 mg capsule TAKE ONE CAPSULE BY MOUTH 3 TIMES A DAY AS NEEDED FOR PAIN  Indications: \"change of life\" signs 90 Capsule 1     No current facility-administered medications on file prior to visit.        Patient Active Problem List   Diagnosis Code    Anxiety F41.9    Hyperlipidemia E78.5    CAD (coronary artery disease) I25.10    Blindness H54.7    HTN (hypertension) I10    Osteopenia M85.80    B12 deficiency E53.8    Insomnia G47.00    Allergic rhinitis J30.9    Gastroesophageal reflux disease without esophagitis K21.9    Moderate episode of recurrent major depressive disorder ([facility] F33.1    Controlled substance agreement signed Z79.899    Chronic pain syndrome G89.4       Social History     Socioeconomic History    Marital status:    Tobacco Use    Smoking status: Never Smoker    Smokeless tobacco: Never Used   Substance and Sexual Activity    Alcohol use: Yes     Comment: social    Drug use: No    Sexual activity: Yes     Partners: Male     Birth control/protection: None        Review of Systems   Review of Systems   Constitutional: Negative for chills and fever. Cardiovascular: Negative for chest pain, palpitations and orthopnea. Gastrointestinal: Negative for heartburn, nausea and vomiting. Skin: Positive for rash. Objective:     Visit Vitals  /70 (BP 1 Location: Right arm, BP Patient Position: Sitting, BP Cuff Size: Adult)   Pulse 61   Temp 97.8 °F (36.6 °C) (Oral)   Resp 18   Ht 5' 1\" (1.549 m)   Wt 165 lb 12.8 oz (75.2 kg)   SpO2 96%   BMI 31.33 kg/m²        Physical Exam  Vitals and nursing note reviewed. Constitutional:       Appearance: Normal appearance. HENT:      Head: Normocephalic and atraumatic. Cardiovascular:      Rate and Rhythm: Normal rate and regular rhythm. Pulses: Normal pulses. Heart sounds: Normal heart sounds. No murmur heard. No friction rub. No gallop. Pulmonary:      Effort: Pulmonary effort is normal.      Breath sounds: Normal breath sounds. Abdominal:      General: Abdomen is flat. Bowel sounds are normal.      Palpations: Abdomen is soft. Musculoskeletal:         General: Normal range of motion. Cervical back: Normal range of motion and neck supple. Skin:     Comments: Mild rash on the throat and chin. Small erythematous lesions, no drainage or bleeding. Neurological:      Mental Status: She is alert. Pertinent Labs/Studies:      Assessment and orders:       ICD-10-CM ICD-9-CM    1. Chronic pain syndrome  G89.4 338.4 gabapentin (NEURONTIN) 300 mg capsule   2.  Facial rash R21 782.1 triamcinolone acetonide (KENALOG) 0.1 % topical cream   3. Right knee pain, unspecified chronicity  M25.561 719.46 gabapentin (NEURONTIN) 300 mg capsule   4. Seasonal allergic rhinitis, unspecified trigger  J30.2 477.9 fluticasone propionate (FLONASE) 50 mcg/actuation nasal spray   5. Coronary artery disease due to lipid rich plaque  I25.10 414.00     I25.83 414.3    6. Hyperlipidemia, unspecified hyperlipidemia type  E78.5 272.4    7. Moderate episode of recurrent major depressive disorder ([facility])  F33.1 296.32    8. Encounter for immunization  Z23 V03.89 PNEUMOCOCCAL, PCV20, PREVNAR 20, (AGE 18 YRS+), IM, PF      MD IMMUNIZ ADMIN,1 SINGLE/COMB VAC/TOXOID     Diagnoses and all orders for this visit:    1. Chronic pain syndrome  -     gabapentin (NEURONTIN) 300 mg capsule; TAKE ONE CAPSULE BY MOUTH 3 TIMES A DAY AS NEEDED FOR PAIN  Indications: \"change of life\" signs    2. Facial rash  -     triamcinolone acetonide (KENALOG) 0.1 % topical cream; Apply  to affected area two (2) times a day. use thin layer to face and neck    3. Right knee pain, unspecified chronicity  -     gabapentin (NEURONTIN) 300 mg capsule; TAKE ONE CAPSULE BY MOUTH 3 TIMES A DAY AS NEEDED FOR PAIN  Indications: \"change of life\" signs    4. Seasonal allergic rhinitis, unspecified trigger  -     fluticasone propionate (FLONASE) 50 mcg/actuation nasal spray; USE 2 SPRAYS IN BOTH NOSTRILS DAILY    5. Coronary artery disease due to lipid rich plaque    6. Hyperlipidemia, unspecified hyperlipidemia type    7. Moderate episode of recurrent major depressive disorder ([facility]    8. Encounter for immunization  -     PNEUMOCOCCAL, PCV20, PREVNAR 20, (AGE 18 YRS+), IM, PF  -     MD IMMUNIZ ADMIN,1 SINGLE/COMB VAC/TOXOID      Follow-up and Dispositions    · Return in about 3 months (around 10/14/2022) for Chronic Pain Follow up. I have discussed the diagnosis with the patient and the intended plan as seen in the above orders.   Social history, medical history, and labs were reviewed. The patient has received an after-visit summary and questions were answered concerning future plans. I have discussed medication side effects and warnings with the patient as well.     MD LINA Brewer & AVELINA REDMAN [facility]  07/14/22

## 2025-04-14 ENCOUNTER — TELEPHONE (OUTPATIENT)
Facility: CLINIC | Age: 75
End: 2025-04-14

## 2025-04-14 ENCOUNTER — OFFICE VISIT (OUTPATIENT)
Facility: CLINIC | Age: 75
End: 2025-04-14
Payer: MEDICARE

## 2025-04-14 VITALS
HEART RATE: 63 BPM | SYSTOLIC BLOOD PRESSURE: 153 MMHG | RESPIRATION RATE: 18 BRPM | OXYGEN SATURATION: 96 % | HEIGHT: 61 IN | DIASTOLIC BLOOD PRESSURE: 70 MMHG | WEIGHT: 184.6 LBS | BODY MASS INDEX: 34.85 KG/M2 | TEMPERATURE: 96 F

## 2025-04-14 DIAGNOSIS — K21.9 GASTROESOPHAGEAL REFLUX DISEASE WITHOUT ESOPHAGITIS: ICD-10-CM

## 2025-04-14 DIAGNOSIS — I10 PRIMARY HYPERTENSION: ICD-10-CM

## 2025-04-14 DIAGNOSIS — E53.8 B12 DEFICIENCY: ICD-10-CM

## 2025-04-14 DIAGNOSIS — F33.1 MODERATE EPISODE OF RECURRENT MAJOR DEPRESSIVE DISORDER (HCC): ICD-10-CM

## 2025-04-14 DIAGNOSIS — J40 BRONCHITIS: Primary | ICD-10-CM

## 2025-04-14 DIAGNOSIS — F51.04 PSYCHOPHYSIOLOGICAL INSOMNIA: ICD-10-CM

## 2025-04-14 DIAGNOSIS — H54.3 BLINDNESS OF BOTH EYES: ICD-10-CM

## 2025-04-14 DIAGNOSIS — I25.10 CORONARY ARTERY DISEASE INVOLVING NATIVE HEART WITHOUT ANGINA PECTORIS, UNSPECIFIED VESSEL OR LESION TYPE: Primary | ICD-10-CM

## 2025-04-14 DIAGNOSIS — J40 BRONCHITIS: ICD-10-CM

## 2025-04-14 DIAGNOSIS — J30.1 SEASONAL ALLERGIC RHINITIS DUE TO POLLEN: ICD-10-CM

## 2025-04-14 DIAGNOSIS — Z79.899 CONTROLLED SUBSTANCE AGREEMENT SIGNED: ICD-10-CM

## 2025-04-14 DIAGNOSIS — E78.2 MIXED HYPERLIPIDEMIA: ICD-10-CM

## 2025-04-14 PROBLEM — F13.20 SEDATIVE, HYPNOTIC OR ANXIOLYTIC DEPENDENCE, UNCOMPLICATED (HCC): Status: RESOLVED | Noted: 2023-06-08 | Resolved: 2025-04-14

## 2025-04-14 PROCEDURE — G8427 DOCREV CUR MEDS BY ELIG CLIN: HCPCS | Performed by: FAMILY MEDICINE

## 2025-04-14 PROCEDURE — 1090F PRES/ABSN URINE INCON ASSESS: CPT | Performed by: FAMILY MEDICINE

## 2025-04-14 PROCEDURE — G2211 COMPLEX E/M VISIT ADD ON: HCPCS | Performed by: FAMILY MEDICINE

## 2025-04-14 PROCEDURE — 1123F ACP DISCUSS/DSCN MKR DOCD: CPT | Performed by: FAMILY MEDICINE

## 2025-04-14 PROCEDURE — 3017F COLORECTAL CA SCREEN DOC REV: CPT | Performed by: FAMILY MEDICINE

## 2025-04-14 PROCEDURE — G8417 CALC BMI ABV UP PARAM F/U: HCPCS | Performed by: FAMILY MEDICINE

## 2025-04-14 PROCEDURE — 3078F DIAST BP <80 MM HG: CPT | Performed by: FAMILY MEDICINE

## 2025-04-14 PROCEDURE — 3077F SYST BP >= 140 MM HG: CPT | Performed by: FAMILY MEDICINE

## 2025-04-14 PROCEDURE — 1160F RVW MEDS BY RX/DR IN RCRD: CPT | Performed by: FAMILY MEDICINE

## 2025-04-14 PROCEDURE — G8400 PT W/DXA NO RESULTS DOC: HCPCS | Performed by: FAMILY MEDICINE

## 2025-04-14 PROCEDURE — 99214 OFFICE O/P EST MOD 30 MIN: CPT | Performed by: FAMILY MEDICINE

## 2025-04-14 PROCEDURE — 1036F TOBACCO NON-USER: CPT | Performed by: FAMILY MEDICINE

## 2025-04-14 PROCEDURE — 1159F MED LIST DOCD IN RCRD: CPT | Performed by: FAMILY MEDICINE

## 2025-04-14 RX ORDER — PREDNISONE 10 MG/1
10 TABLET ORAL DAILY
Qty: 5 TABLET | Refills: 0 | Status: SHIPPED | OUTPATIENT
Start: 2025-04-14 | End: 2025-04-19

## 2025-04-14 RX ORDER — PREDNISONE 20 MG/1
40 TABLET ORAL DAILY
Qty: 10 TABLET | Refills: 0 | Status: SHIPPED | OUTPATIENT
Start: 2025-04-14 | End: 2025-04-14

## 2025-04-14 RX ORDER — FLUCONAZOLE 150 MG/1
150 TABLET ORAL
Qty: 2 TABLET | Refills: 0 | Status: SHIPPED | OUTPATIENT
Start: 2025-04-14 | End: 2025-04-20

## 2025-04-14 RX ORDER — TRAZODONE HYDROCHLORIDE 50 MG/1
50 TABLET ORAL NIGHTLY
Qty: 90 TABLET | Refills: 1 | Status: SHIPPED | OUTPATIENT
Start: 2025-04-14

## 2025-04-14 SDOH — ECONOMIC STABILITY: FOOD INSECURITY: WITHIN THE PAST 12 MONTHS, YOU WORRIED THAT YOUR FOOD WOULD RUN OUT BEFORE YOU GOT MONEY TO BUY MORE.: NEVER TRUE

## 2025-04-14 SDOH — ECONOMIC STABILITY: FOOD INSECURITY: WITHIN THE PAST 12 MONTHS, THE FOOD YOU BOUGHT JUST DIDN'T LAST AND YOU DIDN'T HAVE MONEY TO GET MORE.: NEVER TRUE

## 2025-04-14 ASSESSMENT — ENCOUNTER SYMPTOMS
APNEA: 0
ABDOMINAL DISTENTION: 0
COUGH: 1
CHEST TIGHTNESS: 0
ABDOMINAL PAIN: 0

## 2025-04-14 ASSESSMENT — PATIENT HEALTH QUESTIONNAIRE - PHQ9
SUM OF ALL RESPONSES TO PHQ QUESTIONS 1-9: 2
9. THOUGHTS THAT YOU WOULD BE BETTER OFF DEAD, OR OF HURTING YOURSELF: NOT AT ALL
2. FEELING DOWN, DEPRESSED OR HOPELESS: NOT AT ALL
8. MOVING OR SPEAKING SO SLOWLY THAT OTHER PEOPLE COULD HAVE NOTICED. OR THE OPPOSITE, BEING SO FIGETY OR RESTLESS THAT YOU HAVE BEEN MOVING AROUND A LOT MORE THAN USUAL: NOT AT ALL
SUM OF ALL RESPONSES TO PHQ QUESTIONS 1-9: 2
6. FEELING BAD ABOUT YOURSELF - OR THAT YOU ARE A FAILURE OR HAVE LET YOURSELF OR YOUR FAMILY DOWN: NOT AT ALL
SUM OF ALL RESPONSES TO PHQ QUESTIONS 1-9: 2
1. LITTLE INTEREST OR PLEASURE IN DOING THINGS: NOT AT ALL
5. POOR APPETITE OR OVEREATING: NOT AT ALL
10. IF YOU CHECKED OFF ANY PROBLEMS, HOW DIFFICULT HAVE THESE PROBLEMS MADE IT FOR YOU TO DO YOUR WORK, TAKE CARE OF THINGS AT HOME, OR GET ALONG WITH OTHER PEOPLE: NOT DIFFICULT AT ALL
3. TROUBLE FALLING OR STAYING ASLEEP: SEVERAL DAYS
SUM OF ALL RESPONSES TO PHQ QUESTIONS 1-9: 2
7. TROUBLE CONCENTRATING ON THINGS, SUCH AS READING THE NEWSPAPER OR WATCHING TELEVISION: NOT AT ALL
4. FEELING TIRED OR HAVING LITTLE ENERGY: SEVERAL DAYS

## 2025-04-14 NOTE — PROGRESS NOTES
\"Have you been to the ER, urgent care clinic since your last visit?  Hospitalized since your last visit?\"    NO    “Have you seen or consulted any other health care providers outside of [facility] since your last visit?”    NO    Have you had a mammogram?”   NO    No breast cancer screening on file         “Have you had a colorectal cancer screening such as a colonoscopy/FIT/Cologuard?    NO    No colonoscopy on file  No cologuard on file  No FIT/FOBT on file   No flexible sigmoidoscopy on file         Click Here for Release of Records Request

## 2025-04-14 NOTE — PROGRESS NOTES
[facility]    History of Present Illness:   Kristal Alanis is a 74 y.o. female with history of CAD, HTN, GERD, Anxiety, HLD, Blindness, insomnia, Depression   CC: Follow up  History provided by patient and Records    HPI:    Sinus Pressure: Patient noting has having issues with sinus congestion and feeling of ears \"stopped up\".  Also noting sensation of chest congestion as well.  Patient noting having 2 weeks of chest congestion with wheezing, and increased mucous as well.  Has switched to Claritin recently.    Blindness: Has issues with Yordan Mel syndrome which has caused problems ith sleep as well.    Pain Assessment inventory:  Onset of Symptoms: Many years ago  Description: Pain Generally related to body aches and move around.  Patient got off of Gabapentin successfully.  Patient is doing well.  Noting in particular right knee pain is a problem.     Frequency: Daily  Pain Scale:(1-10): Mild to Moderate  Hx of similar symptoms: Yes  Radiation: Radiation to the legs  Duration:  continuous    Coronary Artery Disease Follow up:  Today patient reports he is feeling well and overall his symptoms are controlled on his current medications.  she  has not had a history of acute myocardial infarction in the past.  Prior management has included:   - Coronary stenting: No  - Coronary bypass: Yes     she has not had CHF      she is  on a statin ( Zocor (simvastatin) ).  she is  on antiplatelet therapy.  she is not on a beta blocker.  she is not on a regular Nitrate therapy.  she is  use Nitroglycerin as needed for chest pain.     Residual symptoms of CAD include fatigue.  Patient denies any current chest pain and exertional chest pressure/discomfort.  Risk factors are controlled or at goal.  Primary risk factors include elevated cholesterol, hypertension, and known history of coronary artery disease.     Hypertriglyceridemia Follow up:   Cardiovascular risks for her are: existing

## 2025-04-14 NOTE — TELEPHONE ENCOUNTER
Pt states pcp prescribed her Augmentin. States she will like pcp to prescribe a med for yeast infection to take. Please send to [facility]. Please advise.

## 2025-04-15 ENCOUNTER — RESULTS FOLLOW-UP (OUTPATIENT)
Facility: CLINIC | Age: 75
End: 2025-04-15

## 2025-04-15 LAB
ALBUMIN SERPL-MCNC: 3.8 G/DL (ref 3.5–5)
ALBUMIN/GLOB SERPL: 1.1 (ref 1.1–2.2)
ALP SERPL-CCNC: 78 U/L (ref 45–117)
ALT SERPL-CCNC: 17 U/L (ref 12–78)
ANION GAP SERPL CALC-SCNC: 5 MMOL/L (ref 2–12)
AST SERPL-CCNC: 16 U/L (ref 15–37)
BILIRUB SERPL-MCNC: 0.2 MG/DL (ref 0.2–1)
BUN SERPL-MCNC: 14 MG/DL (ref 6–20)
BUN/CREAT SERPL: 16 (ref 12–20)
CALCIUM SERPL-MCNC: 9.4 MG/DL (ref 8.5–10.1)
CHLORIDE SERPL-SCNC: 108 MMOL/L (ref 97–108)
CHOLEST SERPL-MCNC: 169 MG/DL
CO2 SERPL-SCNC: 27 MMOL/L (ref 21–32)
CREAT SERPL-MCNC: 0.87 MG/DL (ref 0.55–1.02)
ERYTHROCYTE [DISTWIDTH] IN BLOOD BY AUTOMATED COUNT: 12.3 % (ref 11.5–14.5)
GLOBULIN SER CALC-MCNC: 3.4 G/DL (ref 2–4)
GLUCOSE SERPL-MCNC: 107 MG/DL (ref 65–100)
HCT VFR BLD AUTO: 40.3 % (ref 35–47)
HDLC SERPL-MCNC: 57 MG/DL
HDLC SERPL: 3 (ref 0–5)
HGB BLD-MCNC: 13 G/DL (ref 11.5–16)
LDLC SERPL CALC-MCNC: 85.8 MG/DL (ref 0–100)
MCH RBC QN AUTO: 33.1 PG (ref 26–34)
MCHC RBC AUTO-ENTMCNC: 32.3 G/DL (ref 30–36.5)
MCV RBC AUTO: 102.5 FL (ref 80–99)
NRBC # BLD: 0 K/UL (ref 0–0.01)
NRBC BLD-RTO: 0 PER 100 WBC
PLATELET # BLD AUTO: 186 K/UL (ref 150–400)
PMV BLD AUTO: 11.2 FL (ref 8.9–12.9)
POTASSIUM SERPL-SCNC: 4.6 MMOL/L (ref 3.5–5.1)
PROT SERPL-MCNC: 7.2 G/DL (ref 6.4–8.2)
RBC # BLD AUTO: 3.93 M/UL (ref 3.8–5.2)
SODIUM SERPL-SCNC: 140 MMOL/L (ref 136–145)
TRIGL SERPL-MCNC: 131 MG/DL
VIT B12 SERPL-MCNC: 418 PG/ML (ref 193–986)
VLDLC SERPL CALC-MCNC: 26.2 MG/DL
WBC # BLD AUTO: 7.2 K/UL (ref 3.6–11)

## 2025-04-25 ENCOUNTER — TELEPHONE (OUTPATIENT)
Facility: CLINIC | Age: 75
End: 2025-04-25

## 2025-04-25 DIAGNOSIS — H54.3 BLINDNESS OF BOTH EYES: Primary | ICD-10-CM

## 2025-04-25 DIAGNOSIS — I25.10 CORONARY ARTERY DISEASE INVOLVING NATIVE HEART WITHOUT ANGINA PECTORIS, UNSPECIFIED VESSEL OR LESION TYPE: ICD-10-CM

## 2025-04-25 NOTE — TELEPHONE ENCOUNTER
Will see about getting a Personal Care/Home health care aid.    Jonn Lopez MD  [facility]  04/25/25

## 2025-04-25 NOTE — TELEPHONE ENCOUNTER
Pt called requesting a referral for a pca, pt states her insurance company told her they will pay for it.     Pt states she needs help with the following:    -Checking the mail  -Assisting with grocery shopping  -Some Housekeeping, including laundry    Please advise...

## 2025-05-01 RX ORDER — FLUTICASONE PROPIONATE 50 MCG
SPRAY, SUSPENSION (ML) NASAL
Qty: 16 G | Refills: 2 | Status: SHIPPED | OUTPATIENT
Start: 2025-05-01

## 2025-05-05 DIAGNOSIS — J01.90 ACUTE BACTERIAL SINUSITIS: ICD-10-CM

## 2025-05-05 DIAGNOSIS — B96.89 ACUTE BACTERIAL SINUSITIS: ICD-10-CM

## 2025-05-05 RX ORDER — GUAIFENESIN 600 MG/1
TABLET, EXTENDED RELEASE ORAL
Qty: 60 TABLET | Refills: 1 | Status: SHIPPED | OUTPATIENT
Start: 2025-05-05

## 2025-05-23 DIAGNOSIS — E78.2 MIXED HYPERLIPIDEMIA: ICD-10-CM

## 2025-05-23 DIAGNOSIS — I25.10 ATHEROSCLEROTIC HEART DISEASE OF NATIVE CORONARY ARTERY WITHOUT ANGINA PECTORIS: ICD-10-CM

## 2025-05-23 RX ORDER — SIMVASTATIN 40 MG
TABLET ORAL
Qty: 90 TABLET | Refills: 1 | Status: SHIPPED | OUTPATIENT
Start: 2025-05-23

## 2025-05-23 RX ORDER — ATENOLOL 50 MG/1
50 TABLET ORAL DAILY
Qty: 90 TABLET | Refills: 1 | Status: SHIPPED | OUTPATIENT
Start: 2025-05-23

## 2025-05-23 NOTE — PROGRESS NOTES
Pt remains in her own clothing at this time. Hospital gown not placed on.    [facility]    History of Present Illness:   Meir Rausch is a 72 y.o. female with history of CAD, HTN, GERD, Anxiety, HLD, Blindness, insomnia, Depression  CC: Follow up  History provided by patient and Records    HPI:  Bronchitis: patient noting recurrence of the cough and wheezing. Had improved with Doxycycline and Flovent, ran out of Flovent. Acting up again over the last 2 weeks with weather changes. GERD: Controlled on Nexium    Pain Assessment inventory: Onset of Symptoms: Many years ago  Description: Pain Generally related to body aches and move around. Taking 300 mg nightly at this time, attempted to wean, unable to. Patient is doing well. Frequency: Daily  Pain Scale:(1-10): Mild to Moderate  Hx of similar symptoms: Yes  Radiation: Radiation to the legs  Duration:  continuous    Coronary Artery Disease Follow up: Today patient reports he is feeling well and overall his symptoms are controlled on his current medications. she  has not had a history of acute myocardial infarction in the past.  Prior management has included:   - Coronary stenting: No  - Coronary bypass: Yes    she has not had CHF     she is  on a statin ( Zocor (simvastatin) ). she is  on antiplatelet therapy. she is not on a beta blocker. she is not on a regular Nitrate therapy. she is  use Nitroglycerin as needed for chest pain. Residual symptoms of CAD include fatigue. Patient denies any current chest pain and exertional chest pressure/discomfort. Risk factors are controlled or at goal.  Primary risk factors include elevated cholesterol, hypertension, and known history of coronary artery disease.      Health Maintenance  Health Maintenance Due   Topic Date Due    DTaP/Tdap/Td vaccine (1 - Tdap) Never done    Colorectal Cancer Screen  Never done    Breast cancer screen  Never done    DEXA (modify frequency per FRAX score)  Never done    Shingles vaccine (2 of 3) 10/24/2017    COVID-19 Vaccine

## 2025-06-16 ENCOUNTER — TELEPHONE (OUTPATIENT)
Facility: CLINIC | Age: 75
End: 2025-06-16

## 2025-06-16 NOTE — TELEPHONE ENCOUNTER
Pt states she was coughing and and she coughed up some mucus into her hand.  Pt states she felt something move, and she felt it with her other hand and thinks it maybe a round worm she may have coughed up. Pt states she has been coughing up mucus for a few months, and would like to have some testing done. Pt has taken a stool sample and would like to have Dr MARTINEZ send it off for testing. Pt states to leave a msg if she does not answer and she will have someone bring in the sample. Please advise.

## 2025-06-16 NOTE — TELEPHONE ENCOUNTER
Pt called back looking for an update. Read pcp message about wanting to see pt in office and about the sample. Pt states she will need to know when she can get an appt (pcp don't have openings until next Thursday). Pt also stated her stool sample is in a clean tupperware container. Pt states she will need to know if pcp will accept this, so someone can bring it in before lab close. Please advise.

## 2025-06-17 NOTE — TELEPHONE ENCOUNTER
Attempted to call patient to schedule appointment and give message from  no answer.If she calls back please inform per  Cannot take the stool sample, that is not a sterile container unfortunately.  Also we can she her next Tuesday at 11:40.

## 2025-07-15 DIAGNOSIS — R21 SKIN RASH: Primary | ICD-10-CM

## 2025-07-15 RX ORDER — TRIAMCINOLONE ACETONIDE 1 MG/G
CREAM TOPICAL
Qty: 45 G | Refills: 1 | Status: SHIPPED | OUTPATIENT
Start: 2025-07-15

## 2025-07-17 NOTE — PROGRESS NOTES
1. Have you been to the ER, urgent care clinic since your last visit? Hospitalized since your last visit? No    2. Have you seen or consulted any other health care providers outside of the [facility] since your last visit? Include any pap smears or colon screening.  No  Reviewed record in preparation for visit and have necessary documentation  Pt did not bring medication to office visit for review  opportunity was given for questions  Goals that were addressed and/or need to be completed during or after this appointment include    Health Maintenance Due   Topic Date Due    DTaP/Tdap/Td series (1 - Tdap) 04[birth year]/1973    GLAUCOMA SCREENING Q2Y  04/22/2017    OSTEOPOROSIS SCREENING (DEXA)  04/22/2017    Pneumococcal 65+ Low/Medium Risk (1 of 2 - PCV13) 10/18/2017    BREAST CANCER SCRN MAMMOGRAM  05/20/2018 Called pt, relayed Vashti Marie NP recommendations as below:    - Advised to apply a warm compress to the affected area every morning and evening, followed by gentle scrubbing with baby shampoo to remove bacteria and open skin follicles.  - Erythromycin antibiotic ointment prescribed for application on the eyelid at night. Referral to optometry made for further evaluation in 7 to 10 days. If symptoms persist, consult an eye doctor to rule out an inflammatory rash such as psoriasis.    Pt verbalizes understanding and denies any further questions at this time.

## 2025-07-24 DIAGNOSIS — K21.9 GASTROESOPHAGEAL REFLUX DISEASE WITHOUT ESOPHAGITIS: ICD-10-CM

## 2025-07-24 RX ORDER — ESOMEPRAZOLE MAGNESIUM 40 MG/1
CAPSULE, DELAYED RELEASE ORAL
Qty: 90 CAPSULE | Refills: 1 | Status: SHIPPED | OUTPATIENT
Start: 2025-07-24

## 2025-08-08 DIAGNOSIS — I10 PRIMARY HYPERTENSION: ICD-10-CM

## 2025-08-08 RX ORDER — FUROSEMIDE 20 MG/1
TABLET ORAL
Qty: 90 TABLET | Refills: 1 | Status: SHIPPED | OUTPATIENT
Start: 2025-08-08